# Patient Record
Sex: FEMALE | Race: WHITE | Employment: UNEMPLOYED
[De-identification: names, ages, dates, MRNs, and addresses within clinical notes are randomized per-mention and may not be internally consistent; named-entity substitution may affect disease eponyms.]

---

## 2017-01-24 ENCOUNTER — TELEPHONE (OUTPATIENT)
Dept: INTERNAL MEDICINE | Facility: CLINIC | Age: 52
End: 2017-01-24

## 2017-02-22 ENCOUNTER — TELEPHONE (OUTPATIENT)
Dept: INTERNAL MEDICINE | Facility: CLINIC | Age: 52
End: 2017-02-22

## 2017-02-22 DIAGNOSIS — I10 ESSENTIAL HYPERTENSION: Primary | ICD-10-CM

## 2017-02-22 DIAGNOSIS — E11.22 TYPE 2 DIABETES MELLITUS WITH CHRONIC KIDNEY DISEASE, WITHOUT LONG-TERM CURRENT USE OF INSULIN, UNSPECIFIED CKD STAGE (HCC): ICD-10-CM

## 2017-06-06 ENCOUNTER — HOSPITAL ENCOUNTER (OUTPATIENT)
Age: 52
Discharge: HOME | End: 2017-06-06
Payer: COMMERCIAL

## 2017-06-06 LAB
BUN BLDV-MCNC: 16 MG/DL (ref 6–20)
CREAT SERPL-MCNC: 0.69 MG/DL (ref 0.5–0.9)
GFR AFRICAN AMERICAN: >60 ML/MIN
GFR NON-AFRICAN AMERICAN: >60 ML/MIN
GFR SERPL CREATININE-BSD FRML MDRD: NORMAL ML/MIN/{1.73_M2}
GFR SERPL CREATININE-BSD FRML MDRD: NORMAL ML/MIN/{1.73_M2}

## 2017-06-06 PROCEDURE — 82565 ASSAY OF CREATININE: CPT

## 2017-06-06 PROCEDURE — 84520 ASSAY OF UREA NITROGEN: CPT

## 2017-06-06 PROCEDURE — 36415 COLL VENOUS BLD VENIPUNCTURE: CPT

## 2017-07-21 DIAGNOSIS — I10 ESSENTIAL HYPERTENSION: ICD-10-CM

## 2017-07-24 RX ORDER — METOPROLOL SUCCINATE 50 MG/1
50 TABLET, EXTENDED RELEASE ORAL DAILY
Qty: 30 TABLET | Refills: 6 | Status: SHIPPED | OUTPATIENT
Start: 2017-07-24 | End: 2018-04-08 | Stop reason: SDUPTHER

## 2017-07-24 RX ORDER — GLIMEPIRIDE 2 MG/1
2 TABLET ORAL EVERY MORNING
Qty: 30 TABLET | Refills: 6 | Status: SHIPPED | OUTPATIENT
Start: 2017-07-24 | End: 2017-12-14 | Stop reason: SDUPTHER

## 2017-12-14 ENCOUNTER — OFFICE VISIT (OUTPATIENT)
Dept: INTERNAL MEDICINE | Age: 52
End: 2017-12-14
Payer: COMMERCIAL

## 2017-12-14 VITALS
HEIGHT: 67 IN | DIASTOLIC BLOOD PRESSURE: 75 MMHG | SYSTOLIC BLOOD PRESSURE: 172 MMHG | OXYGEN SATURATION: 98 % | WEIGHT: 199 LBS | BODY MASS INDEX: 31.23 KG/M2 | HEART RATE: 77 BPM

## 2017-12-14 DIAGNOSIS — I73.9 CLAUDICATION OF LOWER EXTREMITY (HCC): ICD-10-CM

## 2017-12-14 DIAGNOSIS — Z13.220 SCREENING FOR HYPERLIPIDEMIA: ICD-10-CM

## 2017-12-14 DIAGNOSIS — Z23 NEED FOR PROPHYLACTIC VACCINATION AGAINST STREPTOCOCCUS PNEUMONIAE (PNEUMOCOCCUS) AND INFLUENZA: ICD-10-CM

## 2017-12-14 DIAGNOSIS — E11.22 TYPE 2 DIABETES MELLITUS WITH CHRONIC KIDNEY DISEASE, WITHOUT LONG-TERM CURRENT USE OF INSULIN, UNSPECIFIED CKD STAGE (HCC): Primary | ICD-10-CM

## 2017-12-14 DIAGNOSIS — Z12.11 SCREENING FOR COLON CANCER: ICD-10-CM

## 2017-12-14 DIAGNOSIS — K21.9 GASTROESOPHAGEAL REFLUX DISEASE WITHOUT ESOPHAGITIS: ICD-10-CM

## 2017-12-14 DIAGNOSIS — Z23 FLU VACCINE NEED: ICD-10-CM

## 2017-12-14 DIAGNOSIS — Z12.31 ENCOUNTER FOR SCREENING MAMMOGRAM FOR BREAST CANCER: ICD-10-CM

## 2017-12-14 DIAGNOSIS — I10 ESSENTIAL HYPERTENSION: ICD-10-CM

## 2017-12-14 LAB — HBA1C MFR BLD: 9.1 %

## 2017-12-14 PROCEDURE — 90472 IMMUNIZATION ADMIN EACH ADD: CPT | Performed by: INTERNAL MEDICINE

## 2017-12-14 PROCEDURE — 83036 HEMOGLOBIN GLYCOSYLATED A1C: CPT | Performed by: INTERNAL MEDICINE

## 2017-12-14 PROCEDURE — 90471 IMMUNIZATION ADMIN: CPT | Performed by: INTERNAL MEDICINE

## 2017-12-14 PROCEDURE — 90688 IIV4 VACCINE SPLT 0.5 ML IM: CPT | Performed by: INTERNAL MEDICINE

## 2017-12-14 PROCEDURE — 90670 PCV13 VACCINE IM: CPT | Performed by: INTERNAL MEDICINE

## 2017-12-14 RX ORDER — GLIMEPIRIDE 4 MG/1
4 TABLET ORAL EVERY MORNING
Qty: 30 TABLET | Refills: 0 | Status: SHIPPED | OUTPATIENT
Start: 2017-12-14 | End: 2018-01-19 | Stop reason: SDUPTHER

## 2017-12-14 RX ORDER — CLOPIDOGREL BISULFATE 75 MG/1
TABLET ORAL
Refills: 0 | COMMUNITY
Start: 2017-12-02 | End: 2020-05-08 | Stop reason: SDUPTHER

## 2017-12-14 RX ORDER — TRAMADOL HYDROCHLORIDE 50 MG/1
50 TABLET ORAL EVERY 8 HOURS PRN
Qty: 21 TABLET | Refills: 0 | Status: SHIPPED | OUTPATIENT
Start: 2017-12-14 | End: 2018-03-15 | Stop reason: SDUPTHER

## 2017-12-14 ASSESSMENT — ENCOUNTER SYMPTOMS
GASTROINTESTINAL NEGATIVE: 1
BACK PAIN: 1
RESPIRATORY NEGATIVE: 1
EYES NEGATIVE: 1
ALLERGIC/IMMUNOLOGIC NEGATIVE: 1
SINUS PRESSURE: 1

## 2017-12-14 ASSESSMENT — PATIENT HEALTH QUESTIONNAIRE - PHQ9
SUM OF ALL RESPONSES TO PHQ QUESTIONS 1-9: 0
1. LITTLE INTEREST OR PLEASURE IN DOING THINGS: 0
SUM OF ALL RESPONSES TO PHQ9 QUESTIONS 1 & 2: 0
2. FEELING DOWN, DEPRESSED OR HOPELESS: 0

## 2017-12-14 NOTE — PROGRESS NOTES
[facility]   Progress Note      Visit Information    Have you changed or started any medications since your last visit including any over-the-counter medicines, vitamins, or herbal medicines? no   Are you having any side effects from any of your medications? -  no  Have you stopped taking any of your medications? Is so, why? -  no    Have you seen any other physician or provider since your last visit? Yes  Have you had any other diagnostic tests since your last visit? Yes - Records Obtained  Have you been seen in the emergency room and/or had an admission to a hospital since we last saw you? Yes - Records Obtained, [facility]  Have you had your routine dental cleaning in the past 6 months? no    Have you activated your [employer] account? If not, what are your barriers?  No:      Patient Care Team:  Boris Jaffe MD as PCP - General (Internal Medicine)  Marc Summers MD as Consulting Physician (Internal Medicine Cardiovascular Disease)    Medical History Review  Past Medical, Family, and Social History reviewed and does contribute to the patient presenting condition    Health Maintenance   Topic Date Due    HIV screen  01/01/1981    Pneumococcal med risk (1 of 1 - PPSV23) 01/01/1985    Colon cancer screen colonoscopy  01/01/2016    Lipid screen  08/12/2016    Diabetic foot exam  12/02/2016    Diabetic retinal exam  12/02/2016    Diabetic hemoglobin A1C test  04/04/2017    Diabetic microalbuminuria test  08/24/2017    Flu vaccine (1) 09/01/2017    Breast cancer screen  12/02/2017    Cervical cancer screen  12/02/2018    DTaP/Tdap/Td vaccine (2 - Td) 08/13/2025         Date of patient's visit: 12/14/2017  Patient's Name:  Sharlene Espinosa                   YOB: 1965        PCP:  Boris Jaffe MD    Sharlene Espinosa is a 47 y.o. female who presents for   Chief Complaint   Patient presents with    Diabetes    and follow up of chronic (L) 08/12/2015        Has had Fem pop bypass for PAD and reports not much change to leg. She is not smoking anymore. She is on ASA, plavix, and statin therapy. Continues to have a combination of claudication and neuropathic pain,  Would like to see another pain practitioner    Take her BP meds at night but reports pain and BP is high today. Patient's allergies, medications, past medical, surgical, social and family histories were reviewed and updated as appropriate. ALLERGIES    No Known Allergies      MEDICATIONS:      Current Outpatient Prescriptions   Medication Sig Dispense Refill    metFORMIN (GLUCOPHAGE) 1000 MG tablet take 1 tablet by mouth twice a day with meals 60 tablet 6    glimepiride (AMARYL) 2 MG tablet Take 1 tablet by mouth every morning 30 tablet 6    metoprolol succinate (TOPROL XL) 50 MG extended release tablet Take 1 tablet by mouth daily 30 tablet 6    lisinopril (PRINIVIL) 20 MG tablet Take 1 tablet by mouth daily 30 tablet 3    atorvastatin (LIPITOR) 40 MG tablet Take 1 tablet by mouth daily 30 tablet 6    prasugrel (EFFIENT) 10 MG TABS Take 10 mg by mouth daily.  aspirin 81 MG tablet Take 81 mg by mouth daily. No current facility-administered medications for this visit.         Patient Care Team:  Luz Kong MD as PCP - General (Internal Medicine)  Tito Summers MD as Consulting Physician (Internal Medicine Cardiovascular Disease)    PAST MEDICAL AND SURGICAL HISTORY:      Past Medical History:   Diagnosis Date    CAD (coronary artery disease)     Diabetes mellitus ([facility] [address]     Hyperlipidemia     Hypertension     Type II or unspecified type diabetes mellitus without mention of complication, not stated as uncontrolled      Past Surgical History:   Procedure Laterality Date    CARDIAC SURGERY      Two Stents placed   [place] [id number] VASCULAR SURGERY      PVD       SOCIAL HISTORY      Social History   Substance Use Topics    Smoking status: LABORATORY FINDINGS:    CBC:   Lab Results   Component Value Date    WBC 7.6 08/24/2016    HGB 14.6 08/24/2016     08/24/2016     05/26/2012     BMP:    Lab Results   Component Value Date     08/24/2016    K 4.2 08/24/2016     08/24/2016    CO2 24 08/24/2016    BUN 16 06/06/2017    CREATININE 0.69 06/06/2017    GLUCOSE 204 08/24/2016    GLUCOSE 167 05/26/2012     Hemoglobin A1C:   Lab Results   Component Value Date    LABA1C 9.1 12/14/2017     Microalbumin Urine:   Lab Results   Component Value Date    MICROALBUR 208 08/24/2016     Lipid profile:   Lab Results   Component Value Date    CHOL 174 08/12/2015    TRIG 104 08/12/2015    HDL 30 08/12/2015     Thyroid functions:   Lab Results   Component Value Date    TSH 0.57 09/01/2011      Hepatic functions:   Lab Results   Component Value Date    ALT 9 08/12/2015    AST 6 08/12/2015    PROT 6.7 08/12/2015    BILITOT 0.33 08/12/2015    LABALBU 3.7 08/12/2015     Urine Analysis: No results found for: [address] [place]:      Health Maintenance Due   Topic Date Due    Colon cancer screen colonoscopy  01/01/2016    Lipid screen  08/12/2016    Diabetic retinal exam  12/02/2016    Diabetic microalbuminuria test  08/24/2017    Breast cancer screen  12/02/2017       ASSESSMENT AND PLAN:      1. Type 2 diabetes mellitus with chronic kidney disease, without long-term current use of insulin, unspecified CKD stage (HCC)   DIABETES FOOT EXAM- unchanged. We'll increase Amaryl from 2 mg to 4 mg daily and continue metformin 1000 mg twice a day     Microalbumin, Ur    POCT glycosylated hemoglobin (Hb A1C)    glimepiride (AMARYL) 4 MG tablet   2. Essential hypertension  , Uncontrolled today. Would continue metoprolol 50 mg daily and lisinopril 20 mg daily. Have patient see pain management and have pain controlled and then recheck blood pressure    3. Gastroesophageal reflux disease without esophagitis  , Stable on a PPI    4.  Encounter for

## 2018-01-19 DIAGNOSIS — E11.22 TYPE 2 DIABETES MELLITUS WITH CHRONIC KIDNEY DISEASE, WITHOUT LONG-TERM CURRENT USE OF INSULIN, UNSPECIFIED CKD STAGE (HCC): ICD-10-CM

## 2018-01-22 RX ORDER — GLIMEPIRIDE 4 MG/1
TABLET ORAL
Qty: 30 TABLET | Refills: 0 | Status: SHIPPED | OUTPATIENT
Start: 2018-01-22 | End: 2018-03-02 | Stop reason: SDUPTHER

## 2018-01-22 NOTE — TELEPHONE ENCOUNTER
Health Maintenance   Topic Date Due    Colon cancer screen colonoscopy  01/01/2016    Lipid screen  08/12/2016    Diabetic retinal exam  12/02/2016    Diabetic microalbuminuria test  08/24/2017    Breast cancer screen  12/02/2017    HIV screen  03/14/2018 (Originally 1/1/1981)    A1C test (Diabetic or Prediabetic)  03/14/2018    Cervical cancer screen  12/02/2018    Diabetic foot exam  12/14/2018    DTaP/Tdap/Td vaccine (2 - Td) 08/13/2025    Flu vaccine  Completed    Pneumococcal med risk  Completed             (applicable per patient's age: Cancer Screenings, Depression Screening, Fall Risk Screening, Immunizations)    Hemoglobin A1C (%)   Date Value   12/14/2017 9.1   04/04/2016 9.3   08/12/2015 10.5 (H)     Microalb/Crt.  Ratio (mcg/mg creat)   Date Value   08/24/2016 822 (H)     LDL Cholesterol (mg/dL)   Date Value   08/12/2015 123     AST (U/L)   Date Value   08/12/2015 6     ALT (U/L)   Date Value   08/12/2015 9     BUN (mg/dL)   Date Value   06/06/2017 16      (goal A1C is < 7)   (goal LDL is <100) need 30-50% reduction from baseline     BP Readings from Last 3 Encounters:   12/14/17 (!) 172/75   11/07/16 (!) 169/79   09/26/16 153/65    (goal /80)      All Future Testing planned in [facility]:  Lab Frequency Next Occurrence   Hemoglobin A1C Once 01/16/2018   Lipid Panel Once 01/16/2018   DENT[place] Digital Screen Bilateral [[id number]] Once 01/13/2018   POCT FECAL IMMUNOCHEMICAL TEST (FIT) Once 06/28/2018   Microalbumin, Ur Once 01/13/2018   Lipid, Fasting Once 01/13/2018       Next Visit Date:  Future Appointments  Date Time Provider Leonel Stephens   3/15/2018 11:10 AM Kings De Santiago MD [facility]            Patient Active Problem List:     DM type 2 (diabetes mellitus, type 2) ([facility] [address]     HTN (hypertension)     GERD (gastroesophageal reflux disease)     Tobacco abuse     Hyperlipidemia     Claudication of lower extremity ([facility] [address]     Diabetic peripheral vascular disease ([facility] [address]     Panic attack     Leg

## 2018-03-02 DIAGNOSIS — E11.22 TYPE 2 DIABETES MELLITUS WITH CHRONIC KIDNEY DISEASE, WITHOUT LONG-TERM CURRENT USE OF INSULIN, UNSPECIFIED CKD STAGE (HCC): ICD-10-CM

## 2018-03-06 RX ORDER — GLIMEPIRIDE 4 MG/1
TABLET ORAL
Qty: 30 TABLET | Refills: 3 | Status: SHIPPED | OUTPATIENT
Start: 2018-03-06 | End: 2018-08-01 | Stop reason: SDUPTHER

## 2018-03-15 ENCOUNTER — HOSPITAL ENCOUNTER (OUTPATIENT)
Age: 53
Setting detail: SPECIMEN
Discharge: HOME | End: 2018-03-15
Payer: COMMERCIAL

## 2018-03-15 ENCOUNTER — OFFICE VISIT (OUTPATIENT)
Dept: INTERNAL MEDICINE | Age: 53
End: 2018-03-15
Payer: COMMERCIAL

## 2018-03-15 VITALS
WEIGHT: 204 LBS | HEART RATE: 73 BPM | OXYGEN SATURATION: 98 % | DIASTOLIC BLOOD PRESSURE: 76 MMHG | BODY MASS INDEX: 31.95 KG/M2 | SYSTOLIC BLOOD PRESSURE: 178 MMHG

## 2018-03-15 DIAGNOSIS — Z13.220 SCREENING FOR HYPERLIPIDEMIA: ICD-10-CM

## 2018-03-15 DIAGNOSIS — I73.9 CLAUDICATION OF LOWER EXTREMITY (HCC): ICD-10-CM

## 2018-03-15 DIAGNOSIS — Z23 NEED FOR PROPHYLACTIC VACCINATION AND INOCULATION AGAINST VARICELLA: ICD-10-CM

## 2018-03-15 DIAGNOSIS — I10 ESSENTIAL HYPERTENSION: ICD-10-CM

## 2018-03-15 DIAGNOSIS — E11.42 DIABETIC POLYNEUROPATHY ASSOCIATED WITH TYPE 2 DIABETES MELLITUS (HCC): Primary | ICD-10-CM

## 2018-03-15 DIAGNOSIS — Z12.31 ENCOUNTER FOR SCREENING MAMMOGRAM FOR BREAST CANCER: ICD-10-CM

## 2018-03-15 DIAGNOSIS — E11.42 DIABETIC POLYNEUROPATHY ASSOCIATED WITH TYPE 2 DIABETES MELLITUS (HCC): ICD-10-CM

## 2018-03-15 LAB
CREATININE URINE: 75 MG/DL (ref 28–217)
HBA1C MFR BLD: 10.6 %
MICROALBUMIN/CREAT 24H UR: 653 MG/L
MICROALBUMIN/CREAT UR-RTO: 871 MCG/MG CREAT

## 2018-03-15 PROCEDURE — 83036 HEMOGLOBIN GLYCOSYLATED A1C: CPT | Performed by: INTERNAL MEDICINE

## 2018-03-15 PROCEDURE — 99214 OFFICE O/P EST MOD 30 MIN: CPT | Performed by: INTERNAL MEDICINE

## 2018-03-15 RX ORDER — TRAMADOL HYDROCHLORIDE 50 MG/1
50 TABLET ORAL DAILY PRN
Qty: 30 TABLET | Refills: 0 | Status: SHIPPED | OUTPATIENT
Start: 2018-03-15 | End: 2018-04-10 | Stop reason: SDUPTHER

## 2018-03-15 RX ORDER — AMLODIPINE BESYLATE 5 MG/1
5 TABLET ORAL DAILY
Qty: 30 TABLET | Refills: 3 | Status: SHIPPED | OUTPATIENT
Start: 2018-03-15 | End: 2018-11-05 | Stop reason: SDUPTHER

## 2018-03-15 RX ORDER — LISINOPRIL 20 MG/1
20 TABLET ORAL DAILY
Qty: 30 TABLET | Refills: 3 | Status: SHIPPED | OUTPATIENT
Start: 2018-03-15 | End: 2019-03-11 | Stop reason: ALTCHOICE

## 2018-03-15 ASSESSMENT — ENCOUNTER SYMPTOMS
SHORTNESS OF BREATH: 0
NAUSEA: 0
RHINORRHEA: 0
DIARRHEA: 0
SINUS PAIN: 0
CONSTIPATION: 0
COUGH: 0
BACK PAIN: 1

## 2018-04-08 DIAGNOSIS — I10 ESSENTIAL HYPERTENSION: ICD-10-CM

## 2018-04-09 RX ORDER — METOPROLOL SUCCINATE 50 MG/1
TABLET, EXTENDED RELEASE ORAL
Qty: 30 TABLET | Refills: 6 | Status: SHIPPED | OUTPATIENT
Start: 2018-04-09 | End: 2019-03-11 | Stop reason: SDUPTHER

## 2018-06-18 ENCOUNTER — HOSPITAL ENCOUNTER (OUTPATIENT)
Age: 53
Discharge: HOME | End: 2018-06-18
Payer: COMMERCIAL

## 2018-06-18 ENCOUNTER — OFFICE VISIT (OUTPATIENT)
Dept: INTERNAL MEDICINE | Age: 53
End: 2018-06-18
Payer: COMMERCIAL

## 2018-06-18 VITALS
HEART RATE: 80 BPM | BODY MASS INDEX: 32.15 KG/M2 | OXYGEN SATURATION: 100 % | RESPIRATION RATE: 12 BRPM | SYSTOLIC BLOOD PRESSURE: 138 MMHG | WEIGHT: 204.8 LBS | DIASTOLIC BLOOD PRESSURE: 78 MMHG | HEIGHT: 67 IN

## 2018-06-18 DIAGNOSIS — Z12.11 SCREENING FOR COLON CANCER: ICD-10-CM

## 2018-06-18 DIAGNOSIS — E11.22 TYPE 2 DIABETES MELLITUS WITH CHRONIC KIDNEY DISEASE, WITHOUT LONG-TERM CURRENT USE OF INSULIN, UNSPECIFIED CKD STAGE (HCC): Primary | ICD-10-CM

## 2018-06-18 DIAGNOSIS — Z23 NEED FOR PROPHYLACTIC VACCINATION AND INOCULATION AGAINST VARICELLA: ICD-10-CM

## 2018-06-18 DIAGNOSIS — I73.9 CLAUDICATION OF LOWER EXTREMITY (HCC): ICD-10-CM

## 2018-06-18 DIAGNOSIS — Z12.31 ENCOUNTER FOR SCREENING MAMMOGRAM FOR BREAST CANCER: ICD-10-CM

## 2018-06-18 DIAGNOSIS — Z13.220 SCREENING FOR HYPERLIPIDEMIA: ICD-10-CM

## 2018-06-18 DIAGNOSIS — I10 ESSENTIAL HYPERTENSION: ICD-10-CM

## 2018-06-18 LAB
CHOLESTEROL, FASTING: 251 MG/DL
CHOLESTEROL/HDL RATIO: 7.8
HBA1C MFR BLD: 10.6 %
HDLC SERPL-MCNC: 32 MG/DL
LDL CHOLESTEROL DIRECT: 166 MG/DL
LDL CHOLESTEROL: ABNORMAL MG/DL (ref 0–130)
TRIGLYCERIDE, FASTING: 473 MG/DL
VLDLC SERPL CALC-MCNC: ABNORMAL MG/DL (ref 1–30)

## 2018-06-18 PROCEDURE — 99214 OFFICE O/P EST MOD 30 MIN: CPT | Performed by: INTERNAL MEDICINE

## 2018-06-18 PROCEDURE — 83036 HEMOGLOBIN GLYCOSYLATED A1C: CPT | Performed by: INTERNAL MEDICINE

## 2018-06-18 PROCEDURE — 83721 ASSAY OF BLOOD LIPOPROTEIN: CPT

## 2018-06-18 PROCEDURE — 80061 LIPID PANEL: CPT

## 2018-06-18 PROCEDURE — 36415 COLL VENOUS BLD VENIPUNCTURE: CPT

## 2018-06-18 RX ORDER — TRAMADOL HYDROCHLORIDE 50 MG/1
50 TABLET ORAL DAILY PRN
Qty: 30 TABLET | Refills: 0 | Status: SHIPPED | OUTPATIENT
Start: 2018-06-18 | End: 2018-07-18

## 2018-06-18 ASSESSMENT — ENCOUNTER SYMPTOMS
RESPIRATORY NEGATIVE: 1
ALLERGIC/IMMUNOLOGIC NEGATIVE: 1
GASTROINTESTINAL NEGATIVE: 1
EYES NEGATIVE: 1
BACK PAIN: 1

## 2018-07-13 DIAGNOSIS — I73.9 CLAUDICATION OF LOWER EXTREMITY (HCC): ICD-10-CM

## 2018-07-16 RX ORDER — TRAMADOL HYDROCHLORIDE 50 MG/1
50 TABLET ORAL DAILY PRN
Qty: 30 TABLET | Refills: 0 | OUTPATIENT
Start: 2018-07-16 | End: 2018-08-15

## 2018-07-23 ENCOUNTER — OFFICE VISIT (OUTPATIENT)
Dept: INTERNAL MEDICINE | Age: 53
End: 2018-07-23
Payer: COMMERCIAL

## 2018-07-23 VITALS
DIASTOLIC BLOOD PRESSURE: 75 MMHG | HEIGHT: 67 IN | OXYGEN SATURATION: 97 % | HEART RATE: 78 BPM | SYSTOLIC BLOOD PRESSURE: 176 MMHG | WEIGHT: 208 LBS | BODY MASS INDEX: 32.65 KG/M2

## 2018-07-23 DIAGNOSIS — M79.606 LOWER EXTREMITY PAIN, DIFFUSE, UNSPECIFIED LATERALITY: Primary | ICD-10-CM

## 2018-07-23 DIAGNOSIS — E11.22 TYPE 2 DIABETES MELLITUS WITH CHRONIC KIDNEY DISEASE, WITHOUT LONG-TERM CURRENT USE OF INSULIN, UNSPECIFIED CKD STAGE (HCC): ICD-10-CM

## 2018-07-23 PROCEDURE — 99212 OFFICE O/P EST SF 10 MIN: CPT | Performed by: INTERNAL MEDICINE

## 2018-07-23 RX ORDER — TRAMADOL HYDROCHLORIDE 50 MG/1
50 TABLET ORAL DAILY PRN
Qty: 30 TABLET | Refills: 0 | Status: SHIPPED | OUTPATIENT
Start: 2018-07-23 | End: 2018-09-07 | Stop reason: SDUPTHER

## 2018-07-23 RX ORDER — GABAPENTIN 300 MG/1
300 CAPSULE ORAL 3 TIMES DAILY
Qty: 90 CAPSULE | Refills: 3 | Status: SHIPPED | OUTPATIENT
Start: 2018-07-23 | End: 2019-08-07 | Stop reason: SDUPTHER

## 2018-07-23 RX ORDER — TRAMADOL HYDROCHLORIDE 50 MG/1
50 TABLET ORAL DAILY PRN
COMMUNITY
End: 2018-07-23 | Stop reason: SDUPTHER

## 2018-07-23 NOTE — PROGRESS NOTES
Controlled substances monitoring: [place]AR[place] report reviewed today- activity consistent with treatment plan.  Will add gabapentin to help with neuropathic pain

## 2018-08-01 DIAGNOSIS — E11.22 TYPE 2 DIABETES MELLITUS WITH CHRONIC KIDNEY DISEASE, WITHOUT LONG-TERM CURRENT USE OF INSULIN, UNSPECIFIED CKD STAGE (HCC): ICD-10-CM

## 2018-08-02 RX ORDER — GLIMEPIRIDE 4 MG/1
TABLET ORAL
Qty: 30 TABLET | Refills: 3 | Status: SHIPPED | OUTPATIENT
Start: 2018-08-02 | End: 2019-03-11 | Stop reason: ALTCHOICE

## 2018-08-02 NOTE — TELEPHONE ENCOUNTER
Health Maintenance   Topic Date Due    Shingles Vaccine (1 of 2 - 2 Dose Series) 01/01/2016    Colon cancer screen colonoscopy  01/01/2016    Breast cancer screen  12/02/2017    HIV screen  09/18/2018 (Originally 1/1/1981)    Diabetic retinal exam  06/15/2019 (Originally 12/2/2016)    Potassium monitoring  06/15/2019 (Originally 8/24/2017)    Creatinine monitoring  06/15/2019 (Originally 6/6/2018)    Flu vaccine (1) 09/01/2018    A1C test (Diabetic or Prediabetic)  09/18/2018    Cervical cancer screen  12/02/2018    Diabetic foot exam  12/14/2018    Diabetic microalbuminuria test  03/15/2019    Lipid screen  06/18/2019    DTaP/Tdap/Td vaccine (2 - Td) 08/13/2025    Pneumococcal med risk  Completed             (applicable per patient's age: Cancer Screenings, Depression Screening, Fall Risk Screening, Immunizations)    Hemoglobin A1C (%)   Date Value   06/18/2018 10.6   03/15/2018 10.6   12/14/2017 9.1     Microalb/Crt.  Ratio (mcg/mg creat)   Date Value   03/15/2018 871 (H)     LDL Cholesterol (mg/dL)   Date Value   06/18/2018          AST (U/L)   Date Value   08/12/2015 6     ALT (U/L)   Date Value   08/12/2015 9     BUN (mg/dL)   Date Value   06/06/2017 16      (goal A1C is < 7)   (goal LDL is <100) need 30-50% reduction from baseline     BP Readings from Last 3 Encounters:   07/23/18 (!) 176/75   06/18/18 138/78   03/15/18 (!) 178/76    (goal /80)      All Future Testing planned in [facility]:  Lab Frequency Next Occurrence   POCT FECAL IMMUNOCHEMICAL TEST (FIT) Once 11/14/2018   [place] Digital Screen Bilateral [[id number]] Once 07/18/2018       Next Visit Date:  Future Appointments  Date Time Provider Leonel Stephens   8/27/2018 1:40 PM Shawna Pearl MD [facility]            Patient Active Problem List:     DM type 2 (diabetes mellitus, type 2) ([facility] [address]     HTN (hypertension)     GERD (gastroesophageal reflux disease)     Hyperlipidemia     Claudication of lower extremity ([facility] [address]     Diabetic

## 2018-08-24 PROBLEM — G62.9 NEUROPATHY: Status: ACTIVE | Noted: 2018-01-17

## 2018-09-07 ENCOUNTER — TELEPHONE (OUTPATIENT)
Dept: INTERNAL MEDICINE | Age: 53
End: 2018-09-07

## 2018-09-07 DIAGNOSIS — M79.606 LOWER EXTREMITY PAIN, DIFFUSE, UNSPECIFIED LATERALITY: ICD-10-CM

## 2018-09-07 RX ORDER — TRAMADOL HYDROCHLORIDE 50 MG/1
50 TABLET ORAL DAILY PRN
Qty: 30 TABLET | Refills: 0 | Status: SHIPPED | OUTPATIENT
Start: 2018-09-07 | End: 2018-10-09 | Stop reason: SDUPTHER

## 2018-10-08 ENCOUNTER — TELEPHONE (OUTPATIENT)
Dept: INTERNAL MEDICINE | Age: 53
End: 2018-10-08

## 2018-10-08 DIAGNOSIS — M79.606 LOWER EXTREMITY PAIN, DIFFUSE, UNSPECIFIED LATERALITY: ICD-10-CM

## 2018-10-08 NOTE — TELEPHONE ENCOUNTER
Pt calling asking for tramadol refill , she cannot get in to see dr Anahi Perez  til november 5th there is no open appt / please advise       Next Visit Date:  Future Appointments  Date Time Provider Leonel Stephens   11/5/2018 11:30 AM Juventino Cool MD [facility]   Topic Date Due    HIV screen  01/01/1981    Shingles Vaccine (1 of 2 - 2 Dose Series) 01/01/2016    Colon cancer screen colonoscopy  01/01/2016    Breast cancer screen  12/02/2017    Flu vaccine (1) 09/01/2018    A1C test (Diabetic or Prediabetic)  09/18/2018    Diabetic retinal exam  06/15/2019 (Originally 12/2/2016)    Potassium monitoring  06/15/2019 (Originally 8/24/2017)    Creatinine monitoring  06/15/2019 (Originally 6/6/2018)    Cervical cancer screen  12/02/2018    Diabetic foot exam  12/14/2018    Diabetic microalbuminuria test  03/15/2019    Lipid screen  06/18/2019    DTaP/Tdap/Td vaccine (2 - Td) 08/13/2025    Pneumococcal med risk  Completed       Hemoglobin A1C (%)   Date Value   06/18/2018 10.6   03/15/2018 10.6   12/14/2017 9.1             ( goal A1C is < 7)   Microalb/Crt.  Ratio (mcg/mg creat)   Date Value   03/15/2018 871 (H)     LDL Cholesterol (mg/dL)   Date Value   06/18/2018 08/12/2015 123       (goal LDL is <100)   AST (U/L)   Date Value   08/12/2015 6     ALT (U/L)   Date Value   08/12/2015 9     BUN (mg/dL)   Date Value   06/06/2017 16     BP Readings from Last 3 Encounters:   07/23/18 (!) 176/75   06/18/18 138/78   03/15/18 (!) 178/76          (goal 120/80)    All Future Testing planned in [facility]  Lab Frequency Next Occurrence   POCT FECAL IMMUNOCHEMICAL TEST (FIT) Once 11/14/2018   DENTON Digital Screen Bilateral [[id number]] Once 07/18/2018               Patient Active Problem List:     DM type 2 (diabetes mellitus, type 2) ([address]     HTN (hypertension)     GERD (gastroesophageal reflux disease)     Hyperlipidemia     Claudication of lower extremity (HCC)     Diabetes mellitus

## 2018-10-09 RX ORDER — TRAMADOL HYDROCHLORIDE 50 MG/1
50 TABLET ORAL DAILY PRN
Qty: 30 TABLET | Refills: 0 | Status: SHIPPED | OUTPATIENT
Start: 2018-10-09 | End: 2018-11-05 | Stop reason: SDUPTHER

## 2018-11-05 ENCOUNTER — OFFICE VISIT (OUTPATIENT)
Dept: INTERNAL MEDICINE | Age: 53
End: 2018-11-05
Payer: COMMERCIAL

## 2018-11-05 VITALS
HEART RATE: 83 BPM | DIASTOLIC BLOOD PRESSURE: 84 MMHG | HEIGHT: 67 IN | WEIGHT: 205 LBS | SYSTOLIC BLOOD PRESSURE: 181 MMHG | BODY MASS INDEX: 32.18 KG/M2

## 2018-11-05 DIAGNOSIS — Z23 NEED FOR PROPHYLACTIC VACCINATION AND INOCULATION AGAINST VARICELLA: ICD-10-CM

## 2018-11-05 DIAGNOSIS — E78.5 HYPERLIPIDEMIA, UNSPECIFIED HYPERLIPIDEMIA TYPE: ICD-10-CM

## 2018-11-05 DIAGNOSIS — E11.22 TYPE 2 DIABETES MELLITUS WITH CHRONIC KIDNEY DISEASE, WITHOUT LONG-TERM CURRENT USE OF INSULIN, UNSPECIFIED CKD STAGE (HCC): Primary | ICD-10-CM

## 2018-11-05 DIAGNOSIS — M79.606 LOWER EXTREMITY PAIN, DIFFUSE, UNSPECIFIED LATERALITY: ICD-10-CM

## 2018-11-05 DIAGNOSIS — I10 ESSENTIAL HYPERTENSION: ICD-10-CM

## 2018-11-05 DIAGNOSIS — Z12.31 ENCOUNTER FOR SCREENING MAMMOGRAM FOR BREAST CANCER: ICD-10-CM

## 2018-11-05 DIAGNOSIS — K21.9 GASTROESOPHAGEAL REFLUX DISEASE WITHOUT ESOPHAGITIS: ICD-10-CM

## 2018-11-05 DIAGNOSIS — Z23 NEEDS FLU SHOT: ICD-10-CM

## 2018-11-05 DIAGNOSIS — Z12.11 SCREENING FOR COLON CANCER: ICD-10-CM

## 2018-11-05 LAB — HBA1C MFR BLD: 11.5 %

## 2018-11-05 PROCEDURE — 99214 OFFICE O/P EST MOD 30 MIN: CPT | Performed by: INTERNAL MEDICINE

## 2018-11-05 PROCEDURE — 83036 HEMOGLOBIN GLYCOSYLATED A1C: CPT | Performed by: INTERNAL MEDICINE

## 2018-11-05 PROCEDURE — 99211 OFF/OP EST MAY X REQ PHY/QHP: CPT | Performed by: INTERNAL MEDICINE

## 2018-11-05 PROCEDURE — 90686 IIV4 VACC NO PRSV 0.5 ML IM: CPT | Performed by: INTERNAL MEDICINE

## 2018-11-05 RX ORDER — TRAMADOL HYDROCHLORIDE 50 MG/1
50 TABLET ORAL DAILY PRN
Qty: 30 TABLET | Refills: 0 | Status: SHIPPED | OUTPATIENT
Start: 2018-11-05 | End: 2018-12-06 | Stop reason: SDUPTHER

## 2018-11-05 RX ORDER — AMLODIPINE BESYLATE 10 MG/1
10 TABLET ORAL DAILY
Qty: 30 TABLET | Refills: 5 | Status: SHIPPED | OUTPATIENT
Start: 2018-11-05 | End: 2019-03-11

## 2018-11-05 ASSESSMENT — ENCOUNTER SYMPTOMS
EYES NEGATIVE: 1
RESPIRATORY NEGATIVE: 1
GASTROINTESTINAL NEGATIVE: 1
BACK PAIN: 1
ALLERGIC/IMMUNOLOGIC NEGATIVE: 1

## 2018-11-05 ASSESSMENT — PATIENT HEALTH QUESTIONNAIRE - PHQ9
SUM OF ALL RESPONSES TO PHQ QUESTIONS 1-9: 0
2. FEELING DOWN, DEPRESSED OR HOPELESS: 0
SUM OF ALL RESPONSES TO PHQ QUESTIONS 1-9: 0
1. LITTLE INTEREST OR PLEASURE IN DOING THINGS: 0
SUM OF ALL RESPONSES TO PHQ9 QUESTIONS 1 & 2: 0

## 2018-11-05 NOTE — PROGRESS NOTES
15.00 years. She has quit using smokeless tobacco.   Daily Aspirin? Yes    Hypertension:  Home blood pressure monitoring: No.  She is adherent to a low sodium diet. Patient denies shortness of breath and headache. Antihypertensive medication side effects: no medication side effects noted. Use of agents associated with hypertension: none. Hyperlipidemia:  No new myalgias or GI upset on atorvastatin (Lipitor). Lab Results   Component Value Date    LABA1C 11.5 11/05/2018    LABA1C 10.6 06/18/2018    LABA1C 10.6 03/15/2018     Lab Results   Component Value Date    LABMICR 871 (H) 03/15/2018    CREATININE 0.69 06/06/2017     Lab Results   Component Value Date    ALT 9 08/12/2015    AST 6 08/12/2015     Lab Results   Component Value Date    CHOL 174 08/12/2015    TRIG 104 08/12/2015    HDL 32 (L) 06/18/2018    LDLDIRECT 166 (H) 06/18/2018        Had followed up with vascular and reports everything has healed up well from stents. Continues to have burning pain. Patient's allergies, medications, past medical, surgical, social and family histories were reviewed and updated as appropriate. ALLERGIES    No Known Allergies      MEDICATIONS:      Current Outpatient Prescriptions   Medication Sig Dispense Refill    traMADol (ULTRAM) 50 MG tablet Take 1 tablet by mouth daily as needed for Pain for up to 30 days. . 30 tablet 0    glimepiride (AMARYL) 4 MG tablet take 1 tablet every morning 30 tablet 3    sitaGLIPtan-metformin (JANUMET)  MG per tablet Take 1 tablet by mouth 2 times daily (with meals) 60 tablet 3    gabapentin (NEURONTIN) 300 MG capsule Take 1 capsule by mouth 3 times daily for 31 days. . 90 capsule 3    metoprolol succinate (TOPROL XL) 50 MG extended release tablet take 1 tablet by mouth once daily 30 tablet 6    amLODIPine (NORVASC) 5 MG tablet Take 1 tablet by mouth daily 30 tablet 3    lisinopril (PRINIVIL) 20 MG tablet Take 1 tablet by mouth daily 30 tablet 3    clopidogrel prescribed medications. Barriers to medication compliance addressed. All patient questions answered. Pt voiced understanding. 4.  Continue current medications, diet and exercise. Orders Placed This Encounter   Medications    amLODIPine (NORVASC) 10 MG tablet     Sig: Take 1 tablet by mouth daily     Dispense:  30 tablet     Refill:  5    insulin glargine (LANTUS SOLOSTAR) 100 UNIT/ML injection pen     Sig: Inject 10 Units into the skin nightly     Dispense:  5 pen     Refill:  3    traMADol (ULTRAM) 50 MG tablet     Sig: Take 1 tablet by mouth daily as needed for Pain for up to 30 days. .     Dispense:  30 tablet     Refill:  0          Completed Refills               Requested Prescriptions     Signed Prescriptions Disp Refills    amLODIPine (NORVASC) 10 MG tablet 30 tablet 5     Sig: Take 1 tablet by mouth daily    insulin glargine (LANTUS SOLOSTAR) 100 UNIT/ML injection pen 5 pen 3     Sig: Inject 10 Units into the skin nightly    traMADol (ULTRAM) 50 MG tablet 30 tablet 0     Sig: Take 1 tablet by mouth daily as needed for Pain for up to 30 days. .       5. Patient given educational materials - see patient instructions    6. Was a self-tracking handout given in paper form or via "[employer]"t? Yes  If yes, see orders or list here. Orders Placed This Encounter   Procedures    DENTON Digital Screen Bilateral [[id number]]     Standing Status:   Future     Standing Expiration Date:   11/5/2019    INFLUENZA, QUADV, 3 YRS AND OLDER, IM, PF, PREFILL SYR OR SDV, 0.5ML (FLUZONE QUADV, PF)    Lipid Panel     Standing Status:   Future     Standing Expiration Date:   11/5/2019     Order Specific Question:   Is Patient Fasting?/# of Hours     Answer:   12 hours    Comprehensive Metabolic Panel     Please get this labwork done before your next visit.      Standing Status:   Future     Standing Expiration Date:   11/4/2019    POCT FECAL IMMUNOCHEMICAL TEST (FIT)     Standing Status:   Future     Standing

## 2018-11-26 ENCOUNTER — TELEPHONE (OUTPATIENT)
Dept: INTERNAL MEDICINE | Age: 53
End: 2018-11-26

## 2018-12-06 DIAGNOSIS — M79.606 LOWER EXTREMITY PAIN, DIFFUSE, UNSPECIFIED LATERALITY: ICD-10-CM

## 2018-12-06 RX ORDER — TRAMADOL HYDROCHLORIDE 50 MG/1
50 TABLET ORAL DAILY PRN
Qty: 30 TABLET | Refills: 0 | Status: SHIPPED | OUTPATIENT
Start: 2018-12-06 | End: 2019-01-05

## 2019-01-07 DIAGNOSIS — M79.606 LOWER EXTREMITY PAIN, DIFFUSE, UNSPECIFIED LATERALITY: ICD-10-CM

## 2019-01-08 RX ORDER — TRAMADOL HYDROCHLORIDE 50 MG/1
50 TABLET ORAL DAILY PRN
Qty: 30 TABLET | Refills: 0 | Status: SHIPPED | OUTPATIENT
Start: 2019-01-08 | End: 2019-02-05 | Stop reason: SDUPTHER

## 2019-02-05 DIAGNOSIS — M79.606 LOWER EXTREMITY PAIN, DIFFUSE, UNSPECIFIED LATERALITY: ICD-10-CM

## 2019-02-05 RX ORDER — TRAMADOL HYDROCHLORIDE 50 MG/1
50 TABLET ORAL DAILY PRN
Qty: 30 TABLET | Refills: 0 | Status: SHIPPED | OUTPATIENT
Start: 2019-02-05 | End: 2019-03-05 | Stop reason: SDUPTHER

## 2019-03-05 DIAGNOSIS — M79.606 LOWER EXTREMITY PAIN, DIFFUSE, UNSPECIFIED LATERALITY: ICD-10-CM

## 2019-03-05 RX ORDER — TRAMADOL HYDROCHLORIDE 50 MG/1
50 TABLET ORAL DAILY PRN
Qty: 30 TABLET | Refills: 0 | Status: SHIPPED | OUTPATIENT
Start: 2019-03-05 | End: 2019-04-03 | Stop reason: SDUPTHER

## 2019-03-11 ENCOUNTER — OFFICE VISIT (OUTPATIENT)
Dept: INTERNAL MEDICINE | Age: 54
End: 2019-03-11
Payer: COMMERCIAL

## 2019-03-11 VITALS
DIASTOLIC BLOOD PRESSURE: 70 MMHG | SYSTOLIC BLOOD PRESSURE: 148 MMHG | HEIGHT: 67 IN | WEIGHT: 201.4 LBS | HEART RATE: 86 BPM | BODY MASS INDEX: 31.61 KG/M2

## 2019-03-11 DIAGNOSIS — Z11.4 ENCOUNTER FOR SCREENING FOR HIV: ICD-10-CM

## 2019-03-11 DIAGNOSIS — Z12.11 COLON CANCER SCREENING: ICD-10-CM

## 2019-03-11 DIAGNOSIS — E11.9 TYPE 2 DIABETES MELLITUS WITHOUT COMPLICATION, WITHOUT LONG-TERM CURRENT USE OF INSULIN (HCC): Primary | ICD-10-CM

## 2019-03-11 DIAGNOSIS — I10 ESSENTIAL HYPERTENSION: ICD-10-CM

## 2019-03-11 LAB — HBA1C MFR BLD: 12.9 %

## 2019-03-11 PROCEDURE — 99214 OFFICE O/P EST MOD 30 MIN: CPT | Performed by: INTERNAL MEDICINE

## 2019-03-11 PROCEDURE — 83036 HEMOGLOBIN GLYCOSYLATED A1C: CPT | Performed by: INTERNAL MEDICINE

## 2019-03-11 PROCEDURE — 99211 OFF/OP EST MAY X REQ PHY/QHP: CPT | Performed by: INTERNAL MEDICINE

## 2019-03-11 RX ORDER — LISINOPRIL AND HYDROCHLOROTHIAZIDE 20; 12.5 MG/1; MG/1
2 TABLET ORAL DAILY
Qty: 90 TABLET | Refills: 1 | Status: SHIPPED | OUTPATIENT
Start: 2019-03-11 | End: 2019-07-30

## 2019-03-11 RX ORDER — METOPROLOL SUCCINATE 50 MG/1
TABLET, EXTENDED RELEASE ORAL
Qty: 30 TABLET | Refills: 6 | Status: ON HOLD | OUTPATIENT
Start: 2019-03-11 | End: 2019-12-03 | Stop reason: SDUPTHER

## 2019-03-11 ASSESSMENT — ENCOUNTER SYMPTOMS
EYES NEGATIVE: 1
GASTROINTESTINAL NEGATIVE: 1
SORE THROAT: 1
SINUS PAIN: 1
ALLERGIC/IMMUNOLOGIC NEGATIVE: 1
RESPIRATORY NEGATIVE: 1

## 2019-03-11 ASSESSMENT — PATIENT HEALTH QUESTIONNAIRE - PHQ9
2. FEELING DOWN, DEPRESSED OR HOPELESS: 0
SUM OF ALL RESPONSES TO PHQ9 QUESTIONS 1 & 2: 0
SUM OF ALL RESPONSES TO PHQ QUESTIONS 1-9: 0
SUM OF ALL RESPONSES TO PHQ QUESTIONS 1-9: 0
1. LITTLE INTEREST OR PLEASURE IN DOING THINGS: 0

## 2019-03-12 ENCOUNTER — TELEPHONE (OUTPATIENT)
Dept: INTERNAL MEDICINE | Age: 54
End: 2019-03-12

## 2019-03-12 RX ORDER — PEN NEEDLE, DIABETIC 31 G X1/4"
1 NEEDLE, DISPOSABLE MISCELLANEOUS DAILY
Qty: 100 EACH | Refills: 3 | Status: ON HOLD | OUTPATIENT
Start: 2019-03-12 | End: 2019-12-07 | Stop reason: HOSPADM

## 2019-03-28 ENCOUNTER — TELEPHONE (OUTPATIENT)
Dept: INTERNAL MEDICINE | Age: 54
End: 2019-03-28

## 2019-03-28 NOTE — LETTER
[address]  Phone: [phone]  Fax: [phone]    Mata Malone MD        April 22, 2019    Valerie Fregoso  [address]      Dear Rosa Thompson:    During your last appointment with us on 3/11/19 with Dr. Dylon Ann, you were given the OC-Light hemoccult test kit to provide a sample of your stool in lieu of scheduling a colonoscopy. The kit has not yet been returned to us. Please follow the instructions on the envelope provided to you with the kit and return it to the clinic either by mail or in person at your earliest convenience. We are on the 2nd floor. Keep in mind that once a sample has been collected, it is good for up to 15 days in room temperature, or 30 days refrigerated. If you have any questions or concerns, please don't hesitate to call.     Sincerely,        Mata Malone MD

## 2019-04-02 DIAGNOSIS — M79.606 LOWER EXTREMITY PAIN, DIFFUSE, UNSPECIFIED LATERALITY: ICD-10-CM

## 2019-04-03 RX ORDER — TRAMADOL HYDROCHLORIDE 50 MG/1
50 TABLET ORAL DAILY PRN
Qty: 30 TABLET | Refills: 0 | Status: SHIPPED | OUTPATIENT
Start: 2019-04-03 | End: 2019-05-02 | Stop reason: SDUPTHER

## 2019-04-22 NOTE — TELEPHONE ENCOUNTER
Pt given OC-Light Hemoccult test kit 3/11/19; it has not been returned within  4 weeks. PC to pt; LM; letter sent to pt.

## 2019-05-01 DIAGNOSIS — M79.606 LOWER EXTREMITY PAIN, DIFFUSE, UNSPECIFIED LATERALITY: ICD-10-CM

## 2019-05-01 NOTE — TELEPHONE ENCOUNTER
Patient request for Tramadol . Please escribe if appropriate      Next Visit Date:  6/7/19     Health Maintenance   Topic Date Due    HIV screen  01/01/1981    Hepatitis B Vaccine (1 of 3 - Risk 3-dose series) 01/01/1985    Shingles Vaccine (1 of 2) 01/01/2016    Colon cancer screen colonoscopy  01/01/2016    Breast cancer screen  12/02/2017    Cervical cancer screen  12/02/2018    Diabetic foot exam  12/14/2018    Diabetic microalbuminuria test  03/15/2019    Diabetic retinal exam  06/15/2019 (Originally 12/2/2016)    Potassium monitoring  06/15/2019 (Originally 8/24/2017)    Creatinine monitoring  06/15/2019 (Originally 6/6/2018)    A1C test (Diabetic or Prediabetic)  06/11/2019    Lipid screen  06/18/2019    DTaP/Tdap/Td vaccine (2 - Td) 08/13/2025    Flu vaccine  Completed    Pneumococcal 0-64 years Vaccine  Completed       Hemoglobin A1C (%)   Date Value   03/11/2019 12.9   11/05/2018 11.5   06/18/2018 10.6             ( goal A1C is < 7)   Microalb/Crt.  Ratio (mcg/mg creat)   Date Value   03/15/2018 871 (H)     LDL Cholesterol (mg/dL)   Date Value   06/18/2018            (goal LDL is <100)   AST (U/L)   Date Value   08/12/2015 6     ALT (U/L)   Date Value   08/12/2015 9     BUN (mg/dL)   Date Value   06/06/2017 16     BP Readings from Last 3 Encounters:   03/11/19 (!) 148/70   11/05/18 (!) 181/84   07/23/18 (!) 176/75          (goal 120/80)          Patient Active Problem List:     DM type 2 (diabetes mellitus, type 2) ([address]     HTN (hypertension)     GERD (gastroesophageal reflux disease)     Hyperlipidemia     Claudication of lower extremity (HCC)     Diabetes mellitus ([address]     Panic attack     Leg pain, diffuse     Coronary artery disease involving native heart without angina pectoris     Diabetic polyneuropathy associated with type 2 diabetes mellitus (HCC)     Neuropathy

## 2019-05-02 RX ORDER — TRAMADOL HYDROCHLORIDE 50 MG/1
50 TABLET ORAL DAILY PRN
Qty: 30 TABLET | Refills: 0 | Status: SHIPPED | OUTPATIENT
Start: 2019-05-02 | End: 2019-05-30 | Stop reason: SDUPTHER

## 2019-05-29 DIAGNOSIS — M79.606 LOWER EXTREMITY PAIN, DIFFUSE, UNSPECIFIED LATERALITY: ICD-10-CM

## 2019-05-29 NOTE — TELEPHONE ENCOUNTER
Refill requested. Last visit: 3/11/19  Last Med refill: 5/1/19  Does patient have enough medication for 72 hours: Yes    Next Visit Date:  Future Appointments   Date Time Provider Leonel Stephens   6/17/2019 11:00 AM Kasey Cam MD [facility] Maintenance   Topic Date Due    HIV screen  01/01/1981    Hepatitis B Vaccine (1 of 3 - Risk 3-dose series) 01/01/1985    Shingles Vaccine (1 of 2) 01/01/2016    Colon cancer screen colonoscopy  01/01/2016    Breast cancer screen  12/02/2017    Cervical cancer screen  12/02/2018    Diabetic foot exam  12/14/2018    Diabetic microalbuminuria test  03/15/2019    A1C test (Diabetic or Prediabetic)  06/11/2019    Diabetic retinal exam  06/15/2019 (Originally 12/2/2016)    Potassium monitoring  06/15/2019 (Originally 8/24/2017)    Creatinine monitoring  06/15/2019 (Originally 6/6/2018)    Lipid screen  06/18/2019    DTaP/Tdap/Td vaccine (2 - Td) 08/13/2025    Flu vaccine  Completed    Pneumococcal 0-64 years Vaccine  Completed       Hemoglobin A1C (%)   Date Value   03/11/2019 12.9   11/05/2018 11.5   06/18/2018 10.6             ( goal A1C is < 7)   Microalb/Crt.  Ratio (mcg/mg creat)   Date Value   03/15/2018 871 (H)     LDL Cholesterol (mg/dL)   Date Value   06/18/2018 08/12/2015 123       (goal LDL is <100)   AST (U/L)   Date Value   08/12/2015 6     ALT (U/L)   Date Value   08/12/2015 9     BUN (mg/dL)   Date Value   06/06/2017 16     BP Readings from Last 3 Encounters:   03/11/19 (!) 148/70   11/05/18 (!) 181/84   07/23/18 (!) 176/75          (goal 120/80)    All Future Testing planned in [facility]  Lab Frequency Next Occurrence   DENTON Digital Screen Bilateral [[id number]] Once 05/26/2019   Lipid Panel Once 06/17/2019   Comprehensive Metabolic Panel Once 95/30/9820   HIV-1 And HIV-2 Antibodies Once 06/11/2019               Patient Active Problem List:     DM type 2 (diabetes mellitus, type 2) ([address]     HTN (hypertension)     GERD (gastroesophageal reflux disease)     Hyperlipidemia     Claudication of lower extremity (HCC)     Diabetes mellitus (HCC)     Panic attack     Leg pain, diffuse     Coronary artery disease involving native heart without angina pectoris     Diabetic polyneuropathy associated with type 2 diabetes mellitus (HCC)     Neuropathy

## 2019-05-30 RX ORDER — TRAMADOL HYDROCHLORIDE 50 MG/1
50 TABLET ORAL DAILY PRN
Qty: 30 TABLET | Refills: 0 | Status: SHIPPED | OUTPATIENT
Start: 2019-05-30 | End: 2019-06-29

## 2019-06-25 ENCOUNTER — TELEPHONE (OUTPATIENT)
Dept: INTERNAL MEDICINE | Age: 54
End: 2019-06-25

## 2019-06-28 DIAGNOSIS — M79.606 LOWER EXTREMITY PAIN, DIFFUSE, UNSPECIFIED LATERALITY: ICD-10-CM

## 2019-06-28 NOTE — TELEPHONE ENCOUNTER
involving native heart without angina pectoris     Diabetic polyneuropathy associated with type 2 diabetes mellitus (HCC)     Neuropathy

## 2019-07-01 RX ORDER — TRAMADOL HYDROCHLORIDE 50 MG/1
50 TABLET ORAL DAILY PRN
Qty: 30 TABLET | Refills: 0 | OUTPATIENT
Start: 2019-07-01 | End: 2019-07-31

## 2019-07-11 ENCOUNTER — HOSPITAL ENCOUNTER (OUTPATIENT)
Age: 54
Setting detail: SPECIMEN
Discharge: HOME | End: 2019-07-11
Payer: COMMERCIAL

## 2019-07-11 DIAGNOSIS — I10 ESSENTIAL HYPERTENSION: ICD-10-CM

## 2019-07-11 DIAGNOSIS — Z11.4 ENCOUNTER FOR SCREENING FOR HIV: ICD-10-CM

## 2019-07-11 DIAGNOSIS — E78.5 HYPERLIPIDEMIA, UNSPECIFIED HYPERLIPIDEMIA TYPE: ICD-10-CM

## 2019-07-11 LAB
ALBUMIN SERPL-MCNC: 4.1 G/DL (ref 3.5–5.2)
ALBUMIN/GLOBULIN RATIO: 1.3 (ref 1–2.5)
ALP BLD-CCNC: 74 U/L (ref 35–104)
ALT SERPL-CCNC: 9 U/L (ref 5–33)
ANION GAP SERPL CALCULATED.3IONS-SCNC: 14 MMOL/L (ref 9–17)
AST SERPL-CCNC: 5 U/L
BILIRUB SERPL-MCNC: <0.1 MG/DL (ref 0.3–1.2)
BUN BLDV-MCNC: 18 MG/DL (ref 6–20)
BUN/CREAT BLD: ABNORMAL (ref 9–20)
CALCIUM SERPL-MCNC: 9.4 MG/DL (ref 8.6–10.4)
CHLORIDE BLD-SCNC: 106 MMOL/L (ref 98–107)
CHOLESTEROL/HDL RATIO: 4.7
CHOLESTEROL: 170 MG/DL
CO2: 22 MMOL/L (ref 20–31)
CREAT SERPL-MCNC: 0.86 MG/DL (ref 0.5–0.9)
GFR AFRICAN AMERICAN: >60 ML/MIN
GFR NON-AFRICAN AMERICAN: >60 ML/MIN
GFR SERPL CREATININE-BSD FRML MDRD: ABNORMAL ML/MIN/{1.73_M2}
GFR SERPL CREATININE-BSD FRML MDRD: ABNORMAL ML/MIN/{1.73_M2}
GLUCOSE BLD-MCNC: 205 MG/DL (ref 70–99)
HDLC SERPL-MCNC: 36 MG/DL
HIV AG/AB: NONREACTIVE
LDL CHOLESTEROL: 90 MG/DL (ref 0–130)
POTASSIUM SERPL-SCNC: 4.9 MMOL/L (ref 3.7–5.3)
SODIUM BLD-SCNC: 142 MMOL/L (ref 135–144)
TOTAL PROTEIN: 7.2 G/DL (ref 6.4–8.3)
TRIGL SERPL-MCNC: 218 MG/DL
VLDLC SERPL CALC-MCNC: ABNORMAL MG/DL (ref 1–30)

## 2019-07-11 PROCEDURE — 36415 COLL VENOUS BLD VENIPUNCTURE: CPT

## 2019-07-11 PROCEDURE — 80053 COMPREHEN METABOLIC PANEL: CPT

## 2019-07-11 PROCEDURE — 80061 LIPID PANEL: CPT

## 2019-07-11 PROCEDURE — 87389 HIV-1 AG W/HIV-1&-2 AB AG IA: CPT

## 2019-07-30 ENCOUNTER — OFFICE VISIT (OUTPATIENT)
Dept: INTERNAL MEDICINE | Age: 54
End: 2019-07-30
Payer: COMMERCIAL

## 2019-07-30 VITALS
BODY MASS INDEX: 29.35 KG/M2 | SYSTOLIC BLOOD PRESSURE: 150 MMHG | WEIGHT: 187 LBS | HEART RATE: 87 BPM | HEIGHT: 67 IN | DIASTOLIC BLOOD PRESSURE: 62 MMHG

## 2019-07-30 DIAGNOSIS — I10 ESSENTIAL HYPERTENSION: Primary | ICD-10-CM

## 2019-07-30 DIAGNOSIS — Z79.4 TYPE 2 DIABETES MELLITUS WITH DIABETIC NEUROPATHY, WITH LONG-TERM CURRENT USE OF INSULIN (HCC): ICD-10-CM

## 2019-07-30 DIAGNOSIS — E11.40 TYPE 2 DIABETES MELLITUS WITH DIABETIC NEUROPATHY, WITH LONG-TERM CURRENT USE OF INSULIN (HCC): ICD-10-CM

## 2019-07-30 DIAGNOSIS — Z12.31 ENCOUNTER FOR SCREENING MAMMOGRAM FOR BREAST CANCER: ICD-10-CM

## 2019-07-30 DIAGNOSIS — I73.9 PVD (PERIPHERAL VASCULAR DISEASE) WITH CLAUDICATION (HCC): ICD-10-CM

## 2019-07-30 LAB — HBA1C MFR BLD: 8 %

## 2019-07-30 PROCEDURE — 99213 OFFICE O/P EST LOW 20 MIN: CPT | Performed by: STUDENT IN AN ORGANIZED HEALTH CARE EDUCATION/TRAINING PROGRAM

## 2019-07-30 PROCEDURE — 99211 OFF/OP EST MAY X REQ PHY/QHP: CPT | Performed by: INTERNAL MEDICINE

## 2019-07-30 PROCEDURE — 83036 HEMOGLOBIN GLYCOSYLATED A1C: CPT | Performed by: STUDENT IN AN ORGANIZED HEALTH CARE EDUCATION/TRAINING PROGRAM

## 2019-07-30 RX ORDER — BLOOD PRESSURE TEST KIT
1 KIT MISCELLANEOUS DAILY
Qty: 1 KIT | Refills: 0 | Status: ON HOLD | OUTPATIENT
Start: 2019-07-30 | End: 2019-12-07 | Stop reason: HOSPADM

## 2019-07-30 RX ORDER — LISINOPRIL AND HYDROCHLOROTHIAZIDE 25; 20 MG/1; MG/1
1 TABLET ORAL DAILY
Qty: 30 TABLET | Refills: 5 | Status: SHIPPED | OUTPATIENT
Start: 2019-07-30 | End: 2020-11-20 | Stop reason: SDUPTHER

## 2019-07-30 RX ORDER — TRAMADOL HYDROCHLORIDE 50 MG/1
50 TABLET ORAL DAILY PRN
Qty: 30 TABLET | Refills: 0 | Status: SHIPPED | OUTPATIENT
Start: 2019-07-30 | End: 2019-08-29

## 2019-07-30 ASSESSMENT — ENCOUNTER SYMPTOMS
PHOTOPHOBIA: 0
SORE THROAT: 0
COUGH: 0
SHORTNESS OF BREATH: 0
ABDOMINAL DISTENTION: 0
ABDOMINAL PAIN: 0

## 2019-07-30 NOTE — PROGRESS NOTES
placed in 2011    - She complains of worsening neuropathic symptoms in both her lower extremities and has now started in her upper extremities. She is on Neurontin 300 mg 3 times daily and she is requesting for her monthly tramadol refills. Was asked to make it to the appointment since she has not seen the doctor in several months.    - Recent labs 7/11/2019, lipid profile normal except for elevated triglycerides 218. CMP normal.  Creatinine normal 0.86. Patient Active Problem List   Diagnosis    DM type 2 (diabetes mellitus, type 2) ([facility]    HTN (hypertension)    GERD (gastroesophageal reflux disease)    Hyperlipidemia    Claudication of lower extremity (HCC)    Diabetes mellitus ([facility]    Panic attack    Leg pain, diffuse    Coronary artery disease involving native heart without angina pectoris    Diabetic polyneuropathy associated with type 2 diabetes mellitus ([facility]    Neuropathy       Health Maintenance Due   Topic Date Due    Hepatitis B Vaccine (1 of 3 - Risk 3-dose series) 01/01/1985    Shingles Vaccine (1 of 2) 01/01/2016    Colon cancer screen colonoscopy  01/01/2016    Diabetic retinal exam  12/02/2016    Breast cancer screen  12/02/2017    Cervical cancer screen  12/02/2018    Diabetic foot exam  12/14/2018    Diabetic microalbuminuria test  03/15/2019    A1C test (Diabetic or Prediabetic)  06/11/2019       No Known Allergies      Current Outpatient Medications   Medication Sig Dispense Refill    traMADol (ULTRAM) 50 MG tablet Take 1 tablet by mouth daily as needed for Pain for up to 30 days. Intended supply: 7 days.  Take lowest dose possible to manage pain 30 tablet 0    Blood Pressure KIT 1 applicator by Does not apply route daily 1 kit 0    lisinopril-hydrochlorothiazide (PRINZIDE;ZESTORETIC) 20-25 MG per tablet Take 1 tablet by mouth daily 30 tablet 5    Insulin Pen Needle (PEN NEEDLES) 31G X 6 MM MISC 1 each by Does not apply route daily 100 each 3    metoprolol hypertension. Diagnoses and all orders for this visit:    Essential hypertension  -     Microalbumin, Ur; Future  -     Blood Pressure KIT; 1 applicator by Does not apply route daily  -     lisinopril-hydrochlorothiazide (PRINZIDE;ZESTORETIC) 20-25 MG per tablet; Take 1 tablet by mouth daily (HCTZ increased to 25 from 12.5)  -     Continue Toprol XL 50 mg daily    Type 2 diabetes mellitus with diabetic neuropathy, with long-term current use of insulin ([facility])  -      DIABETES FOOT EXAM  -     POCT glycosylated hemoglobin (Hb A1C) 8.0 this visit  -     Continue Lantus 20U nightly and Metformin 1000 mg BID. F/U HbA1C in 3 months  -     Microalbumin, Ur; Future  -     traMADol (ULTRAM) 50 MG tablet; Take 1 tablet by mouth daily as needed for Pain for up to 30 days. Take lowest dose possible to manage pain    PVD (peripheral vascular disease) with claudication ([facility])         -    Continue aspirin, Plavix and Lipitor    Encounter for screening mammogram for breast cancer  -     [facility] Digital Screen Bilateral [[id number]]; Future      FOLLOW UP AND INSTRUCTIONS:  Return in about 3 months (around 10/30/2019). · Anna Nunes received counseling on the following healthy behaviors: nutrition, exercise and medication adherence    · Discussed use, benefit, and side effects of prescribed medications. Barriers to medication compliance addressed. All patient questions answered. Pt voiced understanding. · Patient given educational materials - see patient instructions    Tiago Felder MD  PGY-2 Internal Medicine Resident  [address] [place], [place]  7/30/2019 12:13 PM   Attending Physician Statement  I have discussed the care of Angelica Nunn, including pertinent history and exam findings,  with the resident. I have reviewed the key elements of all parts of the encounter with the resident. I agree with the assessment, plan and orders as documented by the resident.   (GE Modifier)  Hx of med noncompliance. BP improved, but still a bit suboptimal. Will increase HCTZ. This note is created with the assistance of a speech-recognition program. While intending to generate a document that actually reflects the content of thevisit, the document can still have some mistakes which may not have been identified and corrected by editing.

## 2019-08-07 DIAGNOSIS — M79.606 LOWER EXTREMITY PAIN, DIFFUSE, UNSPECIFIED LATERALITY: ICD-10-CM

## 2019-08-08 NOTE — TELEPHONE ENCOUNTER
Refill request for Gabapentin. If appropriate please send medication(s) to patients pharmacy. Next appt: 11/05/2019- [address] Maintenance   Topic Date Due    Hepatitis B Vaccine (1 of 3 - Risk 3-dose series) 01/01/1985    Shingles Vaccine (1 of 2) 01/01/2016    Colon cancer screen colonoscopy  01/01/2016    Diabetic retinal exam  12/02/2016    Breast cancer screen  12/02/2017    Cervical cancer screen  12/02/2018    Diabetic microalbuminuria test  03/15/2019    Flu vaccine (1) 09/01/2019    Lipid screen  07/11/2020    Potassium monitoring  07/11/2020    Creatinine monitoring  07/11/2020    Diabetic foot exam  07/30/2020    A1C test (Diabetic or Prediabetic)  07/30/2020    DTaP/Tdap/Td vaccine (2 - Td) 08/13/2025    Pneumococcal 0-64 years Vaccine  Completed    HIV screen  Completed       Hemoglobin A1C (%)   Date Value   07/30/2019 8.0   03/11/2019 12.9   11/05/2018 11.5             ( goal A1C is < 7)   Microalb/Crt.  Ratio (mcg/mg creat)   Date Value   03/15/2018 871 (H)     LDL Cholesterol (mg/dL)   Date Value   07/11/2019 90       (goal LDL is <100)   AST (U/L)   Date Value   07/11/2019 5     ALT (U/L)   Date Value   07/11/2019 9     BUN (mg/dL)   Date Value   07/11/2019 18     BP Readings from Last 3 Encounters:   07/30/19 (!) 150/62   03/11/19 (!) 148/70   11/05/18 (!) 181/84          (goal 120/80)          Patient Active Problem List:     DM type 2 (diabetes mellitus, type 2) (HCC)     HTN (hypertension)     GERD (gastroesophageal reflux disease)     Hyperlipidemia     Claudication of lower extremity (HCC)     Diabetes mellitus ([address]     Panic attack     Leg pain, diffuse     Coronary artery disease involving native heart without angina pectoris     Diabetic polyneuropathy associated with type 2 diabetes mellitus (HCC)     Neuropathy

## 2019-08-09 RX ORDER — GABAPENTIN 300 MG/1
CAPSULE ORAL
Qty: 90 CAPSULE | Refills: 5 | Status: SHIPPED | OUTPATIENT
Start: 2019-08-09 | End: 2022-11-01 | Stop reason: ALTCHOICE

## 2019-08-21 ENCOUNTER — OFFICE VISIT (OUTPATIENT)
Dept: INTERNAL MEDICINE | Age: 54
End: 2019-08-21
Payer: COMMERCIAL

## 2019-08-21 VITALS — WEIGHT: 183 LBS | SYSTOLIC BLOOD PRESSURE: 134 MMHG | BODY MASS INDEX: 28.66 KG/M2 | DIASTOLIC BLOOD PRESSURE: 64 MMHG

## 2019-08-21 DIAGNOSIS — I10 ESSENTIAL HYPERTENSION: ICD-10-CM

## 2019-08-21 DIAGNOSIS — E78.5 HYPERLIPIDEMIA, UNSPECIFIED HYPERLIPIDEMIA TYPE: ICD-10-CM

## 2019-08-21 DIAGNOSIS — E11.621 DIABETIC ULCER OF RIGHT MIDFOOT ASSOCIATED WITH TYPE 2 DIABETES MELLITUS, WITH FAT LAYER EXPOSED (HCC): ICD-10-CM

## 2019-08-21 DIAGNOSIS — I73.9 CLAUDICATION OF LOWER EXTREMITY (HCC): ICD-10-CM

## 2019-08-21 DIAGNOSIS — E11.22 TYPE 2 DIABETES MELLITUS WITH CHRONIC KIDNEY DISEASE, WITHOUT LONG-TERM CURRENT USE OF INSULIN, UNSPECIFIED CKD STAGE (HCC): Primary | ICD-10-CM

## 2019-08-21 DIAGNOSIS — E11.621 DIABETIC ULCER OF RIGHT MIDFOOT ASSOCIATED WITH TYPE 2 DIABETES MELLITUS, UNSPECIFIED ULCER STAGE (HCC): ICD-10-CM

## 2019-08-21 DIAGNOSIS — L97.419 DIABETIC ULCER OF RIGHT MIDFOOT ASSOCIATED WITH TYPE 2 DIABETES MELLITUS, UNSPECIFIED ULCER STAGE (HCC): ICD-10-CM

## 2019-08-21 DIAGNOSIS — K21.9 GASTROESOPHAGEAL REFLUX DISEASE WITHOUT ESOPHAGITIS: ICD-10-CM

## 2019-08-21 DIAGNOSIS — L97.412 DIABETIC ULCER OF RIGHT MIDFOOT ASSOCIATED WITH TYPE 2 DIABETES MELLITUS, WITH FAT LAYER EXPOSED (HCC): ICD-10-CM

## 2019-08-21 PROCEDURE — 99214 OFFICE O/P EST MOD 30 MIN: CPT | Performed by: STUDENT IN AN ORGANIZED HEALTH CARE EDUCATION/TRAINING PROGRAM

## 2019-08-21 PROCEDURE — 99211 OFF/OP EST MAY X REQ PHY/QHP: CPT | Performed by: INTERNAL MEDICINE

## 2019-08-21 RX ORDER — TRAMADOL HYDROCHLORIDE 50 MG/1
50 TABLET ORAL EVERY 8 HOURS PRN
Qty: 30 TABLET | Refills: 0 | Status: SHIPPED | OUTPATIENT
Start: 2019-08-21 | End: 2019-10-08 | Stop reason: SDUPTHER

## 2019-08-21 RX ORDER — AMOXICILLIN AND CLAVULANATE POTASSIUM 875; 125 MG/1; MG/1
1 TABLET, FILM COATED ORAL 2 TIMES DAILY
Qty: 20 TABLET | Refills: 0 | Status: SHIPPED | OUTPATIENT
Start: 2019-08-21 | End: 2019-08-31

## 2019-08-21 NOTE — PROGRESS NOTES
07/11/2020    Creatinine monitoring  07/11/2020    Diabetic foot exam  07/30/2020    A1C test (Diabetic or Prediabetic)  07/30/2020    DTaP/Tdap/Td vaccine (2 - Td) 08/13/2025    Pneumococcal 0-64 years Vaccine  Completed    HIV screen  Completed
FINDINGS:    CBC:  Lab Results   Component Value Date    WBC 7.6 08/24/2016    HGB 14.6 08/24/2016     08/24/2016     05/26/2012     BMP:    Lab Results   Component Value Date     07/11/2019    K 4.9 07/11/2019     07/11/2019    CO2 22 07/11/2019    BUN 18 07/11/2019    CREATININE 0.86 07/11/2019    GLUCOSE 205 07/11/2019    GLUCOSE 167 05/26/2012     HEMOGLOBIN A1C:   Lab Results   Component Value Date    LABA1C 8.0 07/30/2019     MICROALBUMIN URINE:   Lab Results   Component Value Date    MICROALBUR 653 03/15/2018     FASTING LIPID PANEL:  Lab Results   Component Value Date    CHOL 170 07/11/2019    HDL 36 (L) 07/11/2019    TRIG 218 (H) 07/11/2019     LIVER PROFILE:  Lab Results   Component Value Date    ALT 9 07/11/2019    AST 5 07/11/2019    PROT 7.2 07/11/2019    BILITOT <0.10 07/11/2019    LABALBU 4.1 07/11/2019      THYROID FUNCTION:   Lab Results   Component Value Date    TSH 0.57 09/01/2011      URINE ANALYSIS: No results found for: LABURIN     ASSESSMENT AND PLAN:      1. Diabetic ulcer of right midfoot associated with type 2 diabetes mellitus  - Patient was recommended to go to ER for further evaluation of ulcer and concern for osteomyelitis. Patient refusing to go to ER. Wants to make an appointment with her vascular surgeon. Patient does not want us to make an appointment for her. She wants to make the appointment herself with the vascular surgeon.  - Patient was also recommended to go to wound care clinic which the patient refused. - X-ray right foot. - Amoxicillin-clavulanate 875 mg twice daily for 10 days.  -Tramadol 50 mg PRN for 10 days. Take lowest dose possible to manage pain. - Follow-up in 1 week. 2.Type 2 diabetes mellitus with chronic kidney disease, without long-term current use of insulin, unspecified CKD stage (HCC)  - HbA1c is 8.0 on 7/30/2019.  - Continue Lantus 20 units daily and metformin thousand MG twice daily.   - Referral to ophthalmologist for

## 2019-08-22 ENCOUNTER — TELEPHONE (OUTPATIENT)
Dept: INTERNAL MEDICINE | Age: 54
End: 2019-08-22

## 2019-08-23 ENCOUNTER — HOSPITAL ENCOUNTER (OUTPATIENT)
Dept: WOUND CARE | Age: 54
Discharge: HOME | End: 2019-08-23
Payer: COMMERCIAL

## 2019-08-23 VITALS
HEART RATE: 85 BPM | HEIGHT: 67 IN | RESPIRATION RATE: 18 BRPM | SYSTOLIC BLOOD PRESSURE: 145 MMHG | TEMPERATURE: 97.9 F | DIASTOLIC BLOOD PRESSURE: 70 MMHG | WEIGHT: 183 LBS | BODY MASS INDEX: 28.72 KG/M2

## 2019-08-23 DIAGNOSIS — I73.9 CLAUDICATION OF LOWER EXTREMITY (HCC): Primary | Chronic | ICD-10-CM

## 2019-08-23 DIAGNOSIS — L97.411 DIABETIC ULCER OF RIGHT MIDFOOT ASSOCIATED WITH TYPE 2 DIABETES MELLITUS, LIMITED TO BREAKDOWN OF SKIN (HCC): Chronic | ICD-10-CM

## 2019-08-23 DIAGNOSIS — E11.621 DIABETIC ULCER OF RIGHT MIDFOOT ASSOCIATED WITH TYPE 2 DIABETES MELLITUS, WITH FAT LAYER EXPOSED (HCC): Chronic | ICD-10-CM

## 2019-08-23 DIAGNOSIS — L97.412 DIABETIC ULCER OF RIGHT MIDFOOT ASSOCIATED WITH TYPE 2 DIABETES MELLITUS, WITH FAT LAYER EXPOSED (HCC): Chronic | ICD-10-CM

## 2019-08-23 DIAGNOSIS — L97.511 ISCHEMIC ULCER OF TOE OF RIGHT FOOT, LIMITED TO BREAKDOWN OF SKIN (HCC): Chronic | ICD-10-CM

## 2019-08-23 DIAGNOSIS — E11.42 DIABETIC POLYNEUROPATHY ASSOCIATED WITH TYPE 2 DIABETES MELLITUS (HCC): Chronic | ICD-10-CM

## 2019-08-23 DIAGNOSIS — E11.621 DIABETIC ULCER OF RIGHT MIDFOOT ASSOCIATED WITH TYPE 2 DIABETES MELLITUS, LIMITED TO BREAKDOWN OF SKIN (HCC): Chronic | ICD-10-CM

## 2019-08-23 PROBLEM — L97.419 DIABETIC ULCER OF RIGHT MIDFOOT ASSOCIATED WITH TYPE 2 DIABETES MELLITUS (HCC): Chronic | Status: ACTIVE | Noted: 2019-08-21

## 2019-08-23 PROCEDURE — 84155 ASSAY OF PROTEIN SERUM: CPT

## 2019-08-23 PROCEDURE — 84134 ASSAY OF PREALBUMIN: CPT

## 2019-08-23 PROCEDURE — 99213 OFFICE O/P EST LOW 20 MIN: CPT

## 2019-08-23 RX ORDER — LIDOCAINE HYDROCHLORIDE 40 MG/ML
SOLUTION TOPICAL ONCE
Status: CANCELLED | OUTPATIENT
Start: 2019-08-23 | End: 2019-08-23

## 2019-08-23 ASSESSMENT — PAIN SCALES - GENERAL: PAINLEVEL_OUTOF10: 10

## 2019-08-23 NOTE — PROGRESS NOTES
Readings from Last 3 Encounters:   08/23/19 183 lb (83 kg)   08/21/19 183 lb (83 kg)   07/30/19 187 lb (84.8 kg)       PHYSICAL EXAM    General Appearance: alert and oriented to person, place and time, well developed and well- nourished, in no acute distress  Skin: warm and dry, no rash or erythema  Head: normocephalic and atraumatic  Eyes: pupils equal, round, and reactive to light, extraocular eye movements intact, conjunctivae normal  ENT: tympanic membrane, external ear and ear canal normal bilaterally, nose without deformity, nasal mucosa and turbinates normal without polyps  Neck: supple and non-tender without mass, no thyromegaly or thyroid nodules, no cervical lymphadenopathy  Pulmonary/Chest: clear to auscultation bilaterally- no wheezes, rales or rhonchi, normal air movement, no respiratory distress  Cardiovascular: normal rate, regular rhythm, normal S1 and S2, no murmurs, rubs, clicks, or gallops, distal pulses intact, no carotid bruits  Abdomen: soft, non-tender, non-distended, normal bowel sounds, no masses or organomegaly  Extremities: right 5th digit is slightly dusky and there is a callous and blister on the lateral edge of foot.   She has a biphasic dopplerable pulse in the posterior tibial and anterior tibial  Artery on the right   Musculoskeletal: normal range of motion, no joint swelling, deformity or tenderness  Neurologic: reflexes normal and symmetric, no cranial nerve deficit, gait, coordination and speech normal      Assessment:     Problem List Items Addressed This Visit     Claudication of lower extremity ([address] - Primary (Chronic)    Relevant Orders    XR FOOT RIGHT (MIN 3 VIEWS)    VL Arterial PVR Lower    VL TCPO2 Multiple Sites    Diabetic polyneuropathy associated with type 2 diabetes mellitus (HCC) (Chronic)    Relevant Orders    XR FOOT RIGHT (MIN 3 VIEWS)    VL Arterial PVR Lower    VL TCPO2 Multiple Sites    Diabetic ulcer of right midfoot associated with type 2 diabetes mellitus ([address] (Chronic)    Relevant Orders    XR FOOT RIGHT (MIN 3 VIEWS)    VL Arterial PVR Lower    VL TCPO2 Multiple Sites    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 Performed by: Trevor Rock MD        Wound 08/23/19 Foot Right;Lateral #1 (Active)   Wound Image   8/23/2019  3:17 PM   Wound Traumatic 8/23/2019  3:17 PM   Dressing Status New drainage; Old drainage 8/23/2019  3:17 PM   Dressing Changed Changed/New 8/23/2019  3:17 PM   Wound Cleansed Other (Comment) 8/23/2019  3:17 PM   Wound Length (cm) 3.1 cm 8/23/2019  3:17 PM   Wound Width (cm) 4.2 cm 8/23/2019  3:17 PM   Wound Depth (cm) 0.1 cm 8/23/2019  3:17 PM   Wound Surface Area (cm^2) 13.02 cm^2 8/23/2019  3:17 PM   Wound Volume (cm^3) 1.3 cm^3 8/23/2019  3:17 PM   Post-Procedure Length (cm) 3.1 cm 8/23/2019  3:17 PM   Post-Procedure Width (cm) 4.2 cm 8/23/2019  3:17 PM   Post-Procedure Depth (cm) 0.1 cm 8/23/2019  3:17 PM   Post-Procedure Surface Area (cm^2) 13.02 cm^2 8/23/2019  3:17 PM   Post-Procedure Volume (cm^3) 1.3 cm^3 8/23/2019  3:17 PM   Wound Assessment Drainage;Edema;Gray;Light purple;Painful; White 8/23/2019  3:17 PM   Drainage Amount Moderate 8/23/2019  3:17 PM   Drainage Description Serous 8/23/2019  3:17 PM   Odor None 8/23/2019  3:17 PM   Margins Defined edges 8/23/2019  3:17 PM   Leatha-wound Assessment Painful;Purple;Red;Swelling 8/23/2019  3:17 PM   Red%Wound Bed 10 8/23/2019  3:17 PM   Other%Wound Bed 90 8/23/2019  3:17 PM   Number of days: 0       Plan:     Treatment Note please see attached Discharge Instructions    Written patient dismissal instructions given to patient and signed by patient or POA.          She doesn't

## 2019-08-23 NOTE — DISCHARGE INSTR - COC
Depth (cm) 0.1 cm 2019  3:17 PM   Post-Procedure Surface Area (cm^2) 13.02 cm^2 2019  3:17 PM   Post-Procedure Volume (cm^3) 1.3 cm^3 2019  3:17 PM   Wound Assessment Drainage;Edema;Gray;Light purple;Painful; White 2019  3:17 PM   Drainage Amount Moderate 2019  3:17 PM   Drainage Description Serous 2019  3:17 PM   Odor None 2019  3:17 PM   Margins Defined edges 2019  3:17 PM   Leatha-wound Assessment Painful;Purple;Red;Swelling 2019  3:17 PM   Red%Wound Bed 10 2019  3:17 PM   Other%Wound Bed 90 2019  3:17 PM   Number of days: 0        Elimination:  Continence:   · Bowel: {YES / LA:30030}  · Bladder: {YES / OM:38979}  Urinary Catheter: {Urinary Catheter:965718651}   Colostomy/Ileostomy/Ileal Conduit: {YES / YL:79052}       Date of Last BM: ***  No intake or output data in the 24 hours ending 19 1550  No intake/output data recorded.     Safety Concerns:     [employer] Safety Concerns:077164186}    Impairments/Disabilities:      [employer] Impairments/Disabilities:457712289}    Nutrition Therapy:  Current Nutrition Therapy:   [employer] Diet List:881246341}    Routes of Feeding: {CHP DME Other Feedings:597755392}  Liquids: {Slp liquid thickness:63365}  Daily Fluid Restriction: {CHP DME Yes amt example:388820522}  Last Modified Barium Swallow with Video (Video Swallowing Test): {Done Not Done XOGV:269666460}    Treatments at the Time of Hospital Discharge:   Respiratory Treatments: ***  Oxygen Therapy:  {Therapy; copd oxygen:86949}  Ventilator:    { CC Vent VLZH:981129840}    Rehab Therapies: {THERAPEUTIC INTERVENTION:3090818809}  Weight Bearing Status/Restrictions: [address] [employer]  Weight Bearin}  Other Medical Equipment (for information only, NOT a DME order):  {EQUIPMENT:714172169}  Other Treatments: ***    Patient's personal belongings (please select all that are sent with patient):  {[facility] DME Belongings:464277807}    RN SIGNATURE:  {Esignature:533301411}    CASE MANAGEMENT/SOCIAL WORK SECTION    Inpatient Status Date: ***    Readmission Risk Assessment Score:  Readmission Risk              Risk of Unplanned Readmission:        0           Discharging to Facility/ Agency   · Name:   · Address:  · Phone:  · Fax:    Dialysis Facility (if applicable)   · Name:  · Address:  · Dialysis Schedule:  · Phone:  · Fax:    / signature: {Esignature:079380478}    PHYSICIAN SECTION    Prognosis: {Prognosis:3641956055}    Condition at Discharge: [address] [place] [address] Patient Condition:707163582}    Rehab Potential (if transferring to Rehab): {Prognosis:2548984486}    Recommended Labs or Other Treatments After Discharge: ***    Physician Certification: I certify the above information and transfer of Donel [place]  is necessary for the continuing treatment of the diagnosis listed and that she requires {Admit to Appropriate Level of Care:52392} for {GREATER/LESS:137030452} 30 days.      Update Admission H&P: {CHP DME Changes in GDYNQ:887339296}    PHYSICIAN SIGNATURE:  {Esignature:315679069}

## 2019-09-06 ENCOUNTER — TELEPHONE (OUTPATIENT)
Dept: INTERNAL MEDICINE | Age: 54
End: 2019-09-06

## 2019-09-16 DIAGNOSIS — E11.621 DIABETIC ULCER OF RIGHT MIDFOOT ASSOCIATED WITH TYPE 2 DIABETES MELLITUS, WITH FAT LAYER EXPOSED (HCC): ICD-10-CM

## 2019-09-16 DIAGNOSIS — L97.412 DIABETIC ULCER OF RIGHT MIDFOOT ASSOCIATED WITH TYPE 2 DIABETES MELLITUS, WITH FAT LAYER EXPOSED (HCC): ICD-10-CM

## 2019-09-18 RX ORDER — TRAMADOL HYDROCHLORIDE 50 MG/1
50 TABLET ORAL EVERY 8 HOURS PRN
Qty: 30 TABLET | Refills: 0 | OUTPATIENT
Start: 2019-09-18 | End: 2019-09-28

## 2019-09-20 NOTE — TELEPHONE ENCOUNTER
Patient now needs to use [facility] for her RX's. Patient would like a 30 day supply for her diabetic insulin. Patient will be coming in and picking up a sample of the insulin to get her through next week.

## 2019-10-08 RX ORDER — TRAMADOL HYDROCHLORIDE 50 MG/1
50 TABLET ORAL EVERY 8 HOURS PRN
Qty: 30 TABLET | Refills: 0 | Status: SHIPPED | OUTPATIENT
Start: 2019-10-08 | End: 2019-10-18

## 2019-11-24 ENCOUNTER — APPOINTMENT (OUTPATIENT)
Dept: CT IMAGING | Age: 54
DRG: 871 | End: 2019-11-24
Payer: COMMERCIAL

## 2019-11-24 ENCOUNTER — HOSPITAL ENCOUNTER (INPATIENT)
Age: 54
LOS: 11 days | Discharge: ANOTHER HOSPITAL | DRG: 871 | End: 2019-12-05
Attending: EMERGENCY MEDICINE | Admitting: INTERNAL MEDICINE
Payer: COMMERCIAL

## 2019-11-24 DIAGNOSIS — L97.511 ISCHEMIC ULCER OF TOE OF RIGHT FOOT, LIMITED TO BREAKDOWN OF SKIN (HCC): Chronic | ICD-10-CM

## 2019-11-24 DIAGNOSIS — I10 ESSENTIAL HYPERTENSION: ICD-10-CM

## 2019-11-24 DIAGNOSIS — I73.9 CLAUDICATION OF LOWER EXTREMITY (HCC): Chronic | ICD-10-CM

## 2019-11-24 DIAGNOSIS — M54.2 NECK PAIN: Primary | ICD-10-CM

## 2019-11-24 PROBLEM — G03.9 MENINGITIS: Status: ACTIVE | Noted: 2019-11-24

## 2019-11-24 PROBLEM — A41.9 SEPSIS (HCC): Status: ACTIVE | Noted: 2019-11-24

## 2019-11-24 LAB
-: ABNORMAL
ABSOLUTE EOS #: 0 K/UL (ref 0–0.4)
ABSOLUTE IMMATURE GRANULOCYTE: 0.36 K/UL (ref 0–0.3)
ABSOLUTE LYMPH #: 1.08 K/UL (ref 1–4.8)
ABSOLUTE MONO #: 0.9 K/UL (ref 0.1–0.8)
ALBUMIN SERPL-MCNC: 2.3 G/DL (ref 3.5–5.2)
ALBUMIN/GLOBULIN RATIO: 0.5 (ref 1–2.5)
ALP BLD-CCNC: 198 U/L (ref 35–104)
ALT SERPL-CCNC: 7 U/L (ref 5–33)
AMORPHOUS: ABNORMAL
ANION GAP SERPL CALCULATED.3IONS-SCNC: 15 MMOL/L (ref 9–17)
AST SERPL-CCNC: <5 U/L
BACTERIA: ABNORMAL
BASOPHILS # BLD: 0 % (ref 0–2)
BASOPHILS ABSOLUTE: 0 K/UL (ref 0–0.2)
BILIRUB SERPL-MCNC: 0.38 MG/DL (ref 0.3–1.2)
BILIRUBIN URINE: NEGATIVE
BUN BLDV-MCNC: 33 MG/DL (ref 6–20)
BUN/CREAT BLD: ABNORMAL (ref 9–20)
CALCIUM SERPL-MCNC: 9.2 MG/DL (ref 8.6–10.4)
CASTS UA: ABNORMAL /LPF (ref 0–8)
CHLORIDE BLD-SCNC: 97 MMOL/L (ref 98–107)
CO2: 18 MMOL/L (ref 20–31)
COLOR: YELLOW
CREAT SERPL-MCNC: 0.99 MG/DL (ref 0.5–0.9)
CRYSTALS, UA: ABNORMAL /HPF
DIFFERENTIAL TYPE: ABNORMAL
EOSINOPHILS RELATIVE PERCENT: 0 % (ref 1–4)
EPITHELIAL CELLS UA: ABNORMAL /HPF (ref 0–5)
GFR AFRICAN AMERICAN: >60 ML/MIN
GFR NON-AFRICAN AMERICAN: 59 ML/MIN
GFR SERPL CREATININE-BSD FRML MDRD: ABNORMAL ML/MIN/{1.73_M2}
GFR SERPL CREATININE-BSD FRML MDRD: ABNORMAL ML/MIN/{1.73_M2}
GLUCOSE BLD-MCNC: 240 MG/DL (ref 70–99)
GLUCOSE URINE: NEGATIVE
HCT VFR BLD CALC: 25.1 % (ref 36.3–47.1)
HEMOGLOBIN: 8.1 G/DL (ref 11.9–15.1)
IMMATURE GRANULOCYTES: 2 %
INR BLD: 1
KETONES, URINE: ABNORMAL
LACTIC ACID, SEPSIS WHOLE BLOOD: 1.2 MMOL/L (ref 0.5–1.9)
LACTIC ACID, SEPSIS: NORMAL MMOL/L (ref 0.5–1.9)
LEUKOCYTE ESTERASE, URINE: NEGATIVE
LYMPHOCYTES # BLD: 6 % (ref 24–44)
MCH RBC QN AUTO: 28.7 PG (ref 25.2–33.5)
MCHC RBC AUTO-ENTMCNC: 32.3 G/DL (ref 28.4–34.8)
MCV RBC AUTO: 89 FL (ref 82.6–102.9)
MONOCYTES # BLD: 5 % (ref 1–7)
MORPHOLOGY: ABNORMAL
MORPHOLOGY: ABNORMAL
MUCUS: ABNORMAL
NITRITE, URINE: NEGATIVE
NRBC AUTOMATED: 0 PER 100 WBC
OTHER OBSERVATIONS UA: ABNORMAL
PARTIAL THROMBOPLASTIN TIME: 25 SEC (ref 20.5–30.5)
PDW BLD-RTO: 16.2 % (ref 11.8–14.4)
PH UA: 5.5 (ref 5–8)
PLATELET # BLD: 696 K/UL (ref 138–453)
PLATELET ESTIMATE: ABNORMAL
PMV BLD AUTO: 9.6 FL (ref 8.1–13.5)
POTASSIUM SERPL-SCNC: 4.2 MMOL/L (ref 3.7–5.3)
PROTEIN UA: ABNORMAL
PROTHROMBIN TIME: 10.7 SEC (ref 9–12)
RBC # BLD: 2.82 M/UL (ref 3.95–5.11)
RBC # BLD: ABNORMAL 10*6/UL
RBC UA: ABNORMAL /HPF (ref 0–4)
RENAL EPITHELIAL, UA: ABNORMAL /HPF
SEG NEUTROPHILS: 87 % (ref 36–66)
SEGMENTED NEUTROPHILS ABSOLUTE COUNT: 15.66 K/UL (ref 1.8–7.7)
SODIUM BLD-SCNC: 130 MMOL/L (ref 135–144)
SPECIFIC GRAVITY UA: 1.01 (ref 1–1.03)
TOTAL PROTEIN: 6.7 G/DL (ref 6.4–8.3)
TRICHOMONAS: ABNORMAL
TURBIDITY: ABNORMAL
URINE HGB: ABNORMAL
UROBILINOGEN, URINE: NORMAL
WBC # BLD: 18 K/UL (ref 3.5–11.3)
WBC # BLD: ABNORMAL 10*3/UL
WBC UA: ABNORMAL /HPF (ref 0–5)
YEAST: ABNORMAL

## 2019-11-24 PROCEDURE — 96375 TX/PRO/DX INJ NEW DRUG ADDON: CPT

## 2019-11-24 PROCEDURE — 2580000003 HC RX 258: Performed by: STUDENT IN AN ORGANIZED HEALTH CARE EDUCATION/TRAINING PROGRAM

## 2019-11-24 PROCEDURE — 81001 URINALYSIS AUTO W/SCOPE: CPT

## 2019-11-24 PROCEDURE — 96366 THER/PROPH/DIAG IV INF ADDON: CPT

## 2019-11-24 PROCEDURE — 99285 EMERGENCY DEPT VISIT HI MDM: CPT

## 2019-11-24 PROCEDURE — 85610 PROTHROMBIN TIME: CPT

## 2019-11-24 PROCEDURE — 87070 CULTURE OTHR SPECIMN AEROBIC: CPT

## 2019-11-24 PROCEDURE — 83605 ASSAY OF LACTIC ACID: CPT

## 2019-11-24 PROCEDURE — 80053 COMPREHEN METABOLIC PANEL: CPT

## 2019-11-24 PROCEDURE — 009U3ZX DRAINAGE OF SPINAL CANAL, PERCUTANEOUS APPROACH, DIAGNOSTIC: ICD-10-PCS | Performed by: INTERNAL MEDICINE

## 2019-11-24 PROCEDURE — 96365 THER/PROPH/DIAG IV INF INIT: CPT

## 2019-11-24 PROCEDURE — 36415 COLL VENOUS BLD VENIPUNCTURE: CPT

## 2019-11-24 PROCEDURE — 87186 SC STD MICRODIL/AGAR DIL: CPT

## 2019-11-24 PROCEDURE — 72125 CT NECK SPINE W/O DYE: CPT

## 2019-11-24 PROCEDURE — 84157 ASSAY OF PROTEIN OTHER: CPT

## 2019-11-24 PROCEDURE — 87086 URINE CULTURE/COLONY COUNT: CPT

## 2019-11-24 PROCEDURE — 6370000000 HC RX 637 (ALT 250 FOR IP): Performed by: STUDENT IN AN ORGANIZED HEALTH CARE EDUCATION/TRAINING PROGRAM

## 2019-11-24 PROCEDURE — 62270 DX LMBR SPI PNXR: CPT

## 2019-11-24 PROCEDURE — 87040 BLOOD CULTURE FOR BACTERIA: CPT

## 2019-11-24 PROCEDURE — 87205 SMEAR GRAM STAIN: CPT

## 2019-11-24 PROCEDURE — 1200000000 HC SEMI PRIVATE

## 2019-11-24 PROCEDURE — 6360000002 HC RX W HCPCS: Performed by: STUDENT IN AN ORGANIZED HEALTH CARE EDUCATION/TRAINING PROGRAM

## 2019-11-24 PROCEDURE — 87529 HSV DNA AMP PROBE: CPT

## 2019-11-24 PROCEDURE — 85730 THROMBOPLASTIN TIME PARTIAL: CPT

## 2019-11-24 PROCEDURE — 85025 COMPLETE CBC W/AUTO DIFF WBC: CPT

## 2019-11-24 PROCEDURE — 87150 DNA/RNA AMPLIFIED PROBE: CPT

## 2019-11-24 PROCEDURE — 99223 1ST HOSP IP/OBS HIGH 75: CPT | Performed by: INTERNAL MEDICINE

## 2019-11-24 PROCEDURE — 96376 TX/PRO/DX INJ SAME DRUG ADON: CPT

## 2019-11-24 PROCEDURE — 82945 GLUCOSE OTHER FLUID: CPT

## 2019-11-24 RX ORDER — DIAZEPAM 5 MG/1
10 TABLET ORAL ONCE
Status: COMPLETED | OUTPATIENT
Start: 2019-11-24 | End: 2019-11-24

## 2019-11-24 RX ORDER — 0.9 % SODIUM CHLORIDE 0.9 %
1000 INTRAVENOUS SOLUTION INTRAVENOUS ONCE
Status: COMPLETED | OUTPATIENT
Start: 2019-11-24 | End: 2019-11-25

## 2019-11-24 RX ORDER — SODIUM CHLORIDE 0.9 % (FLUSH) 0.9 %
10 SYRINGE (ML) INJECTION PRN
Status: DISCONTINUED | OUTPATIENT
Start: 2019-11-24 | End: 2019-12-05 | Stop reason: HOSPADM

## 2019-11-24 RX ORDER — SODIUM CHLORIDE 0.9 % (FLUSH) 0.9 %
10 SYRINGE (ML) INJECTION EVERY 12 HOURS SCHEDULED
Status: DISCONTINUED | OUTPATIENT
Start: 2019-11-25 | End: 2019-11-30

## 2019-11-24 RX ORDER — KETOROLAC TROMETHAMINE 15 MG/ML
15 INJECTION, SOLUTION INTRAMUSCULAR; INTRAVENOUS ONCE
Status: COMPLETED | OUTPATIENT
Start: 2019-11-24 | End: 2019-11-24

## 2019-11-24 RX ORDER — ACETAMINOPHEN 325 MG/1
650 TABLET ORAL EVERY 4 HOURS PRN
Status: DISCONTINUED | OUTPATIENT
Start: 2019-11-24 | End: 2019-11-25 | Stop reason: SDUPTHER

## 2019-11-24 RX ORDER — CYCLOBENZAPRINE HCL 5 MG
5 TABLET ORAL 2 TIMES DAILY PRN
Qty: 10 TABLET | Refills: 0 | Status: SHIPPED | OUTPATIENT
Start: 2019-11-24 | End: 2019-12-04

## 2019-11-24 RX ORDER — MIDAZOLAM HYDROCHLORIDE 1 MG/ML
1 INJECTION INTRAMUSCULAR; INTRAVENOUS
Status: COMPLETED | OUTPATIENT
Start: 2019-11-24 | End: 2019-11-24

## 2019-11-24 RX ORDER — FENTANYL CITRATE 50 UG/ML
50 INJECTION, SOLUTION INTRAMUSCULAR; INTRAVENOUS ONCE
Status: COMPLETED | OUTPATIENT
Start: 2019-11-24 | End: 2019-11-24

## 2019-11-24 RX ORDER — SODIUM CHLORIDE 9 MG/ML
INJECTION, SOLUTION INTRAVENOUS CONTINUOUS
Status: DISCONTINUED | OUTPATIENT
Start: 2019-11-25 | End: 2019-11-26

## 2019-11-24 RX ORDER — IBUPROFEN 800 MG/1
800 TABLET ORAL EVERY 8 HOURS PRN
Qty: 30 TABLET | Refills: 0 | Status: SHIPPED | OUTPATIENT
Start: 2019-11-24 | End: 2021-09-07

## 2019-11-24 RX ORDER — FENTANYL CITRATE 50 UG/ML
50 INJECTION, SOLUTION INTRAMUSCULAR; INTRAVENOUS ONCE
Status: COMPLETED | OUTPATIENT
Start: 2019-11-25 | End: 2019-11-24

## 2019-11-24 RX ORDER — SODIUM CHLORIDE 0.9 % (FLUSH) 0.9 %
10 SYRINGE (ML) INJECTION EVERY 12 HOURS SCHEDULED
Status: DISCONTINUED | OUTPATIENT
Start: 2019-11-24 | End: 2019-11-30

## 2019-11-24 RX ORDER — FENTANYL CITRATE 50 UG/ML
50 INJECTION, SOLUTION INTRAMUSCULAR; INTRAVENOUS
Status: COMPLETED | OUTPATIENT
Start: 2019-11-24 | End: 2019-11-24

## 2019-11-24 RX ORDER — ACETAMINOPHEN 325 MG/1
650 TABLET ORAL ONCE
Status: COMPLETED | OUTPATIENT
Start: 2019-11-25 | End: 2019-11-25

## 2019-11-24 RX ADMIN — FENTANYL CITRATE 50 MCG: 50 INJECTION, SOLUTION INTRAMUSCULAR; INTRAVENOUS at 21:21

## 2019-11-24 RX ADMIN — FENTANYL CITRATE 50 MCG: 50 INJECTION, SOLUTION INTRAMUSCULAR; INTRAVENOUS at 23:21

## 2019-11-24 RX ADMIN — MIDAZOLAM HYDROCHLORIDE 1 MG: 1 INJECTION, SOLUTION INTRAMUSCULAR; INTRAVENOUS at 23:34

## 2019-11-24 RX ADMIN — DIAZEPAM 10 MG: 5 TABLET ORAL at 17:44

## 2019-11-24 RX ADMIN — SODIUM CHLORIDE 1000 ML: 9 INJECTION, SOLUTION INTRAVENOUS at 22:30

## 2019-11-24 RX ADMIN — FENTANYL CITRATE 50 MCG: 50 INJECTION, SOLUTION INTRAMUSCULAR; INTRAVENOUS at 23:45

## 2019-11-24 RX ADMIN — KETOROLAC TROMETHAMINE 15 MG: 15 INJECTION, SOLUTION INTRAMUSCULAR; INTRAVENOUS at 21:22

## 2019-11-24 RX ADMIN — ACYCLOVIR SODIUM 615 MG: 50 INJECTION, SOLUTION INTRAVENOUS at 21:35

## 2019-11-24 RX ADMIN — MIDAZOLAM HYDROCHLORIDE 2 MG: 1 INJECTION, SOLUTION INTRAMUSCULAR; INTRAVENOUS at 23:32

## 2019-11-24 ASSESSMENT — PAIN SCALES - GENERAL
PAINLEVEL_OUTOF10: 7
PAINLEVEL_OUTOF10: 10

## 2019-11-24 ASSESSMENT — PAIN DESCRIPTION - PAIN TYPE: TYPE: ACUTE PAIN

## 2019-11-24 ASSESSMENT — PAIN DESCRIPTION - ORIENTATION: ORIENTATION: LEFT

## 2019-11-24 ASSESSMENT — PAIN DESCRIPTION - FREQUENCY: FREQUENCY: CONTINUOUS

## 2019-11-24 ASSESSMENT — PAIN DESCRIPTION - LOCATION: LOCATION: NECK

## 2019-11-25 ENCOUNTER — APPOINTMENT (OUTPATIENT)
Dept: CT IMAGING | Age: 54
DRG: 871 | End: 2019-11-25
Payer: COMMERCIAL

## 2019-11-25 ENCOUNTER — APPOINTMENT (OUTPATIENT)
Dept: MRI IMAGING | Age: 54
DRG: 871 | End: 2019-11-25
Payer: COMMERCIAL

## 2019-11-25 PROBLEM — D64.9 ANEMIA: Status: ACTIVE | Noted: 2019-11-25

## 2019-11-25 PROBLEM — D75.839 THROMBOCYTOSIS: Status: ACTIVE | Noted: 2019-11-25

## 2019-11-25 LAB
ABSOLUTE EOS #: <0.03 K/UL (ref 0–0.44)
ABSOLUTE IMMATURE GRANULOCYTE: 0.28 K/UL (ref 0–0.3)
ABSOLUTE LYMPH #: 0.73 K/UL (ref 1.1–3.7)
ABSOLUTE MONO #: 1.02 K/UL (ref 0.1–1.2)
ANION GAP SERPL CALCULATED.3IONS-SCNC: 12 MMOL/L (ref 9–17)
APPEARANCE CSF: ABNORMAL
BASOPHILS # BLD: 0 % (ref 0–2)
BASOPHILS ABSOLUTE: 0.04 K/UL (ref 0–0.2)
BUN BLDV-MCNC: 26 MG/DL (ref 6–20)
BUN/CREAT BLD: ABNORMAL (ref 9–20)
C-REACTIVE PROTEIN: 269.6 MG/L (ref 0–5)
CALCIUM SERPL-MCNC: 8.8 MG/DL (ref 8.6–10.4)
CHLORIDE BLD-SCNC: 99 MMOL/L (ref 98–107)
CO2: 19 MMOL/L (ref 20–31)
CREAT SERPL-MCNC: 0.82 MG/DL (ref 0.5–0.9)
DIFFERENTIAL TYPE: ABNORMAL
EOSINOPHILS RELATIVE PERCENT: 0 % (ref 1–4)
ESTIMATED AVERAGE GLUCOSE: 220 MG/DL
FOLATE: 7.9 NG/ML
GFR AFRICAN AMERICAN: >60 ML/MIN
GFR NON-AFRICAN AMERICAN: >60 ML/MIN
GFR SERPL CREATININE-BSD FRML MDRD: ABNORMAL ML/MIN/{1.73_M2}
GFR SERPL CREATININE-BSD FRML MDRD: ABNORMAL ML/MIN/{1.73_M2}
GLUCOSE BLD-MCNC: 211 MG/DL (ref 65–105)
GLUCOSE BLD-MCNC: 268 MG/DL (ref 65–105)
GLUCOSE BLD-MCNC: 304 MG/DL (ref 65–105)
GLUCOSE BLD-MCNC: 309 MG/DL (ref 70–99)
GLUCOSE BLD-MCNC: 313 MG/DL (ref 65–105)
GLUCOSE, CSF: 149 MG/DL (ref 40–70)
HBA1C MFR BLD: 9.3 % (ref 4–6)
HCT VFR BLD CALC: 23.7 % (ref 36.3–47.1)
HEMOGLOBIN: 7.5 G/DL (ref 11.9–15.1)
IMMATURE GRANULOCYTES: 2 %
IRON SATURATION: 18 % (ref 20–55)
IRON: 22 UG/DL (ref 37–145)
LACTIC ACID, SEPSIS WHOLE BLOOD: 0.8 MMOL/L (ref 0.5–1.9)
LACTIC ACID, SEPSIS: NORMAL MMOL/L (ref 0.5–1.9)
LYMPHOCYTES # BLD: 5 % (ref 24–43)
MCH RBC QN AUTO: 28.7 PG (ref 25.2–33.5)
MCHC RBC AUTO-ENTMCNC: 31.6 G/DL (ref 28.4–34.8)
MCV RBC AUTO: 90.8 FL (ref 82.6–102.9)
MONOCYTES # BLD: 7 % (ref 3–12)
NRBC AUTOMATED: 0 PER 100 WBC
PDW BLD-RTO: 16.4 % (ref 11.8–14.4)
PLATELET # BLD: 614 K/UL (ref 138–453)
PLATELET ESTIMATE: ABNORMAL
PMV BLD AUTO: 9.4 FL (ref 8.1–13.5)
POTASSIUM SERPL-SCNC: 3.7 MMOL/L (ref 3.7–5.3)
PROTEIN CSF: 64.7 MG/DL (ref 15–45)
RBC # BLD: 2.61 M/UL (ref 3.95–5.11)
RBC # BLD: ABNORMAL 10*6/UL
RBC CSF: 1677 /MM3
SEDIMENTATION RATE, ERYTHROCYTE: >130 MM (ref 0–20)
SEG NEUTROPHILS: 87 % (ref 36–65)
SEGMENTED NEUTROPHILS ABSOLUTE COUNT: 13.7 K/UL (ref 1.5–8.1)
SODIUM BLD-SCNC: 130 MMOL/L (ref 135–144)
SUPERNAT COLOR CSF: ABNORMAL
TOTAL IRON BINDING CAPACITY: 122 UG/DL (ref 250–450)
TUBE NUMBER CSF: 3
UNSATURATED IRON BINDING CAPACITY: 100 UG/DL (ref 112–347)
VITAMIN B-12: 651 PG/ML (ref 232–1245)
VOLUME CSF: 4
WBC # BLD: 15.8 K/UL (ref 3.5–11.3)
WBC # BLD: ABNORMAL 10*3/UL
WBC CSF: 3 /MM3
XANTHOCHROMIA: PRESENT

## 2019-11-25 PROCEDURE — 86038 ANTINUCLEAR ANTIBODIES: CPT

## 2019-11-25 PROCEDURE — 6370000000 HC RX 637 (ALT 250 FOR IP): Performed by: STUDENT IN AN ORGANIZED HEALTH CARE EDUCATION/TRAINING PROGRAM

## 2019-11-25 PROCEDURE — 82607 VITAMIN B-12: CPT

## 2019-11-25 PROCEDURE — 6360000004 HC RX CONTRAST MEDICATION: Performed by: STUDENT IN AN ORGANIZED HEALTH CARE EDUCATION/TRAINING PROGRAM

## 2019-11-25 PROCEDURE — 72158 MRI LUMBAR SPINE W/O & W/DYE: CPT

## 2019-11-25 PROCEDURE — 70553 MRI BRAIN STEM W/O & W/DYE: CPT

## 2019-11-25 PROCEDURE — 6360000002 HC RX W HCPCS: Performed by: INTERNAL MEDICINE

## 2019-11-25 PROCEDURE — A9576 INJ PROHANCE MULTIPACK: HCPCS | Performed by: STUDENT IN AN ORGANIZED HEALTH CARE EDUCATION/TRAINING PROGRAM

## 2019-11-25 PROCEDURE — 70498 CT ANGIOGRAPHY NECK: CPT

## 2019-11-25 PROCEDURE — 89050 BODY FLUID CELL COUNT: CPT

## 2019-11-25 PROCEDURE — 86403 PARTICLE AGGLUT ANTBDY SCRN: CPT

## 2019-11-25 PROCEDURE — 99254 IP/OBS CNSLTJ NEW/EST MOD 60: CPT | Performed by: NEUROLOGICAL SURGERY

## 2019-11-25 PROCEDURE — 6360000002 HC RX W HCPCS: Performed by: STUDENT IN AN ORGANIZED HEALTH CARE EDUCATION/TRAINING PROGRAM

## 2019-11-25 PROCEDURE — 87015 SPECIMEN INFECT AGNT CONCNTJ: CPT

## 2019-11-25 PROCEDURE — 87186 SC STD MICRODIL/AGAR DIL: CPT

## 2019-11-25 PROCEDURE — 82746 ASSAY OF FOLIC ACID SERUM: CPT

## 2019-11-25 PROCEDURE — 99223 1ST HOSP IP/OBS HIGH 75: CPT | Performed by: INTERNAL MEDICINE

## 2019-11-25 PROCEDURE — 2580000003 HC RX 258: Performed by: INTERNAL MEDICINE

## 2019-11-25 PROCEDURE — 83550 IRON BINDING TEST: CPT

## 2019-11-25 PROCEDURE — 82947 ASSAY GLUCOSE BLOOD QUANT: CPT

## 2019-11-25 PROCEDURE — 83605 ASSAY OF LACTIC ACID: CPT

## 2019-11-25 PROCEDURE — 72156 MRI NECK SPINE W/O & W/DYE: CPT

## 2019-11-25 PROCEDURE — 2580000003 HC RX 258: Performed by: STUDENT IN AN ORGANIZED HEALTH CARE EDUCATION/TRAINING PROGRAM

## 2019-11-25 PROCEDURE — 85025 COMPLETE CBC W/AUTO DIFF WBC: CPT

## 2019-11-25 PROCEDURE — 99254 IP/OBS CNSLTJ NEW/EST MOD 60: CPT | Performed by: INTERNAL MEDICINE

## 2019-11-25 PROCEDURE — 72157 MRI CHEST SPINE W/O & W/DYE: CPT

## 2019-11-25 PROCEDURE — 83036 HEMOGLOBIN GLYCOSYLATED A1C: CPT

## 2019-11-25 PROCEDURE — 2060000000 HC ICU INTERMEDIATE R&B

## 2019-11-25 PROCEDURE — 85651 RBC SED RATE NONAUTOMATED: CPT

## 2019-11-25 PROCEDURE — 87077 CULTURE AEROBIC IDENTIFY: CPT

## 2019-11-25 PROCEDURE — 86140 C-REACTIVE PROTEIN: CPT

## 2019-11-25 PROCEDURE — 83540 ASSAY OF IRON: CPT

## 2019-11-25 PROCEDURE — 80048 BASIC METABOLIC PNL TOTAL CA: CPT

## 2019-11-25 PROCEDURE — 36415 COLL VENOUS BLD VENIPUNCTURE: CPT

## 2019-11-25 RX ORDER — SODIUM HYPOCHLORITE 1.25 MG/ML
SOLUTION TOPICAL DAILY
Status: DISCONTINUED | OUTPATIENT
Start: 2019-11-25 | End: 2019-11-27

## 2019-11-25 RX ORDER — MIDAZOLAM HYDROCHLORIDE 1 MG/ML
1 INJECTION INTRAMUSCULAR; INTRAVENOUS ONCE
Status: COMPLETED | OUTPATIENT
Start: 2019-11-25 | End: 2019-11-24

## 2019-11-25 RX ORDER — ASPIRIN 81 MG/1
81 TABLET ORAL DAILY
Status: DISCONTINUED | OUTPATIENT
Start: 2019-11-25 | End: 2019-12-05 | Stop reason: HOSPADM

## 2019-11-25 RX ORDER — KETOROLAC TROMETHAMINE 15 MG/ML
30 INJECTION, SOLUTION INTRAMUSCULAR; INTRAVENOUS ONCE
Status: COMPLETED | OUTPATIENT
Start: 2019-11-25 | End: 2019-11-25

## 2019-11-25 RX ORDER — INSULIN GLARGINE 100 [IU]/ML
20 INJECTION, SOLUTION SUBCUTANEOUS NIGHTLY
Status: DISCONTINUED | OUTPATIENT
Start: 2019-11-25 | End: 2019-12-05 | Stop reason: HOSPADM

## 2019-11-25 RX ORDER — VANCOMYCIN HYDROCHLORIDE 1 G/200ML
1000 INJECTION, SOLUTION INTRAVENOUS EVERY 12 HOURS
Status: DISCONTINUED | OUTPATIENT
Start: 2019-11-25 | End: 2019-11-25

## 2019-11-25 RX ORDER — ATORVASTATIN CALCIUM 40 MG/1
40 TABLET, FILM COATED ORAL DAILY
Status: DISCONTINUED | OUTPATIENT
Start: 2019-11-25 | End: 2019-12-05 | Stop reason: HOSPADM

## 2019-11-25 RX ORDER — SODIUM CHLORIDE 0.9 % (FLUSH) 0.9 %
10 SYRINGE (ML) INJECTION 2 TIMES DAILY
Status: DISCONTINUED | OUTPATIENT
Start: 2019-11-25 | End: 2019-12-05 | Stop reason: HOSPADM

## 2019-11-25 RX ORDER — NICOTINE POLACRILEX 4 MG
15 LOZENGE BUCCAL PRN
Status: DISCONTINUED | OUTPATIENT
Start: 2019-11-25 | End: 2019-12-05 | Stop reason: HOSPADM

## 2019-11-25 RX ORDER — DEXTROSE MONOHYDRATE 50 MG/ML
100 INJECTION, SOLUTION INTRAVENOUS PRN
Status: DISCONTINUED | OUTPATIENT
Start: 2019-11-25 | End: 2019-12-05 | Stop reason: HOSPADM

## 2019-11-25 RX ORDER — SODIUM CHLORIDE 0.9 % (FLUSH) 0.9 %
10 SYRINGE (ML) INJECTION EVERY 12 HOURS SCHEDULED
Status: DISCONTINUED | OUTPATIENT
Start: 2019-11-25 | End: 2019-11-30

## 2019-11-25 RX ORDER — SODIUM CHLORIDE 0.9 % (FLUSH) 0.9 %
10 SYRINGE (ML) INJECTION PRN
Status: DISCONTINUED | OUTPATIENT
Start: 2019-11-25 | End: 2019-12-05 | Stop reason: HOSPADM

## 2019-11-25 RX ORDER — FAMOTIDINE 20 MG/1
20 TABLET, FILM COATED ORAL 2 TIMES DAILY
Status: DISCONTINUED | OUTPATIENT
Start: 2019-11-25 | End: 2019-12-05 | Stop reason: HOSPADM

## 2019-11-25 RX ORDER — TIZANIDINE 4 MG/1
4 TABLET ORAL EVERY 6 HOURS PRN
Status: DISCONTINUED | OUTPATIENT
Start: 2019-11-25 | End: 2019-11-30

## 2019-11-25 RX ORDER — DEXTROSE MONOHYDRATE 25 G/50ML
12.5 INJECTION, SOLUTION INTRAVENOUS PRN
Status: DISCONTINUED | OUTPATIENT
Start: 2019-11-25 | End: 2019-12-05 | Stop reason: HOSPADM

## 2019-11-25 RX ORDER — ACETAMINOPHEN 325 MG/1
650 TABLET ORAL EVERY 4 HOURS PRN
Status: DISCONTINUED | OUTPATIENT
Start: 2019-11-25 | End: 2019-12-05 | Stop reason: HOSPADM

## 2019-11-25 RX ORDER — KETOROLAC TROMETHAMINE 15 MG/ML
15 INJECTION, SOLUTION INTRAMUSCULAR; INTRAVENOUS ONCE
Status: COMPLETED | OUTPATIENT
Start: 2019-11-25 | End: 2019-11-25

## 2019-11-25 RX ORDER — INSULIN GLARGINE 100 [IU]/ML
20 INJECTION, SOLUTION SUBCUTANEOUS ONCE
Status: COMPLETED | OUTPATIENT
Start: 2019-11-25 | End: 2019-11-25

## 2019-11-25 RX ORDER — FENTANYL CITRATE 50 UG/ML
50 INJECTION, SOLUTION INTRAMUSCULAR; INTRAVENOUS ONCE
Status: COMPLETED | OUTPATIENT
Start: 2019-11-25 | End: 2019-11-25

## 2019-11-25 RX ORDER — MORPHINE SULFATE 2 MG/ML
2 INJECTION, SOLUTION INTRAMUSCULAR; INTRAVENOUS ONCE
Status: COMPLETED | OUTPATIENT
Start: 2019-11-25 | End: 2019-11-25

## 2019-11-25 RX ORDER — SODIUM CHLORIDE 9 MG/ML
INJECTION, SOLUTION INTRAVENOUS CONTINUOUS
Status: DISCONTINUED | OUTPATIENT
Start: 2019-11-25 | End: 2019-11-29

## 2019-11-25 RX ORDER — GABAPENTIN 100 MG/1
200 CAPSULE ORAL 3 TIMES DAILY
Status: DISCONTINUED | OUTPATIENT
Start: 2019-11-25 | End: 2019-12-02

## 2019-11-25 RX ORDER — ONDANSETRON 2 MG/ML
4 INJECTION INTRAMUSCULAR; INTRAVENOUS EVERY 6 HOURS PRN
Status: DISCONTINUED | OUTPATIENT
Start: 2019-11-25 | End: 2019-12-05 | Stop reason: HOSPADM

## 2019-11-25 RX ADMIN — FENTANYL CITRATE 50 MCG: 50 INJECTION, SOLUTION INTRAMUSCULAR; INTRAVENOUS at 12:34

## 2019-11-25 RX ADMIN — INSULIN GLARGINE 20 UNITS: 100 INJECTION, SOLUTION SUBCUTANEOUS at 20:51

## 2019-11-25 RX ADMIN — ACYCLOVIR SODIUM 615 MG: 50 INJECTION, SOLUTION INTRAVENOUS at 05:45

## 2019-11-25 RX ADMIN — AMPICILLIN SODIUM 2 G: 2 INJECTION, POWDER, FOR SOLUTION INTRAMUSCULAR; INTRAVENOUS at 08:43

## 2019-11-25 RX ADMIN — AMPICILLIN SODIUM 2 G: 2 INJECTION, POWDER, FOR SOLUTION INTRAMUSCULAR; INTRAVENOUS at 04:35

## 2019-11-25 RX ADMIN — ACETAMINOPHEN 650 MG: 325 TABLET ORAL at 20:43

## 2019-11-25 RX ADMIN — KETOROLAC TROMETHAMINE 30 MG: 15 INJECTION, SOLUTION INTRAMUSCULAR; INTRAVENOUS at 22:18

## 2019-11-25 RX ADMIN — MORPHINE SULFATE 2 MG: 2 INJECTION, SOLUTION INTRAMUSCULAR; INTRAVENOUS at 17:45

## 2019-11-25 RX ADMIN — CEFTAROLINE FOSAMIL 600 MG: 600 POWDER, FOR SOLUTION INTRAVENOUS at 19:12

## 2019-11-25 RX ADMIN — SODIUM CHLORIDE: 9 INJECTION, SOLUTION INTRAVENOUS at 03:38

## 2019-11-25 RX ADMIN — CEFTRIAXONE SODIUM 2 G: 2 INJECTION, POWDER, FOR SOLUTION INTRAMUSCULAR; INTRAVENOUS at 00:00

## 2019-11-25 RX ADMIN — INSULIN GLARGINE 20 UNITS: 100 INJECTION, SOLUTION SUBCUTANEOUS at 07:38

## 2019-11-25 RX ADMIN — ENOXAPARIN SODIUM 40 MG: 40 INJECTION SUBCUTANEOUS at 09:58

## 2019-11-25 RX ADMIN — ACETAMINOPHEN 650 MG: 325 TABLET ORAL at 03:37

## 2019-11-25 RX ADMIN — INSULIN LISPRO 3 UNITS: 100 INJECTION, SOLUTION INTRAVENOUS; SUBCUTANEOUS at 20:52

## 2019-11-25 RX ADMIN — VANCOMYCIN HYDROCHLORIDE 2000 MG: 1 INJECTION, POWDER, LYOPHILIZED, FOR SOLUTION INTRAVENOUS at 00:46

## 2019-11-25 RX ADMIN — TIZANIDINE 4 MG: 4 TABLET ORAL at 20:46

## 2019-11-25 RX ADMIN — Medication 81 MG: at 09:58

## 2019-11-25 RX ADMIN — TIZANIDINE 4 MG: 4 TABLET ORAL at 10:35

## 2019-11-25 RX ADMIN — SODIUM CHLORIDE: 9 INJECTION, SOLUTION INTRAVENOUS at 20:25

## 2019-11-25 RX ADMIN — ACETAMINOPHEN 650 MG: 325 TABLET ORAL at 14:44

## 2019-11-25 RX ADMIN — IOHEXOL 90 ML: 350 INJECTION, SOLUTION INTRAVENOUS at 09:05

## 2019-11-25 RX ADMIN — GADOTERIDOL 15 ML: 279.3 INJECTION, SOLUTION INTRAVENOUS at 13:57

## 2019-11-25 RX ADMIN — KETOROLAC TROMETHAMINE 15 MG: 15 INJECTION, SOLUTION INTRAMUSCULAR; INTRAVENOUS at 06:38

## 2019-11-25 ASSESSMENT — PAIN DESCRIPTION - DESCRIPTORS
DESCRIPTORS: CONSTANT
DESCRIPTORS: CONSTANT

## 2019-11-25 ASSESSMENT — PAIN SCALES - GENERAL
PAINLEVEL_OUTOF10: 10
PAINLEVEL_OUTOF10: 8
PAINLEVEL_OUTOF10: 10

## 2019-11-25 ASSESSMENT — ENCOUNTER SYMPTOMS
BACK PAIN: 0
CHEST TIGHTNESS: 0
SHORTNESS OF BREATH: 0
BLOOD IN STOOL: 0
WHEEZING: 0
RHINORRHEA: 0
GASTROINTESTINAL NEGATIVE: 1
COUGH: 0
VOMITING: 0
DIARRHEA: 0
STRIDOR: 0
SORE THROAT: 0
EYE REDNESS: 0
SINUS PAIN: 0
ABDOMINAL PAIN: 0
NAUSEA: 0
EYES NEGATIVE: 1
RESPIRATORY NEGATIVE: 1
PHOTOPHOBIA: 0
EYE PAIN: 0
CHOKING: 0
CONSTIPATION: 0

## 2019-11-25 ASSESSMENT — PAIN DESCRIPTION - ONSET
ONSET: ON-GOING
ONSET: ON-GOING

## 2019-11-25 ASSESSMENT — PAIN DESCRIPTION - ORIENTATION
ORIENTATION: LEFT
ORIENTATION: LEFT

## 2019-11-25 ASSESSMENT — PAIN DESCRIPTION - PAIN TYPE
TYPE: ACUTE PAIN

## 2019-11-25 ASSESSMENT — PAIN - FUNCTIONAL ASSESSMENT
PAIN_FUNCTIONAL_ASSESSMENT: PREVENTS OR INTERFERES WITH MANY ACTIVE NOT PASSIVE ACTIVITIES
PAIN_FUNCTIONAL_ASSESSMENT: PREVENTS OR INTERFERES WITH MANY ACTIVE NOT PASSIVE ACTIVITIES

## 2019-11-25 ASSESSMENT — PAIN DESCRIPTION - LOCATION
LOCATION: NECK

## 2019-11-25 ASSESSMENT — PAIN DESCRIPTION - FREQUENCY
FREQUENCY: CONTINUOUS

## 2019-11-25 ASSESSMENT — PAIN DESCRIPTION - PROGRESSION
CLINICAL_PROGRESSION: NOT CHANGED
CLINICAL_PROGRESSION: NOT CHANGED

## 2019-11-26 LAB
ABSOLUTE EOS #: 0.11 K/UL (ref 0–0.44)
ABSOLUTE IMMATURE GRANULOCYTE: 0.4 K/UL (ref 0–0.3)
ABSOLUTE LYMPH #: 1.31 K/UL (ref 1.1–3.7)
ABSOLUTE MONO #: 1.31 K/UL (ref 0.1–1.2)
ALBUMIN SERPL-MCNC: 1.8 G/DL (ref 3.5–5.2)
ALBUMIN/GLOBULIN RATIO: 0.5 (ref 1–2.5)
ALP BLD-CCNC: 125 U/L (ref 35–104)
ALT SERPL-CCNC: 6 U/L (ref 5–33)
ANION GAP SERPL CALCULATED.3IONS-SCNC: 10 MMOL/L (ref 9–17)
ANION GAP SERPL CALCULATED.3IONS-SCNC: 10 MMOL/L (ref 9–17)
ANTI-NUCLEAR ANTIBODY (ANA): NEGATIVE
AST SERPL-CCNC: <5 U/L
BASOPHILS # BLD: 0 % (ref 0–2)
BASOPHILS ABSOLUTE: 0.06 K/UL (ref 0–0.2)
BILIRUB SERPL-MCNC: 0.17 MG/DL (ref 0.3–1.2)
BILIRUBIN DIRECT: 0.09 MG/DL
BILIRUBIN, INDIRECT: 0.08 MG/DL (ref 0–1)
BUN BLDV-MCNC: 20 MG/DL (ref 6–20)
BUN BLDV-MCNC: 21 MG/DL (ref 6–20)
BUN/CREAT BLD: ABNORMAL (ref 9–20)
BUN/CREAT BLD: ABNORMAL (ref 9–20)
CALCIUM SERPL-MCNC: 8.8 MG/DL (ref 8.6–10.4)
CALCIUM SERPL-MCNC: 9.2 MG/DL (ref 8.6–10.4)
CHLORIDE BLD-SCNC: 101 MMOL/L (ref 98–107)
CHLORIDE BLD-SCNC: 103 MMOL/L (ref 98–107)
CO2: 20 MMOL/L (ref 20–31)
CO2: 22 MMOL/L (ref 20–31)
CREAT SERPL-MCNC: 0.85 MG/DL (ref 0.5–0.9)
CREAT SERPL-MCNC: 0.88 MG/DL (ref 0.5–0.9)
CULTURE: ABNORMAL
DIFFERENTIAL TYPE: ABNORMAL
EOSINOPHILS RELATIVE PERCENT: 1 % (ref 1–4)
GFR AFRICAN AMERICAN: >60 ML/MIN
GFR AFRICAN AMERICAN: >60 ML/MIN
GFR NON-AFRICAN AMERICAN: >60 ML/MIN
GFR NON-AFRICAN AMERICAN: >60 ML/MIN
GFR SERPL CREATININE-BSD FRML MDRD: ABNORMAL ML/MIN/{1.73_M2}
GLOBULIN: ABNORMAL G/DL (ref 1.5–3.8)
GLUCOSE BLD-MCNC: 135 MG/DL (ref 65–105)
GLUCOSE BLD-MCNC: 137 MG/DL (ref 65–105)
GLUCOSE BLD-MCNC: 151 MG/DL (ref 70–99)
GLUCOSE BLD-MCNC: 176 MG/DL (ref 65–105)
GLUCOSE BLD-MCNC: 177 MG/DL (ref 65–105)
GLUCOSE BLD-MCNC: 202 MG/DL (ref 70–99)
GLUCOSE BLD-MCNC: 205 MG/DL (ref 65–105)
GLUCOSE BLD-MCNC: 208 MG/DL (ref 65–105)
HCT VFR BLD CALC: 24.8 % (ref 36.3–47.1)
HEMOGLOBIN: 7.4 G/DL (ref 11.9–15.1)
IMMATURE GRANULOCYTES: 2 %
LACTIC ACID, WHOLE BLOOD: 1.4 MMOL/L (ref 0.7–2.1)
LYMPHOCYTES # BLD: 8 % (ref 24–43)
Lab: ABNORMAL
MCH RBC QN AUTO: 28.2 PG (ref 25.2–33.5)
MCHC RBC AUTO-ENTMCNC: 29.8 G/DL (ref 28.4–34.8)
MCV RBC AUTO: 94.7 FL (ref 82.6–102.9)
MONOCYTES # BLD: 8 % (ref 3–12)
NRBC AUTOMATED: 0 PER 100 WBC
PDW BLD-RTO: 16.3 % (ref 11.8–14.4)
PLATELET # BLD: 604 K/UL (ref 138–453)
PLATELET ESTIMATE: ABNORMAL
PMV BLD AUTO: 9.2 FL (ref 8.1–13.5)
POTASSIUM SERPL-SCNC: 3.7 MMOL/L (ref 3.7–5.3)
POTASSIUM SERPL-SCNC: 4.1 MMOL/L (ref 3.7–5.3)
RBC # BLD: 2.62 M/UL (ref 3.95–5.11)
RBC # BLD: ABNORMAL 10*6/UL
SEG NEUTROPHILS: 81 % (ref 36–65)
SEGMENTED NEUTROPHILS ABSOLUTE COUNT: 13.8 K/UL (ref 1.5–8.1)
SODIUM BLD-SCNC: 133 MMOL/L (ref 135–144)
SODIUM BLD-SCNC: 133 MMOL/L (ref 135–144)
SPECIMEN DESCRIPTION: ABNORMAL
TOTAL PROTEIN: 5.6 G/DL (ref 6.4–8.3)
TROPONIN INTERP: ABNORMAL
TROPONIN INTERP: ABNORMAL
TROPONIN T: ABNORMAL NG/ML
TROPONIN T: ABNORMAL NG/ML
TROPONIN, HIGH SENSITIVITY: 72 NG/L (ref 0–14)
TROPONIN, HIGH SENSITIVITY: 78 NG/L (ref 0–14)
WBC # BLD: 17 K/UL (ref 3.5–11.3)
WBC # BLD: ABNORMAL 10*3/UL

## 2019-11-26 PROCEDURE — 86403 PARTICLE AGGLUT ANTBDY SCRN: CPT

## 2019-11-26 PROCEDURE — 2580000003 HC RX 258: Performed by: STUDENT IN AN ORGANIZED HEALTH CARE EDUCATION/TRAINING PROGRAM

## 2019-11-26 PROCEDURE — 36415 COLL VENOUS BLD VENIPUNCTURE: CPT

## 2019-11-26 PROCEDURE — 82947 ASSAY GLUCOSE BLOOD QUANT: CPT

## 2019-11-26 PROCEDURE — 87186 SC STD MICRODIL/AGAR DIL: CPT

## 2019-11-26 PROCEDURE — 6360000002 HC RX W HCPCS: Performed by: INTERNAL MEDICINE

## 2019-11-26 PROCEDURE — 84484 ASSAY OF TROPONIN QUANT: CPT

## 2019-11-26 PROCEDURE — 80076 HEPATIC FUNCTION PANEL: CPT

## 2019-11-26 PROCEDURE — 87070 CULTURE OTHR SPECIMN AEROBIC: CPT

## 2019-11-26 PROCEDURE — 87076 CULTURE ANAEROBE IDENT EACH: CPT

## 2019-11-26 PROCEDURE — 6370000000 HC RX 637 (ALT 250 FOR IP): Performed by: STUDENT IN AN ORGANIZED HEALTH CARE EDUCATION/TRAINING PROGRAM

## 2019-11-26 PROCEDURE — 83605 ASSAY OF LACTIC ACID: CPT

## 2019-11-26 PROCEDURE — 2580000003 HC RX 258: Performed by: INTERNAL MEDICINE

## 2019-11-26 PROCEDURE — 99232 SBSQ HOSP IP/OBS MODERATE 35: CPT | Performed by: INTERNAL MEDICINE

## 2019-11-26 PROCEDURE — 87075 CULTR BACTERIA EXCEPT BLOOD: CPT

## 2019-11-26 PROCEDURE — 6360000002 HC RX W HCPCS: Performed by: STUDENT IN AN ORGANIZED HEALTH CARE EDUCATION/TRAINING PROGRAM

## 2019-11-26 PROCEDURE — 87205 SMEAR GRAM STAIN: CPT

## 2019-11-26 PROCEDURE — 85025 COMPLETE CBC W/AUTO DIFF WBC: CPT

## 2019-11-26 PROCEDURE — 93005 ELECTROCARDIOGRAM TRACING: CPT | Performed by: STUDENT IN AN ORGANIZED HEALTH CARE EDUCATION/TRAINING PROGRAM

## 2019-11-26 PROCEDURE — 2000000000 HC ICU R&B

## 2019-11-26 PROCEDURE — 80048 BASIC METABOLIC PNL TOTAL CA: CPT

## 2019-11-26 RX ORDER — KETOROLAC TROMETHAMINE 15 MG/ML
30 INJECTION, SOLUTION INTRAMUSCULAR; INTRAVENOUS EVERY 6 HOURS PRN
Status: DISPENSED | OUTPATIENT
Start: 2019-11-26 | End: 2019-12-01

## 2019-11-26 RX ORDER — SODIUM CHLORIDE, SODIUM LACTATE, POTASSIUM CHLORIDE, AND CALCIUM CHLORIDE .6; .31; .03; .02 G/100ML; G/100ML; G/100ML; G/100ML
1000 INJECTION, SOLUTION INTRAVENOUS ONCE
Status: COMPLETED | OUTPATIENT
Start: 2019-11-26 | End: 2019-11-26

## 2019-11-26 RX ADMIN — SODIUM CHLORIDE, PRESERVATIVE FREE 10 ML: 5 INJECTION INTRAVENOUS at 20:16

## 2019-11-26 RX ADMIN — GABAPENTIN 200 MG: 100 CAPSULE ORAL at 20:15

## 2019-11-26 RX ADMIN — ENOXAPARIN SODIUM 40 MG: 40 INJECTION SUBCUTANEOUS at 09:30

## 2019-11-26 RX ADMIN — CEFTAROLINE FOSAMIL 600 MG: 600 POWDER, FOR SOLUTION INTRAVENOUS at 20:16

## 2019-11-26 RX ADMIN — FAMOTIDINE 20 MG: 20 TABLET, FILM COATED ORAL at 20:15

## 2019-11-26 RX ADMIN — INSULIN GLARGINE 20 UNITS: 100 INJECTION, SOLUTION SUBCUTANEOUS at 20:50

## 2019-11-26 RX ADMIN — SODIUM CHLORIDE, PRESERVATIVE FREE 10 ML: 5 INJECTION INTRAVENOUS at 20:17

## 2019-11-26 RX ADMIN — INSULIN LISPRO 6 UNITS: 100 INJECTION, SOLUTION INTRAVENOUS; SUBCUTANEOUS at 11:47

## 2019-11-26 RX ADMIN — KETOROLAC TROMETHAMINE 30 MG: 15 INJECTION, SOLUTION INTRAMUSCULAR; INTRAVENOUS at 09:24

## 2019-11-26 RX ADMIN — SODIUM CHLORIDE: 9 INJECTION, SOLUTION INTRAVENOUS at 06:00

## 2019-11-26 RX ADMIN — DESMOPRESSIN ACETATE 40 MG: 0.2 TABLET ORAL at 09:31

## 2019-11-26 RX ADMIN — TIZANIDINE 4 MG: 4 TABLET ORAL at 20:15

## 2019-11-26 RX ADMIN — TIZANIDINE 4 MG: 4 TABLET ORAL at 14:56

## 2019-11-26 RX ADMIN — SODIUM CHLORIDE, POTASSIUM CHLORIDE, SODIUM LACTATE AND CALCIUM CHLORIDE 1000 ML: 600; 310; 30; 20 INJECTION, SOLUTION INTRAVENOUS at 17:36

## 2019-11-26 RX ADMIN — FAMOTIDINE 20 MG: 20 TABLET, FILM COATED ORAL at 09:31

## 2019-11-26 RX ADMIN — KETOROLAC TROMETHAMINE 30 MG: 15 INJECTION, SOLUTION INTRAMUSCULAR; INTRAVENOUS at 16:02

## 2019-11-26 RX ADMIN — Medication 81 MG: at 09:31

## 2019-11-26 RX ADMIN — SODIUM CHLORIDE, PRESERVATIVE FREE 10 ML: 5 INJECTION INTRAVENOUS at 09:35

## 2019-11-26 RX ADMIN — CEFTAROLINE FOSAMIL 600 MG: 600 POWDER, FOR SOLUTION INTRAVENOUS at 05:58

## 2019-11-26 RX ADMIN — INSULIN LISPRO 3 UNITS: 100 INJECTION, SOLUTION INTRAVENOUS; SUBCUTANEOUS at 20:51

## 2019-11-26 ASSESSMENT — PAIN DESCRIPTION - PROGRESSION
CLINICAL_PROGRESSION: NOT CHANGED

## 2019-11-26 ASSESSMENT — PAIN DESCRIPTION - LOCATION
LOCATION: NECK
LOCATION: NECK

## 2019-11-26 ASSESSMENT — PAIN SCALES - GENERAL
PAINLEVEL_OUTOF10: 10
PAINLEVEL_OUTOF10: 10
PAINLEVEL_OUTOF10: 2
PAINLEVEL_OUTOF10: 10
PAINLEVEL_OUTOF10: 8

## 2019-11-26 ASSESSMENT — PAIN DESCRIPTION - DESCRIPTORS
DESCRIPTORS: CONSTANT
DESCRIPTORS: CONSTANT

## 2019-11-26 ASSESSMENT — PAIN DESCRIPTION - ONSET
ONSET: ON-GOING
ONSET: ON-GOING

## 2019-11-26 ASSESSMENT — PAIN DESCRIPTION - FREQUENCY
FREQUENCY: CONTINUOUS
FREQUENCY: CONTINUOUS

## 2019-11-26 ASSESSMENT — PAIN DESCRIPTION - PAIN TYPE
TYPE: ACUTE PAIN
TYPE: ACUTE PAIN

## 2019-11-26 ASSESSMENT — PAIN DESCRIPTION - ORIENTATION
ORIENTATION: LEFT
ORIENTATION: LEFT

## 2019-11-26 ASSESSMENT — PAIN - FUNCTIONAL ASSESSMENT
PAIN_FUNCTIONAL_ASSESSMENT: PREVENTS OR INTERFERES WITH MANY ACTIVE NOT PASSIVE ACTIVITIES
PAIN_FUNCTIONAL_ASSESSMENT: PREVENTS OR INTERFERES SOME ACTIVE ACTIVITIES AND ADLS

## 2019-11-27 ENCOUNTER — APPOINTMENT (OUTPATIENT)
Dept: GENERAL RADIOLOGY | Age: 54
DRG: 871 | End: 2019-11-27
Payer: COMMERCIAL

## 2019-11-27 PROBLEM — R65.21 SEPTIC SHOCK (HCC): Status: ACTIVE | Noted: 2019-11-27

## 2019-11-27 PROBLEM — A41.9 SEPTIC SHOCK (HCC): Status: ACTIVE | Noted: 2019-11-27

## 2019-11-27 LAB
ABSOLUTE EOS #: 0.15 K/UL (ref 0–0.44)
ABSOLUTE IMMATURE GRANULOCYTE: 0.33 K/UL (ref 0–0.3)
ABSOLUTE LYMPH #: 1.08 K/UL (ref 1.1–3.7)
ABSOLUTE MONO #: 1.13 K/UL (ref 0.1–1.2)
ANION GAP SERPL CALCULATED.3IONS-SCNC: 8 MMOL/L (ref 9–17)
BASOPHILS # BLD: 0 % (ref 0–2)
BASOPHILS ABSOLUTE: 0.04 K/UL (ref 0–0.2)
BLOOD BANK SPECIMEN: NORMAL
BUN BLDV-MCNC: 20 MG/DL (ref 6–20)
BUN/CREAT BLD: ABNORMAL (ref 9–20)
CALCIUM SERPL-MCNC: 8.1 MG/DL (ref 8.6–10.4)
CHLORIDE BLD-SCNC: 105 MMOL/L (ref 98–107)
CO2: 20 MMOL/L (ref 20–31)
CREAT SERPL-MCNC: 0.73 MG/DL (ref 0.5–0.9)
CULTURE: ABNORMAL
DIFFERENTIAL TYPE: ABNORMAL
EKG ATRIAL RATE: 65 BPM
EKG P AXIS: 43 DEGREES
EKG P-R INTERVAL: 152 MS
EKG Q-T INTERVAL: 426 MS
EKG QRS DURATION: 94 MS
EKG QTC CALCULATION (BAZETT): 443 MS
EKG R AXIS: 3 DEGREES
EKG T AXIS: -133 DEGREES
EKG VENTRICULAR RATE: 65 BPM
EOSINOPHILS RELATIVE PERCENT: 1 % (ref 1–4)
GFR AFRICAN AMERICAN: >60 ML/MIN
GFR NON-AFRICAN AMERICAN: >60 ML/MIN
GFR SERPL CREATININE-BSD FRML MDRD: ABNORMAL ML/MIN/{1.73_M2}
GFR SERPL CREATININE-BSD FRML MDRD: ABNORMAL ML/MIN/{1.73_M2}
GLUCOSE BLD-MCNC: 132 MG/DL (ref 65–105)
GLUCOSE BLD-MCNC: 162 MG/DL (ref 65–105)
GLUCOSE BLD-MCNC: 164 MG/DL (ref 65–105)
GLUCOSE BLD-MCNC: 195 MG/DL (ref 70–99)
HCT VFR BLD CALC: 19 % (ref 36.3–47.1)
HCT VFR BLD CALC: 22.2 % (ref 36.3–47.1)
HCT VFR BLD CALC: 24.6 % (ref 36.3–47.1)
HCT VFR BLD CALC: 28.5 % (ref 36.3–47.1)
HEMOGLOBIN: 5.5 G/DL (ref 11.9–15.1)
HEMOGLOBIN: 6.8 G/DL (ref 11.9–15.1)
HEMOGLOBIN: 7.7 G/DL (ref 11.9–15.1)
HEMOGLOBIN: 8.8 G/DL (ref 11.9–15.1)
HSV BY PCR: NOT DETECTED
HSV SOURCE: NORMAL
IMMATURE GRANULOCYTES: 2 %
LYMPHOCYTES # BLD: 7 % (ref 24–43)
Lab: ABNORMAL
Lab: ABNORMAL
MCH RBC QN AUTO: 28.6 PG (ref 25.2–33.5)
MCHC RBC AUTO-ENTMCNC: 28.9 G/DL (ref 28.4–34.8)
MCV RBC AUTO: 99 FL (ref 82.6–102.9)
MONOCYTES # BLD: 8 % (ref 3–12)
NRBC AUTOMATED: 0 PER 100 WBC
PDW BLD-RTO: 16.6 % (ref 11.8–14.4)
PLATELET # BLD: 420 K/UL (ref 138–453)
PLATELET ESTIMATE: ABNORMAL
PMV BLD AUTO: 9.3 FL (ref 8.1–13.5)
POTASSIUM SERPL-SCNC: 3.9 MMOL/L (ref 3.7–5.3)
RBC # BLD: 1.92 M/UL (ref 3.95–5.11)
RBC # BLD: ABNORMAL 10*6/UL
SEG NEUTROPHILS: 82 % (ref 36–65)
SEGMENTED NEUTROPHILS ABSOLUTE COUNT: 12.37 K/UL (ref 1.5–8.1)
SODIUM BLD-SCNC: 133 MMOL/L (ref 135–144)
SPECIMEN DESCRIPTION: ABNORMAL
SPECIMEN DESCRIPTION: ABNORMAL
TROPONIN INTERP: ABNORMAL
TROPONIN T: ABNORMAL NG/ML
TROPONIN, HIGH SENSITIVITY: 72 NG/L (ref 0–14)
WBC # BLD: 15.1 K/UL (ref 3.5–11.3)
WBC # BLD: ABNORMAL 10*3/UL

## 2019-11-27 PROCEDURE — 36430 TRANSFUSION BLD/BLD COMPNT: CPT

## 2019-11-27 PROCEDURE — 6370000000 HC RX 637 (ALT 250 FOR IP): Performed by: STUDENT IN AN ORGANIZED HEALTH CARE EDUCATION/TRAINING PROGRAM

## 2019-11-27 PROCEDURE — 6370000000 HC RX 637 (ALT 250 FOR IP): Performed by: INTERNAL MEDICINE

## 2019-11-27 PROCEDURE — 86850 RBC ANTIBODY SCREEN: CPT

## 2019-11-27 PROCEDURE — 85025 COMPLETE CBC W/AUTO DIFF WBC: CPT

## 2019-11-27 PROCEDURE — 86901 BLOOD TYPING SEROLOGIC RH(D): CPT

## 2019-11-27 PROCEDURE — 2000000000 HC ICU R&B

## 2019-11-27 PROCEDURE — 2580000003 HC RX 258: Performed by: STUDENT IN AN ORGANIZED HEALTH CARE EDUCATION/TRAINING PROGRAM

## 2019-11-27 PROCEDURE — 6360000002 HC RX W HCPCS: Performed by: STUDENT IN AN ORGANIZED HEALTH CARE EDUCATION/TRAINING PROGRAM

## 2019-11-27 PROCEDURE — 6360000002 HC RX W HCPCS: Performed by: INTERNAL MEDICINE

## 2019-11-27 PROCEDURE — 6370000000 HC RX 637 (ALT 250 FOR IP): Performed by: SURGERY

## 2019-11-27 PROCEDURE — 82947 ASSAY GLUCOSE BLOOD QUANT: CPT

## 2019-11-27 PROCEDURE — 85018 HEMOGLOBIN: CPT

## 2019-11-27 PROCEDURE — 72040 X-RAY EXAM NECK SPINE 2-3 VW: CPT

## 2019-11-27 PROCEDURE — 6360000002 HC RX W HCPCS: Performed by: EMERGENCY MEDICINE

## 2019-11-27 PROCEDURE — 2580000003 HC RX 258: Performed by: INTERNAL MEDICINE

## 2019-11-27 PROCEDURE — 86920 COMPATIBILITY TEST SPIN: CPT

## 2019-11-27 PROCEDURE — 6360000002 HC RX W HCPCS

## 2019-11-27 PROCEDURE — 36415 COLL VENOUS BLD VENIPUNCTURE: CPT

## 2019-11-27 PROCEDURE — 99213 OFFICE O/P EST LOW 20 MIN: CPT

## 2019-11-27 PROCEDURE — P9016 RBC LEUKOCYTES REDUCED: HCPCS

## 2019-11-27 PROCEDURE — 85014 HEMATOCRIT: CPT

## 2019-11-27 PROCEDURE — 93010 ELECTROCARDIOGRAM REPORT: CPT | Performed by: INTERNAL MEDICINE

## 2019-11-27 PROCEDURE — 80048 BASIC METABOLIC PNL TOTAL CA: CPT

## 2019-11-27 PROCEDURE — 76937 US GUIDE VASCULAR ACCESS: CPT

## 2019-11-27 PROCEDURE — 86900 BLOOD TYPING SEROLOGIC ABO: CPT

## 2019-11-27 PROCEDURE — 99233 SBSQ HOSP IP/OBS HIGH 50: CPT | Performed by: INTERNAL MEDICINE

## 2019-11-27 PROCEDURE — 84484 ASSAY OF TROPONIN QUANT: CPT

## 2019-11-27 PROCEDURE — 99291 CRITICAL CARE FIRST HOUR: CPT | Performed by: INTERNAL MEDICINE

## 2019-11-27 RX ORDER — 0.9 % SODIUM CHLORIDE 0.9 %
1000 INTRAVENOUS SOLUTION INTRAVENOUS ONCE
Status: COMPLETED | OUTPATIENT
Start: 2019-11-27 | End: 2019-11-27

## 2019-11-27 RX ORDER — 0.9 % SODIUM CHLORIDE 0.9 %
250 INTRAVENOUS SOLUTION INTRAVENOUS ONCE
Status: DISCONTINUED | OUTPATIENT
Start: 2019-11-27 | End: 2019-12-01

## 2019-11-27 RX ORDER — FENTANYL CITRATE 50 UG/ML
INJECTION, SOLUTION INTRAMUSCULAR; INTRAVENOUS
Status: COMPLETED
Start: 2019-11-27 | End: 2019-11-27

## 2019-11-27 RX ORDER — FENTANYL CITRATE 50 UG/ML
50 INJECTION, SOLUTION INTRAMUSCULAR; INTRAVENOUS
Status: DISCONTINUED | OUTPATIENT
Start: 2019-11-27 | End: 2019-11-28

## 2019-11-27 RX ORDER — 0.9 % SODIUM CHLORIDE 0.9 %
250 INTRAVENOUS SOLUTION INTRAVENOUS ONCE
Status: DISCONTINUED | OUTPATIENT
Start: 2019-11-27 | End: 2019-11-27

## 2019-11-27 RX ORDER — FENTANYL CITRATE 50 UG/ML
25 INJECTION, SOLUTION INTRAMUSCULAR; INTRAVENOUS ONCE
Status: COMPLETED | OUTPATIENT
Start: 2019-11-27 | End: 2019-11-27

## 2019-11-27 RX ORDER — LANOLIN ALCOHOL/MO/W.PET/CERES
325 CREAM (GRAM) TOPICAL
Status: DISCONTINUED | OUTPATIENT
Start: 2019-11-27 | End: 2019-12-05 | Stop reason: HOSPADM

## 2019-11-27 RX ORDER — SODIUM HYPOCHLORITE 1.25 MG/ML
SOLUTION TOPICAL DAILY
Status: DISCONTINUED | OUTPATIENT
Start: 2019-11-28 | End: 2019-12-05 | Stop reason: HOSPADM

## 2019-11-27 RX ORDER — SODIUM HYPOCHLORITE 1.25 MG/ML
SOLUTION TOPICAL EVERY 8 HOURS SCHEDULED
Status: DISCONTINUED | OUTPATIENT
Start: 2019-11-27 | End: 2019-12-05 | Stop reason: HOSPADM

## 2019-11-27 RX ORDER — SODIUM HYPOCHLORITE 1.25 MG/ML
SOLUTION TOPICAL EVERY 8 HOURS SCHEDULED
Status: DISCONTINUED | OUTPATIENT
Start: 2019-11-27 | End: 2019-11-27 | Stop reason: SDUPTHER

## 2019-11-27 RX ADMIN — KETOROLAC TROMETHAMINE 30 MG: 15 INJECTION, SOLUTION INTRAMUSCULAR; INTRAVENOUS at 17:20

## 2019-11-27 RX ADMIN — INSULIN LISPRO 3 UNITS: 100 INJECTION, SOLUTION INTRAVENOUS; SUBCUTANEOUS at 10:33

## 2019-11-27 RX ADMIN — CEFTAROLINE FOSAMIL 600 MG: 600 POWDER, FOR SOLUTION INTRAVENOUS at 05:37

## 2019-11-27 RX ADMIN — SODIUM CHLORIDE, PRESERVATIVE FREE 10 ML: 5 INJECTION INTRAVENOUS at 10:36

## 2019-11-27 RX ADMIN — Medication 81 MG: at 10:40

## 2019-11-27 RX ADMIN — SODIUM CHLORIDE: 9 INJECTION, SOLUTION INTRAVENOUS at 02:14

## 2019-11-27 RX ADMIN — TIZANIDINE 4 MG: 4 TABLET ORAL at 02:14

## 2019-11-27 RX ADMIN — KETOROLAC TROMETHAMINE 30 MG: 15 INJECTION, SOLUTION INTRAMUSCULAR; INTRAVENOUS at 11:23

## 2019-11-27 RX ADMIN — DESMOPRESSIN ACETATE 40 MG: 0.2 TABLET ORAL at 10:40

## 2019-11-27 RX ADMIN — ENOXAPARIN SODIUM 40 MG: 40 INJECTION SUBCUTANEOUS at 10:47

## 2019-11-27 RX ADMIN — SODIUM CHLORIDE 1000 ML: 9 INJECTION, SOLUTION INTRAVENOUS at 04:04

## 2019-11-27 RX ADMIN — INSULIN GLARGINE 20 UNITS: 100 INJECTION, SOLUTION SUBCUTANEOUS at 20:25

## 2019-11-27 RX ADMIN — DAKIN'S SOLUTION 0.125% (QUARTER STRENGTH) 473 ML: 0.12 SOLUTION at 22:01

## 2019-11-27 RX ADMIN — KETOROLAC TROMETHAMINE 30 MG: 15 INJECTION, SOLUTION INTRAMUSCULAR; INTRAVENOUS at 02:14

## 2019-11-27 RX ADMIN — DAKIN'S SOLUTION 0.125% (QUARTER STRENGTH) 473 ML: 0.12 SOLUTION at 11:17

## 2019-11-27 RX ADMIN — TIZANIDINE 4 MG: 4 TABLET ORAL at 10:39

## 2019-11-27 RX ADMIN — FENTANYL CITRATE 25 MCG: 50 INJECTION, SOLUTION INTRAMUSCULAR; INTRAVENOUS at 09:27

## 2019-11-27 RX ADMIN — INSULIN LISPRO 2 UNITS: 100 INJECTION, SOLUTION INTRAVENOUS; SUBCUTANEOUS at 20:24

## 2019-11-27 RX ADMIN — CEFTAROLINE FOSAMIL 600 MG: 600 POWDER, FOR SOLUTION INTRAVENOUS at 17:42

## 2019-11-27 RX ADMIN — SODIUM CHLORIDE: 9 INJECTION, SOLUTION INTRAVENOUS at 17:40

## 2019-11-27 RX ADMIN — FENTANYL CITRATE 50 MCG: 50 INJECTION, SOLUTION INTRAMUSCULAR; INTRAVENOUS at 20:13

## 2019-11-27 RX ADMIN — INSULIN LISPRO 3 UNITS: 100 INJECTION, SOLUTION INTRAVENOUS; SUBCUTANEOUS at 12:42

## 2019-11-27 RX ADMIN — FAMOTIDINE 20 MG: 20 TABLET, FILM COATED ORAL at 10:40

## 2019-11-27 RX ADMIN — DEXTROSE MONOHYDRATE 200 MG: 50 INJECTION, SOLUTION INTRAVENOUS at 12:40

## 2019-11-27 RX ADMIN — FERROUS SULFATE TAB EC 325 MG (65 MG FE EQUIVALENT) 325 MG: 325 (65 FE) TABLET DELAYED RESPONSE at 10:40

## 2019-11-27 RX ADMIN — FAMOTIDINE 20 MG: 20 TABLET, FILM COATED ORAL at 20:22

## 2019-11-27 RX ADMIN — FENTANYL CITRATE 50 MCG: 50 INJECTION, SOLUTION INTRAMUSCULAR; INTRAVENOUS at 22:13

## 2019-11-27 ASSESSMENT — PAIN DESCRIPTION - LOCATION
LOCATION: NECK

## 2019-11-27 ASSESSMENT — PAIN SCALES - GENERAL
PAINLEVEL_OUTOF10: 0
PAINLEVEL_OUTOF10: 0
PAINLEVEL_OUTOF10: 10
PAINLEVEL_OUTOF10: 9
PAINLEVEL_OUTOF10: 10
PAINLEVEL_OUTOF10: 10
PAINLEVEL_OUTOF10: 9
PAINLEVEL_OUTOF10: 10
PAINLEVEL_OUTOF10: 4
PAINLEVEL_OUTOF10: 7
PAINLEVEL_OUTOF10: 7
PAINLEVEL_OUTOF10: 10
PAINLEVEL_OUTOF10: 1

## 2019-11-27 ASSESSMENT — PAIN DESCRIPTION - ORIENTATION: ORIENTATION: LEFT

## 2019-11-27 ASSESSMENT — PAIN DESCRIPTION - ONSET: ONSET: AWAKENED FROM SLEEP

## 2019-11-28 ENCOUNTER — APPOINTMENT (OUTPATIENT)
Dept: GENERAL RADIOLOGY | Age: 54
DRG: 871 | End: 2019-11-28
Payer: COMMERCIAL

## 2019-11-28 LAB
ABSOLUTE EOS #: 0.09 K/UL (ref 0–0.44)
ABSOLUTE IMMATURE GRANULOCYTE: 0.42 K/UL (ref 0–0.3)
ABSOLUTE LYMPH #: 0.9 K/UL (ref 1.1–3.7)
ABSOLUTE MONO #: 0.86 K/UL (ref 0.1–1.2)
ANION GAP SERPL CALCULATED.3IONS-SCNC: 10 MMOL/L (ref 9–17)
BASOPHILS # BLD: 0 % (ref 0–2)
BASOPHILS ABSOLUTE: 0.04 K/UL (ref 0–0.2)
BUN BLDV-MCNC: 13 MG/DL (ref 6–20)
BUN/CREAT BLD: ABNORMAL (ref 9–20)
CALCIUM SERPL-MCNC: 8.2 MG/DL (ref 8.6–10.4)
CHLORIDE BLD-SCNC: 105 MMOL/L (ref 98–107)
CO2: 19 MMOL/L (ref 20–31)
CREAT SERPL-MCNC: 0.62 MG/DL (ref 0.5–0.9)
CULTURE: ABNORMAL
CULTURE: ABNORMAL
DIFFERENTIAL TYPE: ABNORMAL
DIRECT EXAM: ABNORMAL
DIRECT EXAM: ABNORMAL
EOSINOPHILS RELATIVE PERCENT: 1 % (ref 1–4)
FERRITIN: 633 UG/L (ref 13–150)
GFR AFRICAN AMERICAN: >60 ML/MIN
GFR NON-AFRICAN AMERICAN: >60 ML/MIN
GFR SERPL CREATININE-BSD FRML MDRD: ABNORMAL ML/MIN/{1.73_M2}
GFR SERPL CREATININE-BSD FRML MDRD: ABNORMAL ML/MIN/{1.73_M2}
GLUCOSE BLD-MCNC: 101 MG/DL (ref 65–105)
GLUCOSE BLD-MCNC: 107 MG/DL (ref 65–105)
GLUCOSE BLD-MCNC: 143 MG/DL (ref 65–105)
GLUCOSE BLD-MCNC: 149 MG/DL (ref 70–99)
GLUCOSE BLD-MCNC: 73 MG/DL (ref 65–105)
HCT VFR BLD CALC: 27.5 % (ref 36.3–47.1)
HCT VFR BLD CALC: 27.8 % (ref 36.3–47.1)
HEMOGLOBIN: 8.7 G/DL (ref 11.9–15.1)
HEMOGLOBIN: 8.9 G/DL (ref 11.9–15.1)
IMMATURE GRANULOCYTES: 2 %
IRON SATURATION: 17 % (ref 20–55)
IRON: 23 UG/DL (ref 37–145)
LYMPHOCYTES # BLD: 5 % (ref 24–43)
Lab: ABNORMAL
MCH RBC QN AUTO: 29 PG (ref 25.2–33.5)
MCHC RBC AUTO-ENTMCNC: 32 G/DL (ref 28.4–34.8)
MCV RBC AUTO: 90.6 FL (ref 82.6–102.9)
MONOCYTES # BLD: 5 % (ref 3–12)
NRBC AUTOMATED: 0 PER 100 WBC
PDW BLD-RTO: 16 % (ref 11.8–14.4)
PLATELET # BLD: 478 K/UL (ref 138–453)
PLATELET ESTIMATE: ABNORMAL
PMV BLD AUTO: 9.1 FL (ref 8.1–13.5)
POTASSIUM SERPL-SCNC: 3.6 MMOL/L (ref 3.7–5.3)
RBC # BLD: 3.07 M/UL (ref 3.95–5.11)
RBC # BLD: ABNORMAL 10*6/UL
SEG NEUTROPHILS: 87 % (ref 36–65)
SEGMENTED NEUTROPHILS ABSOLUTE COUNT: 15.06 K/UL (ref 1.5–8.1)
SODIUM BLD-SCNC: 134 MMOL/L (ref 135–144)
SPECIMEN DESCRIPTION: ABNORMAL
TOTAL IRON BINDING CAPACITY: 133 UG/DL (ref 250–450)
TRANSFERRIN: 102 MG/DL (ref 200–360)
TROPONIN INTERP: ABNORMAL
TROPONIN T: ABNORMAL NG/ML
TROPONIN, HIGH SENSITIVITY: 62 NG/L (ref 0–14)
UNSATURATED IRON BINDING CAPACITY: 110 UG/DL (ref 112–347)
WBC # BLD: 17.4 K/UL (ref 3.5–11.3)
WBC # BLD: ABNORMAL 10*3/UL

## 2019-11-28 PROCEDURE — 6370000000 HC RX 637 (ALT 250 FOR IP): Performed by: STUDENT IN AN ORGANIZED HEALTH CARE EDUCATION/TRAINING PROGRAM

## 2019-11-28 PROCEDURE — 83540 ASSAY OF IRON: CPT

## 2019-11-28 PROCEDURE — 99232 SBSQ HOSP IP/OBS MODERATE 35: CPT | Performed by: INTERNAL MEDICINE

## 2019-11-28 PROCEDURE — 6360000002 HC RX W HCPCS: Performed by: STUDENT IN AN ORGANIZED HEALTH CARE EDUCATION/TRAINING PROGRAM

## 2019-11-28 PROCEDURE — 82947 ASSAY GLUCOSE BLOOD QUANT: CPT

## 2019-11-28 PROCEDURE — 71045 X-RAY EXAM CHEST 1 VIEW: CPT

## 2019-11-28 PROCEDURE — 85025 COMPLETE CBC W/AUTO DIFF WBC: CPT

## 2019-11-28 PROCEDURE — 2580000003 HC RX 258: Performed by: STUDENT IN AN ORGANIZED HEALTH CARE EDUCATION/TRAINING PROGRAM

## 2019-11-28 PROCEDURE — 85014 HEMATOCRIT: CPT

## 2019-11-28 PROCEDURE — 36415 COLL VENOUS BLD VENIPUNCTURE: CPT

## 2019-11-28 PROCEDURE — 82728 ASSAY OF FERRITIN: CPT

## 2019-11-28 PROCEDURE — 85018 HEMOGLOBIN: CPT

## 2019-11-28 PROCEDURE — 84466 ASSAY OF TRANSFERRIN: CPT

## 2019-11-28 PROCEDURE — 84484 ASSAY OF TROPONIN QUANT: CPT

## 2019-11-28 PROCEDURE — 6370000000 HC RX 637 (ALT 250 FOR IP): Performed by: INTERNAL MEDICINE

## 2019-11-28 PROCEDURE — 2580000003 HC RX 258: Performed by: INTERNAL MEDICINE

## 2019-11-28 PROCEDURE — 99291 CRITICAL CARE FIRST HOUR: CPT | Performed by: INTERNAL MEDICINE

## 2019-11-28 PROCEDURE — 80048 BASIC METABOLIC PNL TOTAL CA: CPT

## 2019-11-28 PROCEDURE — 87040 BLOOD CULTURE FOR BACTERIA: CPT

## 2019-11-28 PROCEDURE — 2060000000 HC ICU INTERMEDIATE R&B

## 2019-11-28 PROCEDURE — 83550 IRON BINDING TEST: CPT

## 2019-11-28 PROCEDURE — 6360000002 HC RX W HCPCS: Performed by: EMERGENCY MEDICINE

## 2019-11-28 PROCEDURE — 6360000002 HC RX W HCPCS: Performed by: INTERNAL MEDICINE

## 2019-11-28 RX ORDER — 0.9 % SODIUM CHLORIDE 0.9 %
250 INTRAVENOUS SOLUTION INTRAVENOUS ONCE
Status: DISCONTINUED | OUTPATIENT
Start: 2019-11-28 | End: 2019-11-28

## 2019-11-28 RX ORDER — POTASSIUM CHLORIDE 20 MEQ/1
20 TABLET, EXTENDED RELEASE ORAL ONCE
Status: COMPLETED | OUTPATIENT
Start: 2019-11-28 | End: 2019-11-28

## 2019-11-28 RX ORDER — ORPHENADRINE CITRATE 30 MG/ML
60 INJECTION INTRAMUSCULAR; INTRAVENOUS ONCE
Status: COMPLETED | OUTPATIENT
Start: 2019-11-28 | End: 2019-11-28

## 2019-11-28 RX ORDER — FUROSEMIDE 10 MG/ML
20 INJECTION INTRAMUSCULAR; INTRAVENOUS ONCE
Status: COMPLETED | OUTPATIENT
Start: 2019-11-28 | End: 2019-11-28

## 2019-11-28 RX ADMIN — DAKIN'S SOLUTION 0.125% (QUARTER STRENGTH) 473 ML: 0.12 SOLUTION at 21:48

## 2019-11-28 RX ADMIN — HYDROMORPHONE HYDROCHLORIDE 0.5 MG: 1 INJECTION, SOLUTION INTRAMUSCULAR; INTRAVENOUS; SUBCUTANEOUS at 08:29

## 2019-11-28 RX ADMIN — HYDROMORPHONE HYDROCHLORIDE 0.5 MG: 1 INJECTION, SOLUTION INTRAMUSCULAR; INTRAVENOUS; SUBCUTANEOUS at 18:11

## 2019-11-28 RX ADMIN — FUROSEMIDE 20 MG: 10 INJECTION, SOLUTION INTRAMUSCULAR; INTRAVENOUS at 10:19

## 2019-11-28 RX ADMIN — SODIUM CHLORIDE, PRESERVATIVE FREE 10 ML: 5 INJECTION INTRAVENOUS at 21:04

## 2019-11-28 RX ADMIN — FAMOTIDINE 20 MG: 20 TABLET, FILM COATED ORAL at 07:52

## 2019-11-28 RX ADMIN — FENTANYL CITRATE 50 MCG: 50 INJECTION, SOLUTION INTRAMUSCULAR; INTRAVENOUS at 03:56

## 2019-11-28 RX ADMIN — METOPROLOL TARTRATE 25 MG: 25 TABLET ORAL at 21:03

## 2019-11-28 RX ADMIN — INSULIN GLARGINE 20 UNITS: 100 INJECTION, SOLUTION SUBCUTANEOUS at 21:13

## 2019-11-28 RX ADMIN — ORPHENADRINE CITRATE 60 MG: 30 INJECTION INTRAMUSCULAR; INTRAVENOUS at 01:22

## 2019-11-28 RX ADMIN — FAMOTIDINE 20 MG: 20 TABLET, FILM COATED ORAL at 21:03

## 2019-11-28 RX ADMIN — SODIUM CHLORIDE: 9 INJECTION, SOLUTION INTRAVENOUS at 01:42

## 2019-11-28 RX ADMIN — HYDROMORPHONE HYDROCHLORIDE 0.5 MG: 1 INJECTION, SOLUTION INTRAMUSCULAR; INTRAVENOUS; SUBCUTANEOUS at 23:31

## 2019-11-28 RX ADMIN — DEXTROSE MONOHYDRATE 100 MG: 50 INJECTION, SOLUTION INTRAVENOUS at 11:52

## 2019-11-28 RX ADMIN — METOPROLOL TARTRATE 25 MG: 25 TABLET ORAL at 10:18

## 2019-11-28 RX ADMIN — SODIUM CHLORIDE: 9 INJECTION, SOLUTION INTRAVENOUS at 21:50

## 2019-11-28 RX ADMIN — SODIUM CHLORIDE: 9 INJECTION, SOLUTION INTRAVENOUS at 09:31

## 2019-11-28 RX ADMIN — FERROUS SULFATE TAB EC 325 MG (65 MG FE EQUIVALENT) 325 MG: 325 (65 FE) TABLET DELAYED RESPONSE at 07:52

## 2019-11-28 RX ADMIN — DAKIN'S SOLUTION 0.125% (QUARTER STRENGTH) 473 ML: 0.12 SOLUTION at 10:21

## 2019-11-28 RX ADMIN — Medication 81 MG: at 07:52

## 2019-11-28 RX ADMIN — CEFTAROLINE FOSAMIL 600 MG: 600 POWDER, FOR SOLUTION INTRAVENOUS at 06:20

## 2019-11-28 RX ADMIN — HYDROMORPHONE HYDROCHLORIDE 0.5 MG: 1 INJECTION, SOLUTION INTRAMUSCULAR; INTRAVENOUS; SUBCUTANEOUS at 10:22

## 2019-11-28 RX ADMIN — KETOROLAC TROMETHAMINE 30 MG: 15 INJECTION, SOLUTION INTRAMUSCULAR; INTRAVENOUS at 16:24

## 2019-11-28 RX ADMIN — ENOXAPARIN SODIUM 40 MG: 40 INJECTION SUBCUTANEOUS at 07:50

## 2019-11-28 RX ADMIN — DAKIN'S SOLUTION 0.125% (QUARTER STRENGTH) 473 ML: 0.12 SOLUTION at 15:05

## 2019-11-28 RX ADMIN — HYDROMORPHONE HYDROCHLORIDE 0.5 MG: 1 INJECTION, SOLUTION INTRAMUSCULAR; INTRAVENOUS; SUBCUTANEOUS at 12:51

## 2019-11-28 RX ADMIN — POTASSIUM CHLORIDE 20 MEQ: 1500 TABLET, EXTENDED RELEASE ORAL at 10:18

## 2019-11-28 RX ADMIN — HYDROMORPHONE HYDROCHLORIDE 0.5 MG: 1 INJECTION, SOLUTION INTRAMUSCULAR; INTRAVENOUS; SUBCUTANEOUS at 15:00

## 2019-11-28 RX ADMIN — CEFTAROLINE FOSAMIL 600 MG: 600 POWDER, FOR SOLUTION INTRAVENOUS at 17:48

## 2019-11-28 RX ADMIN — DAKIN'S SOLUTION 0.125% (QUARTER STRENGTH) 473 ML: 0.12 SOLUTION at 06:02

## 2019-11-28 RX ADMIN — DESMOPRESSIN ACETATE 40 MG: 0.2 TABLET ORAL at 07:52

## 2019-11-28 RX ADMIN — SODIUM CHLORIDE, PRESERVATIVE FREE 10 ML: 5 INJECTION INTRAVENOUS at 07:49

## 2019-11-28 RX ADMIN — HYDROMORPHONE HYDROCHLORIDE 0.5 MG: 1 INJECTION, SOLUTION INTRAMUSCULAR; INTRAVENOUS; SUBCUTANEOUS at 21:07

## 2019-11-28 RX ADMIN — INSULIN LISPRO 3 UNITS: 100 INJECTION, SOLUTION INTRAVENOUS; SUBCUTANEOUS at 07:56

## 2019-11-28 RX ADMIN — FENTANYL CITRATE 50 MCG: 50 INJECTION, SOLUTION INTRAMUSCULAR; INTRAVENOUS at 06:20

## 2019-11-28 RX ADMIN — KETOROLAC TROMETHAMINE 30 MG: 15 INJECTION, SOLUTION INTRAMUSCULAR; INTRAVENOUS at 02:51

## 2019-11-28 ASSESSMENT — PAIN DESCRIPTION - ONSET
ONSET: ON-GOING

## 2019-11-28 ASSESSMENT — PAIN SCALES - GENERAL
PAINLEVEL_OUTOF10: 10
PAINLEVEL_OUTOF10: 10
PAINLEVEL_OUTOF10: 6
PAINLEVEL_OUTOF10: 10
PAINLEVEL_OUTOF10: 9
PAINLEVEL_OUTOF10: 10
PAINLEVEL_OUTOF10: 8
PAINLEVEL_OUTOF10: 10
PAINLEVEL_OUTOF10: 6
PAINLEVEL_OUTOF10: 10
PAINLEVEL_OUTOF10: 10
PAINLEVEL_OUTOF10: 8
PAINLEVEL_OUTOF10: 8
PAINLEVEL_OUTOF10: 9

## 2019-11-28 ASSESSMENT — PAIN DESCRIPTION - LOCATION
LOCATION: NECK
LOCATION: HEAD;NECK
LOCATION: NECK

## 2019-11-28 ASSESSMENT — PAIN DESCRIPTION - PROGRESSION
CLINICAL_PROGRESSION: GRADUALLY IMPROVING
CLINICAL_PROGRESSION: GRADUALLY WORSENING
CLINICAL_PROGRESSION: GRADUALLY WORSENING

## 2019-11-28 ASSESSMENT — PAIN DESCRIPTION - ORIENTATION
ORIENTATION: LEFT;POSTERIOR

## 2019-11-28 ASSESSMENT — PAIN DESCRIPTION - PAIN TYPE
TYPE: ACUTE PAIN;CHRONIC PAIN

## 2019-11-28 ASSESSMENT — PAIN DESCRIPTION - DESCRIPTORS
DESCRIPTORS: ACHING;CONSTANT;DISCOMFORT

## 2019-11-29 ENCOUNTER — APPOINTMENT (OUTPATIENT)
Dept: GENERAL RADIOLOGY | Age: 54
DRG: 871 | End: 2019-11-29
Payer: COMMERCIAL

## 2019-11-29 PROBLEM — J81.0 ACUTE PULMONARY EDEMA (HCC): Status: ACTIVE | Noted: 2019-11-29

## 2019-11-29 PROBLEM — M46.20 PARASPINAL ABSCESS (HCC): Status: ACTIVE | Noted: 2019-11-29

## 2019-11-29 LAB
ABSOLUTE EOS #: 0.15 K/UL (ref 0–0.44)
ABSOLUTE IMMATURE GRANULOCYTE: 0.28 K/UL (ref 0–0.3)
ABSOLUTE LYMPH #: 0.96 K/UL (ref 1.1–3.7)
ABSOLUTE MONO #: 0.91 K/UL (ref 0.1–1.2)
ANION GAP SERPL CALCULATED.3IONS-SCNC: 9 MMOL/L (ref 9–17)
BASOPHILS # BLD: 1 % (ref 0–2)
BASOPHILS ABSOLUTE: 0.08 K/UL (ref 0–0.2)
BUN BLDV-MCNC: 11 MG/DL (ref 6–20)
BUN/CREAT BLD: ABNORMAL (ref 9–20)
CALCIUM SERPL-MCNC: 8.3 MG/DL (ref 8.6–10.4)
CHLORIDE BLD-SCNC: 104 MMOL/L (ref 98–107)
CO2: 23 MMOL/L (ref 20–31)
CREAT SERPL-MCNC: 0.63 MG/DL (ref 0.5–0.9)
CULTURE: ABNORMAL
CULTURE: ABNORMAL
DIFFERENTIAL TYPE: ABNORMAL
DIRECT EXAM: ABNORMAL
EOSINOPHILS RELATIVE PERCENT: 1 % (ref 1–4)
GFR AFRICAN AMERICAN: >60 ML/MIN
GFR NON-AFRICAN AMERICAN: >60 ML/MIN
GFR SERPL CREATININE-BSD FRML MDRD: ABNORMAL ML/MIN/{1.73_M2}
GFR SERPL CREATININE-BSD FRML MDRD: ABNORMAL ML/MIN/{1.73_M2}
GLUCOSE BLD-MCNC: 131 MG/DL (ref 65–105)
GLUCOSE BLD-MCNC: 136 MG/DL (ref 70–99)
GLUCOSE BLD-MCNC: 139 MG/DL (ref 65–105)
GLUCOSE BLD-MCNC: 141 MG/DL (ref 65–105)
GLUCOSE BLD-MCNC: 166 MG/DL (ref 65–105)
GLUCOSE BLD-MCNC: 173 MG/DL (ref 65–105)
HCT VFR BLD CALC: 25.9 % (ref 36.3–47.1)
HCT VFR BLD CALC: 27.2 % (ref 36.3–47.1)
HCT VFR BLD CALC: 28.1 % (ref 36.3–47.1)
HEMOGLOBIN: 8.3 G/DL (ref 11.9–15.1)
HEMOGLOBIN: 8.5 G/DL (ref 11.9–15.1)
HEMOGLOBIN: 8.8 G/DL (ref 11.9–15.1)
IMMATURE GRANULOCYTES: 2 %
INTERVENTION: NORMAL
LYMPHOCYTES # BLD: 7 % (ref 24–43)
Lab: ABNORMAL
MCH RBC QN AUTO: 28.9 PG (ref 25.2–33.5)
MCHC RBC AUTO-ENTMCNC: 31.3 G/DL (ref 28.4–34.8)
MCV RBC AUTO: 92.4 FL (ref 82.6–102.9)
MONOCYTES # BLD: 7 % (ref 3–12)
NRBC AUTOMATED: 0 PER 100 WBC
PDW BLD-RTO: 15.8 % (ref 11.8–14.4)
PLATELET # BLD: 477 K/UL (ref 138–453)
PLATELET ESTIMATE: ABNORMAL
PMV BLD AUTO: 9.5 FL (ref 8.1–13.5)
POTASSIUM SERPL-SCNC: 3.8 MMOL/L (ref 3.7–5.3)
RBC # BLD: 3.04 M/UL (ref 3.95–5.11)
RBC # BLD: ABNORMAL 10*6/UL
SEG NEUTROPHILS: 82 % (ref 36–65)
SEGMENTED NEUTROPHILS ABSOLUTE COUNT: 11.02 K/UL (ref 1.5–8.1)
SODIUM BLD-SCNC: 136 MMOL/L (ref 135–144)
SPECIMEN DESCRIPTION: ABNORMAL
WBC # BLD: 13.4 K/UL (ref 3.5–11.3)
WBC # BLD: ABNORMAL 10*3/UL

## 2019-11-29 PROCEDURE — 6370000000 HC RX 637 (ALT 250 FOR IP): Performed by: STUDENT IN AN ORGANIZED HEALTH CARE EDUCATION/TRAINING PROGRAM

## 2019-11-29 PROCEDURE — 6360000002 HC RX W HCPCS: Performed by: STUDENT IN AN ORGANIZED HEALTH CARE EDUCATION/TRAINING PROGRAM

## 2019-11-29 PROCEDURE — 85014 HEMATOCRIT: CPT

## 2019-11-29 PROCEDURE — 80048 BASIC METABOLIC PNL TOTAL CA: CPT

## 2019-11-29 PROCEDURE — 2580000003 HC RX 258: Performed by: INTERNAL MEDICINE

## 2019-11-29 PROCEDURE — 2060000000 HC ICU INTERMEDIATE R&B

## 2019-11-29 PROCEDURE — 6370000000 HC RX 637 (ALT 250 FOR IP): Performed by: INTERNAL MEDICINE

## 2019-11-29 PROCEDURE — 85018 HEMOGLOBIN: CPT

## 2019-11-29 PROCEDURE — 71045 X-RAY EXAM CHEST 1 VIEW: CPT

## 2019-11-29 PROCEDURE — 99232 SBSQ HOSP IP/OBS MODERATE 35: CPT | Performed by: INTERNAL MEDICINE

## 2019-11-29 PROCEDURE — 82947 ASSAY GLUCOSE BLOOD QUANT: CPT

## 2019-11-29 PROCEDURE — 85025 COMPLETE CBC W/AUTO DIFF WBC: CPT

## 2019-11-29 PROCEDURE — 6360000002 HC RX W HCPCS: Performed by: INTERNAL MEDICINE

## 2019-11-29 PROCEDURE — 36415 COLL VENOUS BLD VENIPUNCTURE: CPT

## 2019-11-29 RX ORDER — LISINOPRIL AND HYDROCHLOROTHIAZIDE 25; 20 MG/1; MG/1
1 TABLET ORAL DAILY
Status: DISCONTINUED | OUTPATIENT
Start: 2019-11-29 | End: 2019-12-05 | Stop reason: HOSPADM

## 2019-11-29 RX ADMIN — ENOXAPARIN SODIUM 40 MG: 40 INJECTION SUBCUTANEOUS at 08:56

## 2019-11-29 RX ADMIN — DAKIN'S SOLUTION 0.125% (QUARTER STRENGTH): 0.12 SOLUTION at 05:19

## 2019-11-29 RX ADMIN — DAKIN'S SOLUTION 0.125% (QUARTER STRENGTH) 473 ML: 0.12 SOLUTION at 13:43

## 2019-11-29 RX ADMIN — CEFTAROLINE FOSAMIL 600 MG: 600 POWDER, FOR SOLUTION INTRAVENOUS at 05:22

## 2019-11-29 RX ADMIN — INSULIN LISPRO 3 UNITS: 100 INJECTION, SOLUTION INTRAVENOUS; SUBCUTANEOUS at 17:00

## 2019-11-29 RX ADMIN — HYDROMORPHONE HYDROCHLORIDE 0.5 MG: 1 INJECTION, SOLUTION INTRAMUSCULAR; INTRAVENOUS; SUBCUTANEOUS at 03:47

## 2019-11-29 RX ADMIN — KETOROLAC TROMETHAMINE 30 MG: 15 INJECTION, SOLUTION INTRAMUSCULAR; INTRAVENOUS at 05:15

## 2019-11-29 RX ADMIN — HYDROMORPHONE HYDROCHLORIDE 0.5 MG: 1 INJECTION, SOLUTION INTRAMUSCULAR; INTRAVENOUS; SUBCUTANEOUS at 17:05

## 2019-11-29 RX ADMIN — Medication 10 ML: at 23:48

## 2019-11-29 RX ADMIN — INSULIN LISPRO 3 UNITS: 100 INJECTION, SOLUTION INTRAVENOUS; SUBCUTANEOUS at 08:32

## 2019-11-29 RX ADMIN — SODIUM CHLORIDE, PRESERVATIVE FREE 10 ML: 5 INJECTION INTRAVENOUS at 20:04

## 2019-11-29 RX ADMIN — HYDROMORPHONE HYDROCHLORIDE 0.5 MG: 1 INJECTION, SOLUTION INTRAMUSCULAR; INTRAVENOUS; SUBCUTANEOUS at 13:40

## 2019-11-29 RX ADMIN — TIZANIDINE 4 MG: 4 TABLET ORAL at 08:57

## 2019-11-29 RX ADMIN — Medication 81 MG: at 08:56

## 2019-11-29 RX ADMIN — FERROUS SULFATE TAB EC 325 MG (65 MG FE EQUIVALENT) 325 MG: 325 (65 FE) TABLET DELAYED RESPONSE at 08:56

## 2019-11-29 RX ADMIN — LISINOPRIL AND HYDROCHLOROTHIAZIDE 1 TABLET: 25; 20 TABLET ORAL at 12:11

## 2019-11-29 RX ADMIN — FAMOTIDINE 20 MG: 20 TABLET, FILM COATED ORAL at 08:56

## 2019-11-29 RX ADMIN — DEXTROSE MONOHYDRATE 100 MG: 50 INJECTION, SOLUTION INTRAVENOUS at 11:40

## 2019-11-29 RX ADMIN — KETOROLAC TROMETHAMINE 30 MG: 15 INJECTION, SOLUTION INTRAMUSCULAR; INTRAVENOUS at 23:43

## 2019-11-29 RX ADMIN — KETOROLAC TROMETHAMINE 30 MG: 15 INJECTION, SOLUTION INTRAMUSCULAR; INTRAVENOUS at 17:05

## 2019-11-29 RX ADMIN — Medication 10 ML: at 13:40

## 2019-11-29 RX ADMIN — HYDROMORPHONE HYDROCHLORIDE 0.5 MG: 1 INJECTION, SOLUTION INTRAMUSCULAR; INTRAVENOUS; SUBCUTANEOUS at 23:43

## 2019-11-29 RX ADMIN — INSULIN GLARGINE 20 UNITS: 100 INJECTION, SOLUTION SUBCUTANEOUS at 20:03

## 2019-11-29 RX ADMIN — CEFTAROLINE FOSAMIL 600 MG: 600 POWDER, FOR SOLUTION INTRAVENOUS at 17:59

## 2019-11-29 RX ADMIN — FAMOTIDINE 20 MG: 20 TABLET, FILM COATED ORAL at 20:03

## 2019-11-29 RX ADMIN — METOPROLOL TARTRATE 25 MG: 25 TABLET ORAL at 08:57

## 2019-11-29 RX ADMIN — METOPROLOL TARTRATE 25 MG: 25 TABLET ORAL at 20:03

## 2019-11-29 RX ADMIN — DESMOPRESSIN ACETATE 40 MG: 0.2 TABLET ORAL at 20:03

## 2019-11-29 RX ADMIN — HYDROMORPHONE HYDROCHLORIDE 0.5 MG: 1 INJECTION, SOLUTION INTRAMUSCULAR; INTRAVENOUS; SUBCUTANEOUS at 06:13

## 2019-11-29 RX ADMIN — GABAPENTIN 200 MG: 100 CAPSULE ORAL at 08:56

## 2019-11-29 RX ADMIN — HYDROMORPHONE HYDROCHLORIDE 0.5 MG: 1 INJECTION, SOLUTION INTRAMUSCULAR; INTRAVENOUS; SUBCUTANEOUS at 20:03

## 2019-11-29 ASSESSMENT — PAIN DESCRIPTION - PROGRESSION
CLINICAL_PROGRESSION: GRADUALLY WORSENING
CLINICAL_PROGRESSION: GRADUALLY WORSENING

## 2019-11-29 ASSESSMENT — PAIN DESCRIPTION - LOCATION
LOCATION: NECK;SHOULDER
LOCATION: NECK

## 2019-11-29 ASSESSMENT — PAIN SCALES - GENERAL
PAINLEVEL_OUTOF10: 10
PAINLEVEL_OUTOF10: 10
PAINLEVEL_OUTOF10: 9
PAINLEVEL_OUTOF10: 7
PAINLEVEL_OUTOF10: 4
PAINLEVEL_OUTOF10: 10
PAINLEVEL_OUTOF10: 6
PAINLEVEL_OUTOF10: 10
PAINLEVEL_OUTOF10: 9
PAINLEVEL_OUTOF10: 10
PAINLEVEL_OUTOF10: 6

## 2019-11-29 ASSESSMENT — PAIN DESCRIPTION - PAIN TYPE
TYPE: ACUTE PAIN;CHRONIC PAIN
TYPE: ACUTE PAIN;CHRONIC PAIN

## 2019-11-29 ASSESSMENT — ENCOUNTER SYMPTOMS
ABDOMINAL DISTENTION: 0
EYE DISCHARGE: 0
APNEA: 0
COLOR CHANGE: 0

## 2019-11-29 ASSESSMENT — PAIN DESCRIPTION - ORIENTATION
ORIENTATION: LEFT;POSTERIOR
ORIENTATION: RIGHT;LEFT;POSTERIOR

## 2019-11-29 ASSESSMENT — PAIN DESCRIPTION - ONSET
ONSET: PROGRESSIVE
ONSET: ON-GOING

## 2019-11-29 ASSESSMENT — PAIN DESCRIPTION - DESCRIPTORS
DESCRIPTORS: ACHING;CONSTANT;DISCOMFORT
DESCRIPTORS: ACHING;CONSTANT;DISCOMFORT

## 2019-11-30 LAB
ABO/RH: NORMAL
ABSOLUTE EOS #: 0.2 K/UL (ref 0–0.44)
ABSOLUTE IMMATURE GRANULOCYTE: 0.23 K/UL (ref 0–0.3)
ABSOLUTE LYMPH #: 1.42 K/UL (ref 1.1–3.7)
ABSOLUTE MONO #: 0.88 K/UL (ref 0.1–1.2)
ANION GAP SERPL CALCULATED.3IONS-SCNC: 10 MMOL/L (ref 9–17)
ANTIBODY SCREEN: NEGATIVE
ARM BAND NUMBER: NORMAL
BASOPHILS # BLD: 1 % (ref 0–2)
BASOPHILS ABSOLUTE: 0.08 K/UL (ref 0–0.2)
BLD PROD TYP BPU: NORMAL
BUN BLDV-MCNC: 12 MG/DL (ref 6–20)
BUN/CREAT BLD: ABNORMAL (ref 9–20)
CALCIUM SERPL-MCNC: 8.2 MG/DL (ref 8.6–10.4)
CHLORIDE BLD-SCNC: 106 MMOL/L (ref 98–107)
CO2: 21 MMOL/L (ref 20–31)
CREAT SERPL-MCNC: 0.65 MG/DL (ref 0.5–0.9)
CROSSMATCH RESULT: NORMAL
DIFFERENTIAL TYPE: ABNORMAL
DISPENSE STATUS BLOOD BANK: NORMAL
EOSINOPHILS RELATIVE PERCENT: 2 % (ref 1–4)
EXPIRATION DATE: NORMAL
GFR AFRICAN AMERICAN: >60 ML/MIN
GFR NON-AFRICAN AMERICAN: >60 ML/MIN
GFR SERPL CREATININE-BSD FRML MDRD: ABNORMAL ML/MIN/{1.73_M2}
GFR SERPL CREATININE-BSD FRML MDRD: ABNORMAL ML/MIN/{1.73_M2}
GLUCOSE BLD-MCNC: 112 MG/DL (ref 70–99)
GLUCOSE BLD-MCNC: 123 MG/DL (ref 65–105)
GLUCOSE BLD-MCNC: 182 MG/DL (ref 65–105)
GLUCOSE BLD-MCNC: 187 MG/DL (ref 65–105)
HCT VFR BLD CALC: 27.3 % (ref 36.3–47.1)
HEMOGLOBIN: 8.4 G/DL (ref 11.9–15.1)
IMMATURE GRANULOCYTES: 2 %
LV EF: 55 %
LVEF MODALITY: NORMAL
LYMPHOCYTES # BLD: 13 % (ref 24–43)
MCH RBC QN AUTO: 29.1 PG (ref 25.2–33.5)
MCHC RBC AUTO-ENTMCNC: 30.8 G/DL (ref 28.4–34.8)
MCV RBC AUTO: 94.5 FL (ref 82.6–102.9)
MONOCYTES # BLD: 8 % (ref 3–12)
NRBC AUTOMATED: 0 PER 100 WBC
PDW BLD-RTO: 15.6 % (ref 11.8–14.4)
PLATELET # BLD: 452 K/UL (ref 138–453)
PLATELET ESTIMATE: ABNORMAL
PMV BLD AUTO: 9.1 FL (ref 8.1–13.5)
POTASSIUM SERPL-SCNC: 3.8 MMOL/L (ref 3.7–5.3)
RBC # BLD: 2.89 M/UL (ref 3.95–5.11)
RBC # BLD: ABNORMAL 10*6/UL
SEG NEUTROPHILS: 74 % (ref 36–65)
SEGMENTED NEUTROPHILS ABSOLUTE COUNT: 8.4 K/UL (ref 1.5–8.1)
SODIUM BLD-SCNC: 137 MMOL/L (ref 135–144)
TRANSFUSION STATUS: NORMAL
UNIT DIVISION: 0
UNIT NUMBER: NORMAL
WBC # BLD: 11.2 K/UL (ref 3.5–11.3)
WBC # BLD: ABNORMAL 10*3/UL

## 2019-11-30 PROCEDURE — 2580000003 HC RX 258: Performed by: INTERNAL MEDICINE

## 2019-11-30 PROCEDURE — 99232 SBSQ HOSP IP/OBS MODERATE 35: CPT | Performed by: INTERNAL MEDICINE

## 2019-11-30 PROCEDURE — 6360000002 HC RX W HCPCS: Performed by: STUDENT IN AN ORGANIZED HEALTH CARE EDUCATION/TRAINING PROGRAM

## 2019-11-30 PROCEDURE — 6370000000 HC RX 637 (ALT 250 FOR IP): Performed by: STUDENT IN AN ORGANIZED HEALTH CARE EDUCATION/TRAINING PROGRAM

## 2019-11-30 PROCEDURE — 36415 COLL VENOUS BLD VENIPUNCTURE: CPT

## 2019-11-30 PROCEDURE — 2580000003 HC RX 258: Performed by: STUDENT IN AN ORGANIZED HEALTH CARE EDUCATION/TRAINING PROGRAM

## 2019-11-30 PROCEDURE — 6370000000 HC RX 637 (ALT 250 FOR IP): Performed by: INTERNAL MEDICINE

## 2019-11-30 PROCEDURE — 82947 ASSAY GLUCOSE BLOOD QUANT: CPT

## 2019-11-30 PROCEDURE — 6360000002 HC RX W HCPCS: Performed by: INTERNAL MEDICINE

## 2019-11-30 PROCEDURE — 93306 TTE W/DOPPLER COMPLETE: CPT

## 2019-11-30 PROCEDURE — 2060000000 HC ICU INTERMEDIATE R&B

## 2019-11-30 PROCEDURE — 80048 BASIC METABOLIC PNL TOTAL CA: CPT

## 2019-11-30 PROCEDURE — 85025 COMPLETE CBC W/AUTO DIFF WBC: CPT

## 2019-11-30 RX ORDER — CYCLOBENZAPRINE HCL 10 MG
10 TABLET ORAL 3 TIMES DAILY PRN
Status: DISCONTINUED | OUTPATIENT
Start: 2019-11-30 | End: 2019-12-05 | Stop reason: HOSPADM

## 2019-11-30 RX ADMIN — SODIUM CHLORIDE, PRESERVATIVE FREE 10 ML: 5 INJECTION INTRAVENOUS at 21:54

## 2019-11-30 RX ADMIN — SODIUM CHLORIDE, PRESERVATIVE FREE 10 ML: 5 INJECTION INTRAVENOUS at 08:22

## 2019-11-30 RX ADMIN — HYDROMORPHONE HYDROCHLORIDE 0.5 MG: 1 INJECTION, SOLUTION INTRAMUSCULAR; INTRAVENOUS; SUBCUTANEOUS at 08:18

## 2019-11-30 RX ADMIN — Medication 10 ML: at 05:35

## 2019-11-30 RX ADMIN — FAMOTIDINE 20 MG: 20 TABLET, FILM COATED ORAL at 21:53

## 2019-11-30 RX ADMIN — INSULIN LISPRO 3 UNITS: 100 INJECTION, SOLUTION INTRAVENOUS; SUBCUTANEOUS at 17:05

## 2019-11-30 RX ADMIN — CYCLOBENZAPRINE 10 MG: 10 TABLET, FILM COATED ORAL at 12:58

## 2019-11-30 RX ADMIN — DAKIN'S SOLUTION 0.125% (QUARTER STRENGTH) 473 ML: 0.12 SOLUTION at 05:59

## 2019-11-30 RX ADMIN — FERROUS SULFATE TAB EC 325 MG (65 MG FE EQUIVALENT) 325 MG: 325 (65 FE) TABLET DELAYED RESPONSE at 08:17

## 2019-11-30 RX ADMIN — CEFTAROLINE FOSAMIL 600 MG: 600 POWDER, FOR SOLUTION INTRAVENOUS at 05:36

## 2019-11-30 RX ADMIN — METOPROLOL TARTRATE 25 MG: 25 TABLET ORAL at 08:17

## 2019-11-30 RX ADMIN — HYDROMORPHONE HYDROCHLORIDE 0.5 MG: 1 INJECTION, SOLUTION INTRAMUSCULAR; INTRAVENOUS; SUBCUTANEOUS at 13:56

## 2019-11-30 RX ADMIN — LISINOPRIL AND HYDROCHLOROTHIAZIDE 1 TABLET: 25; 20 TABLET ORAL at 08:17

## 2019-11-30 RX ADMIN — TIZANIDINE 4 MG: 4 TABLET ORAL at 08:17

## 2019-11-30 RX ADMIN — HYDROMORPHONE HYDROCHLORIDE 0.5 MG: 1 INJECTION, SOLUTION INTRAMUSCULAR; INTRAVENOUS; SUBCUTANEOUS at 17:02

## 2019-11-30 RX ADMIN — CYCLOBENZAPRINE 10 MG: 10 TABLET, FILM COATED ORAL at 23:53

## 2019-11-30 RX ADMIN — Medication 81 MG: at 08:17

## 2019-11-30 RX ADMIN — SODIUM CHLORIDE, PRESERVATIVE FREE 10 ML: 5 INJECTION INTRAVENOUS at 09:43

## 2019-11-30 RX ADMIN — HYDROMORPHONE HYDROCHLORIDE 0.5 MG: 1 INJECTION, SOLUTION INTRAMUSCULAR; INTRAVENOUS; SUBCUTANEOUS at 11:14

## 2019-11-30 RX ADMIN — METOPROLOL TARTRATE 25 MG: 25 TABLET ORAL at 21:53

## 2019-11-30 RX ADMIN — HYDROMORPHONE HYDROCHLORIDE 0.5 MG: 1 INJECTION, SOLUTION INTRAMUSCULAR; INTRAVENOUS; SUBCUTANEOUS at 21:53

## 2019-11-30 RX ADMIN — HYDROMORPHONE HYDROCHLORIDE 0.5 MG: 1 INJECTION, SOLUTION INTRAMUSCULAR; INTRAVENOUS; SUBCUTANEOUS at 19:27

## 2019-11-30 RX ADMIN — HYDROMORPHONE HYDROCHLORIDE 0.5 MG: 1 INJECTION, SOLUTION INTRAMUSCULAR; INTRAVENOUS; SUBCUTANEOUS at 05:35

## 2019-11-30 RX ADMIN — DESMOPRESSIN ACETATE 40 MG: 0.2 TABLET ORAL at 21:53

## 2019-11-30 RX ADMIN — ENOXAPARIN SODIUM 40 MG: 40 INJECTION SUBCUTANEOUS at 08:16

## 2019-11-30 RX ADMIN — FAMOTIDINE 20 MG: 20 TABLET, FILM COATED ORAL at 08:17

## 2019-11-30 RX ADMIN — INSULIN GLARGINE 20 UNITS: 100 INJECTION, SOLUTION SUBCUTANEOUS at 22:02

## 2019-11-30 RX ADMIN — DEXTROSE MONOHYDRATE 100 MG: 50 INJECTION, SOLUTION INTRAVENOUS at 12:58

## 2019-11-30 RX ADMIN — INSULIN LISPRO 2 UNITS: 100 INJECTION, SOLUTION INTRAVENOUS; SUBCUTANEOUS at 22:02

## 2019-11-30 RX ADMIN — SODIUM CHLORIDE, PRESERVATIVE FREE 10 ML: 5 INJECTION INTRAVENOUS at 08:23

## 2019-11-30 ASSESSMENT — PAIN SCALES - GENERAL
PAINLEVEL_OUTOF10: 10
PAINLEVEL_OUTOF10: 7
PAINLEVEL_OUTOF10: 5
PAINLEVEL_OUTOF10: 10
PAINLEVEL_OUTOF10: 7
PAINLEVEL_OUTOF10: 10

## 2019-12-01 LAB
ABSOLUTE EOS #: 0.17 K/UL (ref 0–0.44)
ABSOLUTE IMMATURE GRANULOCYTE: 0.18 K/UL (ref 0–0.3)
ABSOLUTE LYMPH #: 1.41 K/UL (ref 1.1–3.7)
ABSOLUTE MONO #: 1.08 K/UL (ref 0.1–1.2)
ANION GAP SERPL CALCULATED.3IONS-SCNC: 10 MMOL/L (ref 9–17)
BASOPHILS # BLD: 1 % (ref 0–2)
BASOPHILS ABSOLUTE: 0.08 K/UL (ref 0–0.2)
BUN BLDV-MCNC: 10 MG/DL (ref 6–20)
BUN/CREAT BLD: ABNORMAL (ref 9–20)
CALCIUM SERPL-MCNC: 8.5 MG/DL (ref 8.6–10.4)
CHLORIDE BLD-SCNC: 99 MMOL/L (ref 98–107)
CO2: 25 MMOL/L (ref 20–31)
CREAT SERPL-MCNC: 0.65 MG/DL (ref 0.5–0.9)
DIFFERENTIAL TYPE: ABNORMAL
EOSINOPHILS RELATIVE PERCENT: 1 % (ref 1–4)
GFR AFRICAN AMERICAN: >60 ML/MIN
GFR NON-AFRICAN AMERICAN: >60 ML/MIN
GFR SERPL CREATININE-BSD FRML MDRD: ABNORMAL ML/MIN/{1.73_M2}
GFR SERPL CREATININE-BSD FRML MDRD: ABNORMAL ML/MIN/{1.73_M2}
GLUCOSE BLD-MCNC: 110 MG/DL (ref 65–105)
GLUCOSE BLD-MCNC: 134 MG/DL (ref 65–105)
GLUCOSE BLD-MCNC: 139 MG/DL (ref 70–99)
GLUCOSE BLD-MCNC: 162 MG/DL (ref 65–105)
GLUCOSE BLD-MCNC: 83 MG/DL (ref 65–105)
HCT VFR BLD CALC: 27 % (ref 36.3–47.1)
HEMOGLOBIN: 8.4 G/DL (ref 11.9–15.1)
IMMATURE GRANULOCYTES: 1 %
LYMPHOCYTES # BLD: 11 % (ref 24–43)
MCH RBC QN AUTO: 29.1 PG (ref 25.2–33.5)
MCHC RBC AUTO-ENTMCNC: 31.1 G/DL (ref 28.4–34.8)
MCV RBC AUTO: 93.4 FL (ref 82.6–102.9)
MONOCYTES # BLD: 9 % (ref 3–12)
NRBC AUTOMATED: 0 PER 100 WBC
PDW BLD-RTO: 15.4 % (ref 11.8–14.4)
PLATELET # BLD: 510 K/UL (ref 138–453)
PLATELET ESTIMATE: ABNORMAL
PMV BLD AUTO: 9.4 FL (ref 8.1–13.5)
POTASSIUM SERPL-SCNC: 3.8 MMOL/L (ref 3.7–5.3)
RBC # BLD: 2.89 M/UL (ref 3.95–5.11)
RBC # BLD: ABNORMAL 10*6/UL
SEG NEUTROPHILS: 77 % (ref 36–65)
SEGMENTED NEUTROPHILS ABSOLUTE COUNT: 9.77 K/UL (ref 1.5–8.1)
SODIUM BLD-SCNC: 134 MMOL/L (ref 135–144)
WBC # BLD: 12.7 K/UL (ref 3.5–11.3)
WBC # BLD: ABNORMAL 10*3/UL

## 2019-12-01 PROCEDURE — 6370000000 HC RX 637 (ALT 250 FOR IP): Performed by: STUDENT IN AN ORGANIZED HEALTH CARE EDUCATION/TRAINING PROGRAM

## 2019-12-01 PROCEDURE — 82947 ASSAY GLUCOSE BLOOD QUANT: CPT

## 2019-12-01 PROCEDURE — 99232 SBSQ HOSP IP/OBS MODERATE 35: CPT | Performed by: INTERNAL MEDICINE

## 2019-12-01 PROCEDURE — 2580000003 HC RX 258: Performed by: STUDENT IN AN ORGANIZED HEALTH CARE EDUCATION/TRAINING PROGRAM

## 2019-12-01 PROCEDURE — 6360000002 HC RX W HCPCS: Performed by: STUDENT IN AN ORGANIZED HEALTH CARE EDUCATION/TRAINING PROGRAM

## 2019-12-01 PROCEDURE — 80048 BASIC METABOLIC PNL TOTAL CA: CPT

## 2019-12-01 PROCEDURE — 36415 COLL VENOUS BLD VENIPUNCTURE: CPT

## 2019-12-01 PROCEDURE — 6360000002 HC RX W HCPCS: Performed by: INTERNAL MEDICINE

## 2019-12-01 PROCEDURE — 2580000003 HC RX 258: Performed by: INTERNAL MEDICINE

## 2019-12-01 PROCEDURE — 2060000000 HC ICU INTERMEDIATE R&B

## 2019-12-01 PROCEDURE — 85025 COMPLETE CBC W/AUTO DIFF WBC: CPT

## 2019-12-01 RX ADMIN — HYDROMORPHONE HYDROCHLORIDE 0.5 MG: 1 INJECTION, SOLUTION INTRAMUSCULAR; INTRAVENOUS; SUBCUTANEOUS at 04:51

## 2019-12-01 RX ADMIN — HYDROMORPHONE HYDROCHLORIDE 0.5 MG: 1 INJECTION, SOLUTION INTRAMUSCULAR; INTRAVENOUS; SUBCUTANEOUS at 13:45

## 2019-12-01 RX ADMIN — FAMOTIDINE 20 MG: 20 TABLET, FILM COATED ORAL at 22:44

## 2019-12-01 RX ADMIN — SODIUM CHLORIDE, PRESERVATIVE FREE 10 ML: 5 INJECTION INTRAVENOUS at 08:06

## 2019-12-01 RX ADMIN — FERROUS SULFATE TAB EC 325 MG (65 MG FE EQUIVALENT) 325 MG: 325 (65 FE) TABLET DELAYED RESPONSE at 08:04

## 2019-12-01 RX ADMIN — HYDROMORPHONE HYDROCHLORIDE 0.5 MG: 1 INJECTION, SOLUTION INTRAMUSCULAR; INTRAVENOUS; SUBCUTANEOUS at 17:04

## 2019-12-01 RX ADMIN — HYDROMORPHONE HYDROCHLORIDE 0.5 MG: 1 INJECTION, SOLUTION INTRAMUSCULAR; INTRAVENOUS; SUBCUTANEOUS at 11:59

## 2019-12-01 RX ADMIN — HYDROMORPHONE HYDROCHLORIDE 0.5 MG: 1 INJECTION, SOLUTION INTRAMUSCULAR; INTRAVENOUS; SUBCUTANEOUS at 02:09

## 2019-12-01 RX ADMIN — INSULIN GLARGINE 20 UNITS: 100 INJECTION, SOLUTION SUBCUTANEOUS at 22:50

## 2019-12-01 RX ADMIN — SODIUM CHLORIDE, PRESERVATIVE FREE 10 ML: 5 INJECTION INTRAVENOUS at 22:50

## 2019-12-01 RX ADMIN — HYDROMORPHONE HYDROCHLORIDE 0.5 MG: 1 INJECTION, SOLUTION INTRAMUSCULAR; INTRAVENOUS; SUBCUTANEOUS at 07:59

## 2019-12-01 RX ADMIN — HYDROMORPHONE HYDROCHLORIDE 0.5 MG: 1 INJECTION, SOLUTION INTRAMUSCULAR; INTRAVENOUS; SUBCUTANEOUS at 19:29

## 2019-12-01 RX ADMIN — METOPROLOL TARTRATE 25 MG: 25 TABLET ORAL at 08:04

## 2019-12-01 RX ADMIN — INSULIN LISPRO 2 UNITS: 100 INJECTION, SOLUTION INTRAVENOUS; SUBCUTANEOUS at 22:50

## 2019-12-01 RX ADMIN — Medication 10 ML: at 22:41

## 2019-12-01 RX ADMIN — FAMOTIDINE 20 MG: 20 TABLET, FILM COATED ORAL at 08:04

## 2019-12-01 RX ADMIN — HYDROMORPHONE HYDROCHLORIDE 0.5 MG: 1 INJECTION, SOLUTION INTRAMUSCULAR; INTRAVENOUS; SUBCUTANEOUS at 22:40

## 2019-12-01 RX ADMIN — Medication 81 MG: at 08:04

## 2019-12-01 RX ADMIN — Medication 2 G: at 22:50

## 2019-12-01 RX ADMIN — CYCLOBENZAPRINE 10 MG: 10 TABLET, FILM COATED ORAL at 08:03

## 2019-12-01 RX ADMIN — Medication 2 G: at 09:34

## 2019-12-01 RX ADMIN — HYDROMORPHONE HYDROCHLORIDE 0.5 MG: 1 INJECTION, SOLUTION INTRAMUSCULAR; INTRAVENOUS; SUBCUTANEOUS at 09:59

## 2019-12-01 RX ADMIN — ENOXAPARIN SODIUM 40 MG: 40 INJECTION SUBCUTANEOUS at 08:03

## 2019-12-01 RX ADMIN — Medication 2 G: at 16:49

## 2019-12-01 RX ADMIN — DEXTROSE MONOHYDRATE 100 MG: 50 INJECTION, SOLUTION INTRAVENOUS at 12:58

## 2019-12-01 RX ADMIN — LISINOPRIL AND HYDROCHLOROTHIAZIDE 1 TABLET: 25; 20 TABLET ORAL at 08:04

## 2019-12-01 RX ADMIN — DESMOPRESSIN ACETATE 40 MG: 0.2 TABLET ORAL at 22:45

## 2019-12-01 ASSESSMENT — PAIN SCALES - GENERAL
PAINLEVEL_OUTOF10: 10
PAINLEVEL_OUTOF10: 7
PAINLEVEL_OUTOF10: 10
PAINLEVEL_OUTOF10: 7
PAINLEVEL_OUTOF10: 10
PAINLEVEL_OUTOF10: 10
PAINLEVEL_OUTOF10: 6

## 2019-12-01 ASSESSMENT — PAIN DESCRIPTION - LOCATION: LOCATION: NECK

## 2019-12-02 LAB
ABSOLUTE EOS #: 0.16 K/UL (ref 0–0.44)
ABSOLUTE IMMATURE GRANULOCYTE: 0.13 K/UL (ref 0–0.3)
ABSOLUTE LYMPH #: 1.1 K/UL (ref 1.1–3.7)
ABSOLUTE MONO #: 1.1 K/UL (ref 0.1–1.2)
ANION GAP SERPL CALCULATED.3IONS-SCNC: 8 MMOL/L (ref 9–17)
BASOPHILS # BLD: 1 % (ref 0–2)
BASOPHILS ABSOLUTE: 0.07 K/UL (ref 0–0.2)
BUN BLDV-MCNC: 10 MG/DL (ref 6–20)
BUN/CREAT BLD: ABNORMAL (ref 9–20)
CALCIUM SERPL-MCNC: 8.7 MG/DL (ref 8.6–10.4)
CHLORIDE BLD-SCNC: 99 MMOL/L (ref 98–107)
CO2: 29 MMOL/L (ref 20–31)
CREAT SERPL-MCNC: 0.7 MG/DL (ref 0.5–0.9)
DIFFERENTIAL TYPE: ABNORMAL
EOSINOPHILS RELATIVE PERCENT: 1 % (ref 1–4)
GFR AFRICAN AMERICAN: >60 ML/MIN
GFR NON-AFRICAN AMERICAN: >60 ML/MIN
GFR SERPL CREATININE-BSD FRML MDRD: ABNORMAL ML/MIN/{1.73_M2}
GFR SERPL CREATININE-BSD FRML MDRD: ABNORMAL ML/MIN/{1.73_M2}
GLUCOSE BLD-MCNC: 114 MG/DL (ref 65–105)
GLUCOSE BLD-MCNC: 117 MG/DL (ref 70–99)
GLUCOSE BLD-MCNC: 118 MG/DL (ref 65–105)
GLUCOSE BLD-MCNC: 207 MG/DL (ref 65–105)
GLUCOSE BLD-MCNC: 237 MG/DL (ref 65–105)
HCT VFR BLD CALC: 28.7 % (ref 36.3–47.1)
HEMOGLOBIN: 8.9 G/DL (ref 11.9–15.1)
IMMATURE GRANULOCYTES: 1 %
LYMPHOCYTES # BLD: 10 % (ref 24–43)
MCH RBC QN AUTO: 28.7 PG (ref 25.2–33.5)
MCHC RBC AUTO-ENTMCNC: 31 G/DL (ref 28.4–34.8)
MCV RBC AUTO: 92.6 FL (ref 82.6–102.9)
MONOCYTES # BLD: 10 % (ref 3–12)
NRBC AUTOMATED: 0 PER 100 WBC
PDW BLD-RTO: 15.1 % (ref 11.8–14.4)
PLATELET # BLD: 505 K/UL (ref 138–453)
PLATELET ESTIMATE: ABNORMAL
PMV BLD AUTO: 9.2 FL (ref 8.1–13.5)
POTASSIUM SERPL-SCNC: 3.7 MMOL/L (ref 3.7–5.3)
RBC # BLD: 3.1 M/UL (ref 3.95–5.11)
RBC # BLD: ABNORMAL 10*6/UL
SEG NEUTROPHILS: 77 % (ref 36–65)
SEGMENTED NEUTROPHILS ABSOLUTE COUNT: 8.71 K/UL (ref 1.5–8.1)
SODIUM BLD-SCNC: 136 MMOL/L (ref 135–144)
WBC # BLD: 11.3 K/UL (ref 3.5–11.3)
WBC # BLD: ABNORMAL 10*3/UL

## 2019-12-02 PROCEDURE — 76937 US GUIDE VASCULAR ACCESS: CPT

## 2019-12-02 PROCEDURE — 2580000003 HC RX 258: Performed by: STUDENT IN AN ORGANIZED HEALTH CARE EDUCATION/TRAINING PROGRAM

## 2019-12-02 PROCEDURE — 6370000000 HC RX 637 (ALT 250 FOR IP): Performed by: STUDENT IN AN ORGANIZED HEALTH CARE EDUCATION/TRAINING PROGRAM

## 2019-12-02 PROCEDURE — 6360000002 HC RX W HCPCS: Performed by: STUDENT IN AN ORGANIZED HEALTH CARE EDUCATION/TRAINING PROGRAM

## 2019-12-02 PROCEDURE — 82947 ASSAY GLUCOSE BLOOD QUANT: CPT

## 2019-12-02 PROCEDURE — 2060000000 HC ICU INTERMEDIATE R&B

## 2019-12-02 PROCEDURE — 99239 HOSP IP/OBS DSCHRG MGMT >30: CPT | Performed by: INTERNAL MEDICINE

## 2019-12-02 PROCEDURE — 99232 SBSQ HOSP IP/OBS MODERATE 35: CPT | Performed by: INTERNAL MEDICINE

## 2019-12-02 PROCEDURE — 85025 COMPLETE CBC W/AUTO DIFF WBC: CPT

## 2019-12-02 PROCEDURE — 6370000000 HC RX 637 (ALT 250 FOR IP): Performed by: INTERNAL MEDICINE

## 2019-12-02 PROCEDURE — C1751 CATH, INF, PER/CENT/MIDLINE: HCPCS

## 2019-12-02 PROCEDURE — 80048 BASIC METABOLIC PNL TOTAL CA: CPT

## 2019-12-02 PROCEDURE — 36415 COLL VENOUS BLD VENIPUNCTURE: CPT

## 2019-12-02 RX ORDER — GABAPENTIN 300 MG/1
300 CAPSULE ORAL 3 TIMES DAILY
Status: DISCONTINUED | OUTPATIENT
Start: 2019-12-02 | End: 2019-12-05 | Stop reason: HOSPADM

## 2019-12-02 RX ORDER — OXYCODONE HYDROCHLORIDE AND ACETAMINOPHEN 5; 325 MG/1; MG/1
1 TABLET ORAL EVERY 6 HOURS PRN
Status: DISCONTINUED | OUTPATIENT
Start: 2019-12-02 | End: 2019-12-05 | Stop reason: HOSPADM

## 2019-12-02 RX ORDER — TRAZODONE HYDROCHLORIDE 50 MG/1
50 TABLET ORAL NIGHTLY PRN
Status: DISCONTINUED | OUTPATIENT
Start: 2019-12-02 | End: 2019-12-05 | Stop reason: HOSPADM

## 2019-12-02 RX ADMIN — FAMOTIDINE 20 MG: 20 TABLET, FILM COATED ORAL at 08:14

## 2019-12-02 RX ADMIN — CYCLOBENZAPRINE 10 MG: 10 TABLET, FILM COATED ORAL at 21:09

## 2019-12-02 RX ADMIN — OXYCODONE HYDROCHLORIDE AND ACETAMINOPHEN 1 TABLET: 5; 325 TABLET ORAL at 16:58

## 2019-12-02 RX ADMIN — Medication 81 MG: at 08:14

## 2019-12-02 RX ADMIN — OXYCODONE HYDROCHLORIDE AND ACETAMINOPHEN 1 TABLET: 5; 325 TABLET ORAL at 08:59

## 2019-12-02 RX ADMIN — DAKIN'S SOLUTION 0.125% (QUARTER STRENGTH) 473 ML: 0.12 SOLUTION at 09:52

## 2019-12-02 RX ADMIN — METOPROLOL TARTRATE 25 MG: 25 TABLET ORAL at 21:09

## 2019-12-02 RX ADMIN — OXYCODONE HYDROCHLORIDE AND ACETAMINOPHEN 1 TABLET: 5; 325 TABLET ORAL at 23:08

## 2019-12-02 RX ADMIN — FERROUS SULFATE TAB EC 325 MG (65 MG FE EQUIVALENT) 325 MG: 325 (65 FE) TABLET DELAYED RESPONSE at 08:15

## 2019-12-02 RX ADMIN — Medication 2 G: at 08:07

## 2019-12-02 RX ADMIN — ENOXAPARIN SODIUM 40 MG: 40 INJECTION SUBCUTANEOUS at 08:14

## 2019-12-02 RX ADMIN — SODIUM CHLORIDE, PRESERVATIVE FREE 10 ML: 5 INJECTION INTRAVENOUS at 21:10

## 2019-12-02 RX ADMIN — FAMOTIDINE 20 MG: 20 TABLET, FILM COATED ORAL at 21:09

## 2019-12-02 RX ADMIN — HYDROMORPHONE HYDROCHLORIDE 0.5 MG: 1 INJECTION, SOLUTION INTRAMUSCULAR; INTRAVENOUS; SUBCUTANEOUS at 04:15

## 2019-12-02 RX ADMIN — Medication 2 G: at 23:57

## 2019-12-02 RX ADMIN — TRAZODONE HYDROCHLORIDE 50 MG: 50 TABLET ORAL at 21:10

## 2019-12-02 RX ADMIN — INSULIN GLARGINE 20 UNITS: 100 INJECTION, SOLUTION SUBCUTANEOUS at 21:22

## 2019-12-02 RX ADMIN — CYCLOBENZAPRINE 10 MG: 10 TABLET, FILM COATED ORAL at 08:14

## 2019-12-02 RX ADMIN — INSULIN LISPRO 3 UNITS: 100 INJECTION, SOLUTION INTRAVENOUS; SUBCUTANEOUS at 21:10

## 2019-12-02 RX ADMIN — INSULIN LISPRO 6 UNITS: 100 INJECTION, SOLUTION INTRAVENOUS; SUBCUTANEOUS at 17:50

## 2019-12-02 RX ADMIN — DAKIN'S SOLUTION 0.125% (QUARTER STRENGTH): 0.12 SOLUTION at 09:52

## 2019-12-02 RX ADMIN — Medication 2 G: at 16:55

## 2019-12-02 RX ADMIN — LISINOPRIL AND HYDROCHLOROTHIAZIDE 1 TABLET: 25; 20 TABLET ORAL at 08:15

## 2019-12-02 RX ADMIN — DESMOPRESSIN ACETATE 40 MG: 0.2 TABLET ORAL at 21:10

## 2019-12-02 RX ADMIN — METOPROLOL TARTRATE 25 MG: 25 TABLET ORAL at 08:14

## 2019-12-02 ASSESSMENT — PAIN SCALES - GENERAL
PAINLEVEL_OUTOF10: 6
PAINLEVEL_OUTOF10: 8
PAINLEVEL_OUTOF10: 10
PAINLEVEL_OUTOF10: 10
PAINLEVEL_OUTOF10: 9

## 2019-12-03 LAB
ABSOLUTE EOS #: 0.17 K/UL (ref 0–0.44)
ABSOLUTE IMMATURE GRANULOCYTE: 0.08 K/UL (ref 0–0.3)
ABSOLUTE LYMPH #: 1.66 K/UL (ref 1.1–3.7)
ABSOLUTE MONO #: 1.1 K/UL (ref 0.1–1.2)
ANION GAP SERPL CALCULATED.3IONS-SCNC: 9 MMOL/L (ref 9–17)
BASOPHILS # BLD: 1 % (ref 0–2)
BASOPHILS ABSOLUTE: 0.07 K/UL (ref 0–0.2)
BUN BLDV-MCNC: 9 MG/DL (ref 6–20)
BUN/CREAT BLD: ABNORMAL (ref 9–20)
CALCIUM SERPL-MCNC: 8.6 MG/DL (ref 8.6–10.4)
CHLORIDE BLD-SCNC: 98 MMOL/L (ref 98–107)
CO2: 30 MMOL/L (ref 20–31)
CREAT SERPL-MCNC: 0.75 MG/DL (ref 0.5–0.9)
DIFFERENTIAL TYPE: ABNORMAL
EOSINOPHILS RELATIVE PERCENT: 2 % (ref 1–4)
GFR AFRICAN AMERICAN: >60 ML/MIN
GFR NON-AFRICAN AMERICAN: >60 ML/MIN
GFR SERPL CREATININE-BSD FRML MDRD: ABNORMAL ML/MIN/{1.73_M2}
GFR SERPL CREATININE-BSD FRML MDRD: ABNORMAL ML/MIN/{1.73_M2}
GLUCOSE BLD-MCNC: 135 MG/DL (ref 65–105)
GLUCOSE BLD-MCNC: 152 MG/DL (ref 70–99)
GLUCOSE BLD-MCNC: 173 MG/DL (ref 65–105)
GLUCOSE BLD-MCNC: 238 MG/DL (ref 65–105)
GLUCOSE BLD-MCNC: 240 MG/DL (ref 65–105)
HCT VFR BLD CALC: 29.4 % (ref 36.3–47.1)
HEMOGLOBIN: 9 G/DL (ref 11.9–15.1)
IMMATURE GRANULOCYTES: 1 %
LYMPHOCYTES # BLD: 18 % (ref 24–43)
MAGNESIUM: 1.8 MG/DL (ref 1.6–2.6)
MCH RBC QN AUTO: 28.2 PG (ref 25.2–33.5)
MCHC RBC AUTO-ENTMCNC: 30.6 G/DL (ref 28.4–34.8)
MCV RBC AUTO: 92.2 FL (ref 82.6–102.9)
MONOCYTES # BLD: 12 % (ref 3–12)
NRBC AUTOMATED: 0 PER 100 WBC
PDW BLD-RTO: 14.7 % (ref 11.8–14.4)
PLATELET # BLD: 574 K/UL (ref 138–453)
PLATELET ESTIMATE: ABNORMAL
PMV BLD AUTO: 9.1 FL (ref 8.1–13.5)
POTASSIUM SERPL-SCNC: 3.4 MMOL/L (ref 3.7–5.3)
RBC # BLD: 3.19 M/UL (ref 3.95–5.11)
RBC # BLD: ABNORMAL 10*6/UL
SEG NEUTROPHILS: 66 % (ref 36–65)
SEGMENTED NEUTROPHILS ABSOLUTE COUNT: 6.41 K/UL (ref 1.5–8.1)
SODIUM BLD-SCNC: 137 MMOL/L (ref 135–144)
WBC # BLD: 9.5 K/UL (ref 3.5–11.3)
WBC # BLD: ABNORMAL 10*3/UL

## 2019-12-03 PROCEDURE — 6370000000 HC RX 637 (ALT 250 FOR IP): Performed by: STUDENT IN AN ORGANIZED HEALTH CARE EDUCATION/TRAINING PROGRAM

## 2019-12-03 PROCEDURE — 2580000003 HC RX 258: Performed by: STUDENT IN AN ORGANIZED HEALTH CARE EDUCATION/TRAINING PROGRAM

## 2019-12-03 PROCEDURE — 82947 ASSAY GLUCOSE BLOOD QUANT: CPT

## 2019-12-03 PROCEDURE — 85025 COMPLETE CBC W/AUTO DIFF WBC: CPT

## 2019-12-03 PROCEDURE — 2060000000 HC ICU INTERMEDIATE R&B

## 2019-12-03 PROCEDURE — 99232 SBSQ HOSP IP/OBS MODERATE 35: CPT | Performed by: INTERNAL MEDICINE

## 2019-12-03 PROCEDURE — 6360000002 HC RX W HCPCS: Performed by: STUDENT IN AN ORGANIZED HEALTH CARE EDUCATION/TRAINING PROGRAM

## 2019-12-03 PROCEDURE — 83735 ASSAY OF MAGNESIUM: CPT

## 2019-12-03 PROCEDURE — 99233 SBSQ HOSP IP/OBS HIGH 50: CPT | Performed by: INTERNAL MEDICINE

## 2019-12-03 PROCEDURE — 80048 BASIC METABOLIC PNL TOTAL CA: CPT

## 2019-12-03 PROCEDURE — 36415 COLL VENOUS BLD VENIPUNCTURE: CPT

## 2019-12-03 PROCEDURE — 6370000000 HC RX 637 (ALT 250 FOR IP): Performed by: INTERNAL MEDICINE

## 2019-12-03 RX ORDER — METOPROLOL SUCCINATE 25 MG/1
25 TABLET, EXTENDED RELEASE ORAL DAILY
Qty: 30 TABLET | Refills: 2 | Status: ON HOLD | OUTPATIENT
Start: 2019-12-03 | End: 2019-12-07 | Stop reason: HOSPADM

## 2019-12-03 RX ORDER — OXYCODONE HYDROCHLORIDE AND ACETAMINOPHEN 5; 325 MG/1; MG/1
1 TABLET ORAL EVERY 6 HOURS PRN
Qty: 20 TABLET | Refills: 0 | Status: SHIPPED | OUTPATIENT
Start: 2019-12-03 | End: 2019-12-08

## 2019-12-03 RX ORDER — SODIUM HYPOCHLORITE 1.25 MG/ML
SOLUTION TOPICAL EVERY 8 HOURS SCHEDULED
Qty: 1 BOTTLE | Refills: 3 | Status: SHIPPED | OUTPATIENT
Start: 2019-12-03 | End: 2019-12-03

## 2019-12-03 RX ORDER — LANOLIN ALCOHOL/MO/W.PET/CERES
325 CREAM (GRAM) TOPICAL
Qty: 90 TABLET | Refills: 3 | Status: SHIPPED | OUTPATIENT
Start: 2019-12-04 | End: 2020-11-20

## 2019-12-03 RX ORDER — SODIUM HYPOCHLORITE 1.25 MG/ML
SOLUTION TOPICAL DAILY
Qty: 1 BOTTLE | Refills: 2 | Status: SHIPPED | OUTPATIENT
Start: 2019-12-04 | End: 2019-12-03 | Stop reason: HOSPADM

## 2019-12-03 RX ORDER — POTASSIUM CHLORIDE 20 MEQ/1
40 TABLET, EXTENDED RELEASE ORAL ONCE
Status: COMPLETED | OUTPATIENT
Start: 2019-12-03 | End: 2019-12-03

## 2019-12-03 RX ORDER — SODIUM HYPOCHLORITE 1.25 MG/ML
SOLUTION TOPICAL EVERY 8 HOURS SCHEDULED
Qty: 2 BOTTLE | Refills: 3 | Status: ON HOLD | OUTPATIENT
Start: 2019-12-03 | End: 2019-12-07 | Stop reason: HOSPADM

## 2019-12-03 RX ADMIN — FAMOTIDINE 20 MG: 20 TABLET, FILM COATED ORAL at 08:11

## 2019-12-03 RX ADMIN — METOPROLOL TARTRATE 25 MG: 25 TABLET ORAL at 11:47

## 2019-12-03 RX ADMIN — TRAZODONE HYDROCHLORIDE 50 MG: 50 TABLET ORAL at 20:58

## 2019-12-03 RX ADMIN — OXYCODONE HYDROCHLORIDE AND ACETAMINOPHEN 1 TABLET: 5; 325 TABLET ORAL at 14:10

## 2019-12-03 RX ADMIN — Medication 2 G: at 15:43

## 2019-12-03 RX ADMIN — CYCLOBENZAPRINE 10 MG: 10 TABLET, FILM COATED ORAL at 08:11

## 2019-12-03 RX ADMIN — LISINOPRIL AND HYDROCHLOROTHIAZIDE 1 TABLET: 25; 20 TABLET ORAL at 08:11

## 2019-12-03 RX ADMIN — ENOXAPARIN SODIUM 40 MG: 40 INJECTION SUBCUTANEOUS at 08:11

## 2019-12-03 RX ADMIN — FAMOTIDINE 20 MG: 20 TABLET, FILM COATED ORAL at 20:59

## 2019-12-03 RX ADMIN — FERROUS SULFATE TAB EC 325 MG (65 MG FE EQUIVALENT) 325 MG: 325 (65 FE) TABLET DELAYED RESPONSE at 08:11

## 2019-12-03 RX ADMIN — SODIUM CHLORIDE, PRESERVATIVE FREE 10 ML: 5 INJECTION INTRAVENOUS at 20:59

## 2019-12-03 RX ADMIN — METOPROLOL TARTRATE 25 MG: 25 TABLET ORAL at 20:58

## 2019-12-03 RX ADMIN — DAKIN'S SOLUTION 0.125% (QUARTER STRENGTH) 946 ML: 0.12 SOLUTION at 13:18

## 2019-12-03 RX ADMIN — OXYCODONE HYDROCHLORIDE AND ACETAMINOPHEN 1 TABLET: 5; 325 TABLET ORAL at 08:10

## 2019-12-03 RX ADMIN — INSULIN LISPRO 6 UNITS: 100 INJECTION, SOLUTION INTRAVENOUS; SUBCUTANEOUS at 17:48

## 2019-12-03 RX ADMIN — INSULIN LISPRO 3 UNITS: 100 INJECTION, SOLUTION INTRAVENOUS; SUBCUTANEOUS at 21:04

## 2019-12-03 RX ADMIN — DESMOPRESSIN ACETATE 40 MG: 0.2 TABLET ORAL at 20:58

## 2019-12-03 RX ADMIN — GABAPENTIN 300 MG: 300 CAPSULE ORAL at 20:59

## 2019-12-03 RX ADMIN — OXYCODONE HYDROCHLORIDE AND ACETAMINOPHEN 1 TABLET: 5; 325 TABLET ORAL at 20:59

## 2019-12-03 RX ADMIN — Medication 81 MG: at 11:49

## 2019-12-03 RX ADMIN — INSULIN LISPRO 3 UNITS: 100 INJECTION, SOLUTION INTRAVENOUS; SUBCUTANEOUS at 11:54

## 2019-12-03 RX ADMIN — Medication 2 G: at 08:09

## 2019-12-03 RX ADMIN — INSULIN GLARGINE 20 UNITS: 100 INJECTION, SOLUTION SUBCUTANEOUS at 21:04

## 2019-12-03 RX ADMIN — DAKIN'S SOLUTION 0.125% (QUARTER STRENGTH) 473 ML: 0.12 SOLUTION at 13:18

## 2019-12-03 RX ADMIN — POTASSIUM CHLORIDE 40 MEQ: 1500 TABLET, EXTENDED RELEASE ORAL at 20:59

## 2019-12-03 ASSESSMENT — PAIN SCALES - GENERAL
PAINLEVEL_OUTOF10: 9
PAINLEVEL_OUTOF10: 10
PAINLEVEL_OUTOF10: 9

## 2019-12-04 PROBLEM — S91.331D: Status: ACTIVE | Noted: 2019-12-04

## 2019-12-04 LAB
ABSOLUTE EOS #: 0.23 K/UL (ref 0–0.44)
ABSOLUTE IMMATURE GRANULOCYTE: 0.09 K/UL (ref 0–0.3)
ABSOLUTE LYMPH #: 1.91 K/UL (ref 1.1–3.7)
ABSOLUTE MONO #: 1.08 K/UL (ref 0.1–1.2)
ANION GAP SERPL CALCULATED.3IONS-SCNC: 8 MMOL/L (ref 9–17)
BASOPHILS # BLD: 1 % (ref 0–2)
BASOPHILS ABSOLUTE: 0.09 K/UL (ref 0–0.2)
BUN BLDV-MCNC: 14 MG/DL (ref 6–20)
BUN/CREAT BLD: ABNORMAL (ref 9–20)
CALCIUM SERPL-MCNC: 9 MG/DL (ref 8.6–10.4)
CHLORIDE BLD-SCNC: 95 MMOL/L (ref 98–107)
CO2: 33 MMOL/L (ref 20–31)
CREAT SERPL-MCNC: 0.89 MG/DL (ref 0.5–0.9)
CULTURE: NORMAL
CULTURE: NORMAL
DIFFERENTIAL TYPE: ABNORMAL
EOSINOPHILS RELATIVE PERCENT: 3 % (ref 1–4)
GFR AFRICAN AMERICAN: >60 ML/MIN
GFR NON-AFRICAN AMERICAN: >60 ML/MIN
GFR SERPL CREATININE-BSD FRML MDRD: ABNORMAL ML/MIN/{1.73_M2}
GFR SERPL CREATININE-BSD FRML MDRD: ABNORMAL ML/MIN/{1.73_M2}
GLUCOSE BLD-MCNC: 146 MG/DL (ref 65–105)
GLUCOSE BLD-MCNC: 177 MG/DL (ref 70–99)
GLUCOSE BLD-MCNC: 188 MG/DL (ref 65–105)
GLUCOSE BLD-MCNC: 212 MG/DL (ref 65–105)
GLUCOSE BLD-MCNC: 260 MG/DL (ref 65–105)
GLUCOSE BLD-MCNC: 264 MG/DL (ref 65–105)
HCT VFR BLD CALC: 29.8 % (ref 36.3–47.1)
HEMOGLOBIN: 9.3 G/DL (ref 11.9–15.1)
IMMATURE GRANULOCYTES: 1 %
LYMPHOCYTES # BLD: 21 % (ref 24–43)
Lab: NORMAL
Lab: NORMAL
MCH RBC QN AUTO: 29 PG (ref 25.2–33.5)
MCHC RBC AUTO-ENTMCNC: 31.2 G/DL (ref 28.4–34.8)
MCV RBC AUTO: 92.8 FL (ref 82.6–102.9)
MONOCYTES # BLD: 12 % (ref 3–12)
NRBC AUTOMATED: 0 PER 100 WBC
PDW BLD-RTO: 14.8 % (ref 11.8–14.4)
PLATELET # BLD: 589 K/UL (ref 138–453)
PLATELET ESTIMATE: ABNORMAL
PMV BLD AUTO: 9.1 FL (ref 8.1–13.5)
POTASSIUM SERPL-SCNC: 4.3 MMOL/L (ref 3.7–5.3)
RBC # BLD: 3.21 M/UL (ref 3.95–5.11)
RBC # BLD: ABNORMAL 10*6/UL
SEG NEUTROPHILS: 62 % (ref 36–65)
SEGMENTED NEUTROPHILS ABSOLUTE COUNT: 5.64 K/UL (ref 1.5–8.1)
SODIUM BLD-SCNC: 136 MMOL/L (ref 135–144)
SPECIMEN DESCRIPTION: NORMAL
SPECIMEN DESCRIPTION: NORMAL
WBC # BLD: 9 K/UL (ref 3.5–11.3)
WBC # BLD: ABNORMAL 10*3/UL

## 2019-12-04 PROCEDURE — 6370000000 HC RX 637 (ALT 250 FOR IP): Performed by: STUDENT IN AN ORGANIZED HEALTH CARE EDUCATION/TRAINING PROGRAM

## 2019-12-04 PROCEDURE — 99232 SBSQ HOSP IP/OBS MODERATE 35: CPT | Performed by: INTERNAL MEDICINE

## 2019-12-04 PROCEDURE — 85025 COMPLETE CBC W/AUTO DIFF WBC: CPT

## 2019-12-04 PROCEDURE — 6360000002 HC RX W HCPCS: Performed by: STUDENT IN AN ORGANIZED HEALTH CARE EDUCATION/TRAINING PROGRAM

## 2019-12-04 PROCEDURE — 1200000000 HC SEMI PRIVATE

## 2019-12-04 PROCEDURE — 82947 ASSAY GLUCOSE BLOOD QUANT: CPT

## 2019-12-04 PROCEDURE — 99233 SBSQ HOSP IP/OBS HIGH 50: CPT | Performed by: INTERNAL MEDICINE

## 2019-12-04 PROCEDURE — 80048 BASIC METABOLIC PNL TOTAL CA: CPT

## 2019-12-04 PROCEDURE — 36415 COLL VENOUS BLD VENIPUNCTURE: CPT

## 2019-12-04 PROCEDURE — 2580000003 HC RX 258: Performed by: STUDENT IN AN ORGANIZED HEALTH CARE EDUCATION/TRAINING PROGRAM

## 2019-12-04 RX ADMIN — OXYCODONE HYDROCHLORIDE AND ACETAMINOPHEN 1 TABLET: 5; 325 TABLET ORAL at 08:57

## 2019-12-04 RX ADMIN — SODIUM CHLORIDE, PRESERVATIVE FREE 10 ML: 5 INJECTION INTRAVENOUS at 09:01

## 2019-12-04 RX ADMIN — INSULIN LISPRO 3 UNITS: 100 INJECTION, SOLUTION INTRAVENOUS; SUBCUTANEOUS at 15:26

## 2019-12-04 RX ADMIN — METOPROLOL TARTRATE 25 MG: 25 TABLET ORAL at 08:46

## 2019-12-04 RX ADMIN — INSULIN LISPRO 5 UNITS: 100 INJECTION, SOLUTION INTRAVENOUS; SUBCUTANEOUS at 21:46

## 2019-12-04 RX ADMIN — FAMOTIDINE 20 MG: 20 TABLET, FILM COATED ORAL at 21:45

## 2019-12-04 RX ADMIN — DESMOPRESSIN ACETATE 40 MG: 0.2 TABLET ORAL at 21:46

## 2019-12-04 RX ADMIN — OXYCODONE HYDROCHLORIDE AND ACETAMINOPHEN 1 TABLET: 5; 325 TABLET ORAL at 21:47

## 2019-12-04 RX ADMIN — Medication 2 G: at 23:36

## 2019-12-04 RX ADMIN — CYCLOBENZAPRINE 10 MG: 10 TABLET, FILM COATED ORAL at 08:45

## 2019-12-04 RX ADMIN — SODIUM CHLORIDE, PRESERVATIVE FREE 10 ML: 5 INJECTION INTRAVENOUS at 21:47

## 2019-12-04 RX ADMIN — Medication 2 G: at 15:26

## 2019-12-04 RX ADMIN — FERROUS SULFATE TAB EC 325 MG (65 MG FE EQUIVALENT) 325 MG: 325 (65 FE) TABLET DELAYED RESPONSE at 08:46

## 2019-12-04 RX ADMIN — LISINOPRIL AND HYDROCHLOROTHIAZIDE 1 TABLET: 25; 20 TABLET ORAL at 08:46

## 2019-12-04 RX ADMIN — METOPROLOL TARTRATE 25 MG: 25 TABLET ORAL at 21:47

## 2019-12-04 RX ADMIN — ENOXAPARIN SODIUM 40 MG: 40 INJECTION SUBCUTANEOUS at 08:46

## 2019-12-04 RX ADMIN — INSULIN GLARGINE 20 UNITS: 100 INJECTION, SOLUTION SUBCUTANEOUS at 21:46

## 2019-12-04 RX ADMIN — Medication 2 G: at 08:57

## 2019-12-04 RX ADMIN — Medication 2 G: at 01:08

## 2019-12-04 RX ADMIN — Medication 81 MG: at 08:45

## 2019-12-04 RX ADMIN — FAMOTIDINE 20 MG: 20 TABLET, FILM COATED ORAL at 08:45

## 2019-12-04 RX ADMIN — GABAPENTIN 300 MG: 300 CAPSULE ORAL at 21:45

## 2019-12-04 RX ADMIN — OXYCODONE HYDROCHLORIDE AND ACETAMINOPHEN 1 TABLET: 5; 325 TABLET ORAL at 15:26

## 2019-12-04 ASSESSMENT — PAIN SCALES - GENERAL
PAINLEVEL_OUTOF10: 10
PAINLEVEL_OUTOF10: 5
PAINLEVEL_OUTOF10: 7
PAINLEVEL_OUTOF10: 10

## 2019-12-05 ENCOUNTER — ANESTHESIA EVENT (OUTPATIENT)
Dept: OPERATING ROOM | Age: 54
DRG: 854 | End: 2019-12-05
Payer: COMMERCIAL

## 2019-12-05 ENCOUNTER — HOSPITAL ENCOUNTER (INPATIENT)
Age: 54
LOS: 2 days | Discharge: HOME | DRG: 854 | End: 2019-12-07
Attending: SURGERY | Admitting: SURGERY
Payer: COMMERCIAL

## 2019-12-05 VITALS
BODY MASS INDEX: 26.51 KG/M2 | HEIGHT: 67 IN | WEIGHT: 168.9 LBS | RESPIRATION RATE: 14 BRPM | SYSTOLIC BLOOD PRESSURE: 128 MMHG | DIASTOLIC BLOOD PRESSURE: 72 MMHG | HEART RATE: 71 BPM | OXYGEN SATURATION: 95 % | TEMPERATURE: 98.1 F

## 2019-12-05 DIAGNOSIS — S91.331D: Primary | ICD-10-CM

## 2019-12-05 PROBLEM — A41.9 SEPSIS (HCC): Status: RESOLVED | Noted: 2019-11-24 | Resolved: 2019-12-05

## 2019-12-05 PROBLEM — B95.61 MSSA BACTEREMIA: Status: ACTIVE | Noted: 2019-12-05

## 2019-12-05 PROBLEM — J81.0 ACUTE PULMONARY EDEMA (HCC): Status: RESOLVED | Noted: 2019-11-29 | Resolved: 2019-12-05

## 2019-12-05 PROBLEM — R78.81 MSSA BACTEREMIA: Status: ACTIVE | Noted: 2019-12-05

## 2019-12-05 PROBLEM — R65.21 SEPTIC SHOCK (HCC): Status: RESOLVED | Noted: 2019-11-27 | Resolved: 2019-12-05

## 2019-12-05 PROBLEM — A41.9 SEPTIC SHOCK (HCC): Status: RESOLVED | Noted: 2019-11-27 | Resolved: 2019-12-05

## 2019-12-05 LAB
ABSOLUTE EOS #: 0.24 K/UL (ref 0–0.44)
ABSOLUTE IMMATURE GRANULOCYTE: 0.07 K/UL (ref 0–0.3)
ABSOLUTE LYMPH #: 1.59 K/UL (ref 1.1–3.7)
ABSOLUTE MONO #: 0.92 K/UL (ref 0.1–1.2)
ANION GAP SERPL CALCULATED.3IONS-SCNC: 9 MMOL/L (ref 9–17)
BASOPHILS # BLD: 1 % (ref 0–2)
BASOPHILS ABSOLUTE: 0.09 K/UL (ref 0–0.2)
BUN BLDV-MCNC: 18 MG/DL (ref 6–20)
BUN/CREAT BLD: ABNORMAL (ref 9–20)
CALCIUM SERPL-MCNC: 9 MG/DL (ref 8.6–10.4)
CHLORIDE BLD-SCNC: 95 MMOL/L (ref 98–107)
CO2: 33 MMOL/L (ref 20–31)
CREAT SERPL-MCNC: 0.87 MG/DL (ref 0.5–0.9)
DIFFERENTIAL TYPE: ABNORMAL
EOSINOPHILS RELATIVE PERCENT: 3 % (ref 1–4)
GFR AFRICAN AMERICAN: >60 ML/MIN
GFR NON-AFRICAN AMERICAN: >60 ML/MIN
GFR SERPL CREATININE-BSD FRML MDRD: ABNORMAL ML/MIN/{1.73_M2}
GFR SERPL CREATININE-BSD FRML MDRD: ABNORMAL ML/MIN/{1.73_M2}
GLUCOSE BLD-MCNC: 138 MG/DL (ref 65–105)
GLUCOSE BLD-MCNC: 172 MG/DL (ref 65–105)
GLUCOSE BLD-MCNC: 196 MG/DL (ref 65–105)
GLUCOSE BLD-MCNC: 201 MG/DL (ref 70–99)
GLUCOSE BLD-MCNC: 342 MG/DL (ref 65–105)
HCT VFR BLD CALC: 31.2 % (ref 36.3–47.1)
HEMOGLOBIN: 9.5 G/DL (ref 11.9–15.1)
IMMATURE GRANULOCYTES: 1 %
LYMPHOCYTES # BLD: 16 % (ref 24–43)
MCH RBC QN AUTO: 28.4 PG (ref 25.2–33.5)
MCHC RBC AUTO-ENTMCNC: 30.4 G/DL (ref 28.4–34.8)
MCV RBC AUTO: 93.4 FL (ref 82.6–102.9)
MONOCYTES # BLD: 9 % (ref 3–12)
NRBC AUTOMATED: 0 PER 100 WBC
PDW BLD-RTO: 14.8 % (ref 11.8–14.4)
PLATELET # BLD: 631 K/UL (ref 138–453)
PLATELET ESTIMATE: ABNORMAL
PMV BLD AUTO: 9.1 FL (ref 8.1–13.5)
POTASSIUM SERPL-SCNC: 4.9 MMOL/L (ref 3.7–5.3)
RBC # BLD: 3.34 M/UL (ref 3.95–5.11)
RBC # BLD: ABNORMAL 10*6/UL
SEG NEUTROPHILS: 70 % (ref 36–65)
SEGMENTED NEUTROPHILS ABSOLUTE COUNT: 6.86 K/UL (ref 1.5–8.1)
SODIUM BLD-SCNC: 137 MMOL/L (ref 135–144)
WBC # BLD: 9.8 K/UL (ref 3.5–11.3)
WBC # BLD: ABNORMAL 10*3/UL

## 2019-12-05 PROCEDURE — 99232 SBSQ HOSP IP/OBS MODERATE 35: CPT | Performed by: INTERNAL MEDICINE

## 2019-12-05 PROCEDURE — 36415 COLL VENOUS BLD VENIPUNCTURE: CPT

## 2019-12-05 PROCEDURE — 6360000002 HC RX W HCPCS: Performed by: STUDENT IN AN ORGANIZED HEALTH CARE EDUCATION/TRAINING PROGRAM

## 2019-12-05 PROCEDURE — 6370000000 HC RX 637 (ALT 250 FOR IP): Performed by: STUDENT IN AN ORGANIZED HEALTH CARE EDUCATION/TRAINING PROGRAM

## 2019-12-05 PROCEDURE — 86850 RBC ANTIBODY SCREEN: CPT

## 2019-12-05 PROCEDURE — 85025 COMPLETE CBC W/AUTO DIFF WBC: CPT

## 2019-12-05 PROCEDURE — 6370000000 HC RX 637 (ALT 250 FOR IP): Performed by: INTERNAL MEDICINE

## 2019-12-05 PROCEDURE — 86900 BLOOD TYPING SEROLOGIC ABO: CPT

## 2019-12-05 PROCEDURE — APPNB30 APP NON BILLABLE TIME 0-30 MINS: Performed by: NURSE PRACTITIONER

## 2019-12-05 PROCEDURE — 2580000003 HC RX 258: Performed by: STUDENT IN AN ORGANIZED HEALTH CARE EDUCATION/TRAINING PROGRAM

## 2019-12-05 PROCEDURE — 82947 ASSAY GLUCOSE BLOOD QUANT: CPT

## 2019-12-05 PROCEDURE — 1200000000 HC SEMI PRIVATE

## 2019-12-05 PROCEDURE — 86901 BLOOD TYPING SEROLOGIC RH(D): CPT

## 2019-12-05 PROCEDURE — 86920 COMPATIBILITY TEST SPIN: CPT

## 2019-12-05 PROCEDURE — 80048 BASIC METABOLIC PNL TOTAL CA: CPT

## 2019-12-05 RX ORDER — DEXTROSE MONOHYDRATE 25 G/50ML
12.5 INJECTION, SOLUTION INTRAVENOUS PRN
Status: CANCELLED | OUTPATIENT
Start: 2019-12-05

## 2019-12-05 RX ORDER — DEXTROSE MONOHYDRATE 25 G/50ML
12.5 INJECTION, SOLUTION INTRAVENOUS PRN
Status: DISCONTINUED | OUTPATIENT
Start: 2019-12-05 | End: 2019-12-07 | Stop reason: HOSPADM

## 2019-12-05 RX ORDER — ONDANSETRON 2 MG/ML
4 INJECTION INTRAMUSCULAR; INTRAVENOUS EVERY 6 HOURS PRN
Status: CANCELLED | OUTPATIENT
Start: 2019-12-05

## 2019-12-05 RX ORDER — LISINOPRIL AND HYDROCHLOROTHIAZIDE 25; 20 MG/1; MG/1
1 TABLET ORAL DAILY
Status: DISCONTINUED | OUTPATIENT
Start: 2019-12-06 | End: 2019-12-07 | Stop reason: HOSPADM

## 2019-12-05 RX ORDER — CYCLOBENZAPRINE HCL 10 MG
10 TABLET ORAL 3 TIMES DAILY PRN
Status: DISCONTINUED | OUTPATIENT
Start: 2019-12-05 | End: 2019-12-07 | Stop reason: HOSPADM

## 2019-12-05 RX ORDER — OXYCODONE HYDROCHLORIDE AND ACETAMINOPHEN 5; 325 MG/1; MG/1
1 TABLET ORAL EVERY 6 HOURS PRN
Status: CANCELLED | OUTPATIENT
Start: 2019-12-05

## 2019-12-05 RX ORDER — FAMOTIDINE 20 MG/1
20 TABLET, FILM COATED ORAL 2 TIMES DAILY
Status: DISCONTINUED | OUTPATIENT
Start: 2019-12-05 | End: 2019-12-07 | Stop reason: HOSPADM

## 2019-12-05 RX ORDER — SODIUM CHLORIDE 0.9 % (FLUSH) 0.9 %
10 SYRINGE (ML) INJECTION PRN
Status: CANCELLED | OUTPATIENT
Start: 2019-12-05

## 2019-12-05 RX ORDER — SODIUM HYPOCHLORITE 1.25 MG/ML
SOLUTION TOPICAL DAILY
Status: DISCONTINUED | OUTPATIENT
Start: 2019-12-06 | End: 2019-12-07 | Stop reason: HOSPADM

## 2019-12-05 RX ORDER — LANOLIN ALCOHOL/MO/W.PET/CERES
325 CREAM (GRAM) TOPICAL
Status: DISCONTINUED | OUTPATIENT
Start: 2019-12-06 | End: 2019-12-07 | Stop reason: HOSPADM

## 2019-12-05 RX ORDER — SODIUM HYPOCHLORITE 1.25 MG/ML
SOLUTION TOPICAL DAILY
Status: CANCELLED | OUTPATIENT
Start: 2019-12-06

## 2019-12-05 RX ORDER — FAMOTIDINE 20 MG/1
20 TABLET, FILM COATED ORAL 2 TIMES DAILY
Status: CANCELLED | OUTPATIENT
Start: 2019-12-05

## 2019-12-05 RX ORDER — SODIUM CHLORIDE 0.9 % (FLUSH) 0.9 %
10 SYRINGE (ML) INJECTION PRN
Status: DISCONTINUED | OUTPATIENT
Start: 2019-12-05 | End: 2019-12-05 | Stop reason: SDUPTHER

## 2019-12-05 RX ORDER — INSULIN GLARGINE 100 [IU]/ML
20 INJECTION, SOLUTION SUBCUTANEOUS NIGHTLY
Status: DISCONTINUED | OUTPATIENT
Start: 2019-12-05 | End: 2019-12-07

## 2019-12-05 RX ORDER — GABAPENTIN 300 MG/1
300 CAPSULE ORAL 3 TIMES DAILY
Status: DISCONTINUED | OUTPATIENT
Start: 2019-12-05 | End: 2019-12-07 | Stop reason: HOSPADM

## 2019-12-05 RX ORDER — NICOTINE POLACRILEX 4 MG
15 LOZENGE BUCCAL PRN
Status: CANCELLED | OUTPATIENT
Start: 2019-12-05

## 2019-12-05 RX ORDER — ONDANSETRON 2 MG/ML
4 INJECTION INTRAMUSCULAR; INTRAVENOUS EVERY 6 HOURS PRN
Status: DISCONTINUED | OUTPATIENT
Start: 2019-12-05 | End: 2019-12-05

## 2019-12-05 RX ORDER — INSULIN GLARGINE 100 [IU]/ML
20 INJECTION, SOLUTION SUBCUTANEOUS NIGHTLY
Status: CANCELLED | OUTPATIENT
Start: 2019-12-05

## 2019-12-05 RX ORDER — SODIUM CHLORIDE 0.9 % (FLUSH) 0.9 %
10 SYRINGE (ML) INJECTION PRN
Status: DISCONTINUED | OUTPATIENT
Start: 2019-12-05 | End: 2019-12-07 | Stop reason: HOSPADM

## 2019-12-05 RX ORDER — ONDANSETRON 4 MG/1
4 TABLET, ORALLY DISINTEGRATING ORAL EVERY 6 HOURS PRN
Status: DISCONTINUED | OUTPATIENT
Start: 2019-12-05 | End: 2019-12-07 | Stop reason: HOSPADM

## 2019-12-05 RX ORDER — ONDANSETRON 2 MG/ML
4 INJECTION INTRAMUSCULAR; INTRAVENOUS EVERY 6 HOURS PRN
Status: DISCONTINUED | OUTPATIENT
Start: 2019-12-05 | End: 2019-12-07 | Stop reason: HOSPADM

## 2019-12-05 RX ORDER — LANOLIN ALCOHOL/MO/W.PET/CERES
325 CREAM (GRAM) TOPICAL
Status: CANCELLED | OUTPATIENT
Start: 2019-12-06

## 2019-12-05 RX ORDER — TRAZODONE HYDROCHLORIDE 50 MG/1
50 TABLET ORAL NIGHTLY PRN
Status: DISCONTINUED | OUTPATIENT
Start: 2019-12-05 | End: 2019-12-07 | Stop reason: HOSPADM

## 2019-12-05 RX ORDER — ASPIRIN 81 MG/1
81 TABLET ORAL DAILY
Status: CANCELLED | OUTPATIENT
Start: 2019-12-06

## 2019-12-05 RX ORDER — ATORVASTATIN CALCIUM 40 MG/1
40 TABLET, FILM COATED ORAL DAILY
Status: CANCELLED | OUTPATIENT
Start: 2019-12-05

## 2019-12-05 RX ORDER — ASPIRIN 81 MG/1
81 TABLET ORAL DAILY
Status: DISCONTINUED | OUTPATIENT
Start: 2019-12-06 | End: 2019-12-07 | Stop reason: HOSPADM

## 2019-12-05 RX ORDER — SODIUM HYPOCHLORITE 1.25 MG/ML
SOLUTION TOPICAL EVERY 8 HOURS SCHEDULED
Status: CANCELLED | OUTPATIENT
Start: 2019-12-05

## 2019-12-05 RX ORDER — 0.9 % SODIUM CHLORIDE 0.9 %
250 INTRAVENOUS SOLUTION INTRAVENOUS ONCE
Status: DISCONTINUED | OUTPATIENT
Start: 2019-12-05 | End: 2019-12-07 | Stop reason: HOSPADM

## 2019-12-05 RX ORDER — GABAPENTIN 300 MG/1
300 CAPSULE ORAL 3 TIMES DAILY
Status: CANCELLED | OUTPATIENT
Start: 2019-12-05

## 2019-12-05 RX ORDER — ACETAMINOPHEN 325 MG/1
650 TABLET ORAL EVERY 4 HOURS PRN
Status: DISCONTINUED | OUTPATIENT
Start: 2019-12-05 | End: 2019-12-07 | Stop reason: HOSPADM

## 2019-12-05 RX ORDER — CYCLOBENZAPRINE HCL 10 MG
10 TABLET ORAL 3 TIMES DAILY PRN
Status: CANCELLED | OUTPATIENT
Start: 2019-12-05

## 2019-12-05 RX ORDER — DEXTROSE MONOHYDRATE 50 MG/ML
100 INJECTION, SOLUTION INTRAVENOUS PRN
Status: CANCELLED | OUTPATIENT
Start: 2019-12-05

## 2019-12-05 RX ORDER — SODIUM CHLORIDE 0.9 % (FLUSH) 0.9 %
10 SYRINGE (ML) INJECTION 2 TIMES DAILY
Status: CANCELLED | OUTPATIENT
Start: 2019-12-05

## 2019-12-05 RX ORDER — LISINOPRIL AND HYDROCHLOROTHIAZIDE 25; 20 MG/1; MG/1
1 TABLET ORAL DAILY
Status: CANCELLED | OUTPATIENT
Start: 2019-12-06

## 2019-12-05 RX ORDER — TRAZODONE HYDROCHLORIDE 50 MG/1
50 TABLET ORAL NIGHTLY PRN
Status: CANCELLED | OUTPATIENT
Start: 2019-12-05

## 2019-12-05 RX ORDER — DEXTROSE MONOHYDRATE 50 MG/ML
100 INJECTION, SOLUTION INTRAVENOUS PRN
Status: DISCONTINUED | OUTPATIENT
Start: 2019-12-05 | End: 2019-12-07 | Stop reason: HOSPADM

## 2019-12-05 RX ORDER — OXYCODONE HYDROCHLORIDE AND ACETAMINOPHEN 5; 325 MG/1; MG/1
1 TABLET ORAL EVERY 6 HOURS PRN
Status: DISCONTINUED | OUTPATIENT
Start: 2019-12-05 | End: 2019-12-07 | Stop reason: HOSPADM

## 2019-12-05 RX ORDER — NICOTINE POLACRILEX 4 MG
15 LOZENGE BUCCAL PRN
Status: DISCONTINUED | OUTPATIENT
Start: 2019-12-05 | End: 2019-12-07 | Stop reason: HOSPADM

## 2019-12-05 RX ORDER — ACETAMINOPHEN 325 MG/1
650 TABLET ORAL EVERY 4 HOURS PRN
Status: CANCELLED | OUTPATIENT
Start: 2019-12-05

## 2019-12-05 RX ORDER — SODIUM HYPOCHLORITE 1.25 MG/ML
SOLUTION TOPICAL EVERY 8 HOURS SCHEDULED
Status: DISCONTINUED | OUTPATIENT
Start: 2019-12-05 | End: 2019-12-07 | Stop reason: HOSPADM

## 2019-12-05 RX ORDER — SODIUM CHLORIDE 0.9 % (FLUSH) 0.9 %
10 SYRINGE (ML) INJECTION 2 TIMES DAILY
Status: DISCONTINUED | OUTPATIENT
Start: 2019-12-05 | End: 2019-12-07 | Stop reason: HOSPADM

## 2019-12-05 RX ORDER — ATORVASTATIN CALCIUM 40 MG/1
40 TABLET, FILM COATED ORAL DAILY
Status: DISCONTINUED | OUTPATIENT
Start: 2019-12-05 | End: 2019-12-07 | Stop reason: HOSPADM

## 2019-12-05 RX ADMIN — METOPROLOL TARTRATE 25 MG: 25 TABLET ORAL at 08:35

## 2019-12-05 RX ADMIN — ATORVASTATIN CALCIUM 40 MG: 40 TABLET, FILM COATED ORAL at 22:29

## 2019-12-05 RX ADMIN — DAKIN'S SOLUTION 0.125% (QUARTER STRENGTH): 0.12 SOLUTION at 12:49

## 2019-12-05 RX ADMIN — FAMOTIDINE 20 MG: 20 TABLET, FILM COATED ORAL at 08:34

## 2019-12-05 RX ADMIN — METOPROLOL TARTRATE 25 MG: 25 TABLET ORAL at 22:30

## 2019-12-05 RX ADMIN — INSULIN LISPRO 6 UNITS: 100 INJECTION, SOLUTION INTRAVENOUS; SUBCUTANEOUS at 22:29

## 2019-12-05 RX ADMIN — OXYCODONE HYDROCHLORIDE AND ACETAMINOPHEN 1 TABLET: 5; 325 TABLET ORAL at 17:18

## 2019-12-05 RX ADMIN — Medication 10 ML: at 23:33

## 2019-12-05 RX ADMIN — GABAPENTIN 300 MG: 300 CAPSULE ORAL at 22:30

## 2019-12-05 RX ADMIN — FAMOTIDINE 20 MG: 20 TABLET ORAL at 22:30

## 2019-12-05 RX ADMIN — OXYCODONE AND ACETAMINOPHEN 1 TABLET: 5; 325 TABLET ORAL at 22:40

## 2019-12-05 RX ADMIN — Medication 10 ML: at 22:35

## 2019-12-05 RX ADMIN — INSULIN GLARGINE 20 UNITS: 100 INJECTION, SOLUTION SUBCUTANEOUS at 22:29

## 2019-12-05 RX ADMIN — LISINOPRIL AND HYDROCHLOROTHIAZIDE 1 TABLET: 25; 20 TABLET ORAL at 08:34

## 2019-12-05 RX ADMIN — SODIUM CHLORIDE, PRESERVATIVE FREE 10 ML: 5 INJECTION INTRAVENOUS at 08:37

## 2019-12-05 RX ADMIN — Medication 81 MG: at 08:35

## 2019-12-05 RX ADMIN — INSULIN LISPRO 3 UNITS: 100 INJECTION, SOLUTION INTRAVENOUS; SUBCUTANEOUS at 16:41

## 2019-12-05 RX ADMIN — DEXTROSE MONOHYDRATE 2 G: 50 INJECTION, SOLUTION INTRAVENOUS at 23:30

## 2019-12-05 RX ADMIN — OXYCODONE HYDROCHLORIDE AND ACETAMINOPHEN 1 TABLET: 5; 325 TABLET ORAL at 04:06

## 2019-12-05 RX ADMIN — OXYCODONE HYDROCHLORIDE AND ACETAMINOPHEN 1 TABLET: 5; 325 TABLET ORAL at 11:04

## 2019-12-05 RX ADMIN — ENOXAPARIN SODIUM 40 MG: 40 INJECTION SUBCUTANEOUS at 08:35

## 2019-12-05 RX ADMIN — Medication 2 G: at 08:45

## 2019-12-05 RX ADMIN — Medication 2 G: at 16:32

## 2019-12-05 RX ADMIN — FERROUS SULFATE TAB EC 325 MG (65 MG FE EQUIVALENT) 325 MG: 325 (65 FE) TABLET DELAYED RESPONSE at 08:35

## 2019-12-05 RX ADMIN — INSULIN LISPRO 3 UNITS: 100 INJECTION, SOLUTION INTRAVENOUS; SUBCUTANEOUS at 08:47

## 2019-12-05 ASSESSMENT — PAIN SCALES - GENERAL
PAINLEVEL_OUTOF10: 7
PAINLEVEL_OUTOF10: 4
PAINLEVEL_OUTOF10: 8
PAINLEVEL_OUTOF10: 8
PAINLEVEL_OUTOF10: 7
PAINLEVEL_OUTOF10: 7
PAINLEVEL_OUTOF10: 6
PAINLEVEL_OUTOF10: 3

## 2019-12-05 ASSESSMENT — PAIN DESCRIPTION - ONSET: ONSET: ON-GOING

## 2019-12-05 ASSESSMENT — PAIN DESCRIPTION - ORIENTATION: ORIENTATION: LEFT

## 2019-12-05 ASSESSMENT — PAIN DESCRIPTION - LOCATION: LOCATION: NECK

## 2019-12-05 ASSESSMENT — PAIN DESCRIPTION - DESCRIPTORS: DESCRIPTORS: ACHING

## 2019-12-05 ASSESSMENT — PAIN - FUNCTIONAL ASSESSMENT: PAIN_FUNCTIONAL_ASSESSMENT: PREVENTS OR INTERFERES SOME ACTIVE ACTIVITIES AND ADLS

## 2019-12-05 ASSESSMENT — PAIN DESCRIPTION - FREQUENCY: FREQUENCY: CONTINUOUS

## 2019-12-05 ASSESSMENT — PAIN DESCRIPTION - PROGRESSION: CLINICAL_PROGRESSION: GRADUALLY WORSENING

## 2019-12-05 ASSESSMENT — PAIN DESCRIPTION - PAIN TYPE: TYPE: ACUTE PAIN;CHRONIC PAIN

## 2019-12-06 ENCOUNTER — ANESTHESIA (OUTPATIENT)
Dept: OPERATING ROOM | Age: 54
DRG: 854 | End: 2019-12-06
Payer: COMMERCIAL

## 2019-12-06 VITALS — SYSTOLIC BLOOD PRESSURE: 112 MMHG | OXYGEN SATURATION: 99 % | DIASTOLIC BLOOD PRESSURE: 55 MMHG

## 2019-12-06 LAB
ABSOLUTE EOS #: 0.36 K/UL (ref 0–0.44)
ABSOLUTE IMMATURE GRANULOCYTE: 0.07 K/UL (ref 0–0.3)
ABSOLUTE LYMPH #: 2.16 K/UL (ref 1.1–3.7)
ABSOLUTE MONO #: 1.13 K/UL (ref 0.1–1.2)
ANION GAP SERPL CALCULATED.3IONS-SCNC: 9 MMOL/L (ref 9–17)
BASOPHILS # BLD: 1 % (ref 0–2)
BASOPHILS ABSOLUTE: 0.12 K/UL (ref 0–0.2)
BUN BLDV-MCNC: 22 MG/DL (ref 6–20)
BUN/CREAT BLD: 23 (ref 9–20)
CALCIUM SERPL-MCNC: 9.2 MG/DL (ref 8.6–10.4)
CHLORIDE BLD-SCNC: 91 MMOL/L (ref 98–107)
CO2: 30 MMOL/L (ref 20–31)
CREAT SERPL-MCNC: 0.97 MG/DL (ref 0.5–0.9)
DIFFERENTIAL TYPE: ABNORMAL
EOSINOPHILS RELATIVE PERCENT: 3 % (ref 1–4)
GFR AFRICAN AMERICAN: >60 ML/MIN
GFR NON-AFRICAN AMERICAN: >60 ML/MIN
GFR SERPL CREATININE-BSD FRML MDRD: ABNORMAL ML/MIN/{1.73_M2}
GFR SERPL CREATININE-BSD FRML MDRD: ABNORMAL ML/MIN/{1.73_M2}
GLUCOSE BLD-MCNC: 105 MG/DL (ref 65–105)
GLUCOSE BLD-MCNC: 137 MG/DL (ref 65–105)
GLUCOSE BLD-MCNC: 157 MG/DL (ref 70–99)
GLUCOSE BLD-MCNC: 327 MG/DL (ref 65–105)
GLUCOSE BLD-MCNC: 91 MG/DL (ref 65–105)
HCT VFR BLD CALC: 29.2 % (ref 36.3–47.1)
HEMOGLOBIN: 8.9 G/DL (ref 11.9–15.1)
IMMATURE GRANULOCYTES: 1 %
LYMPHOCYTES # BLD: 20 % (ref 24–43)
MCH RBC QN AUTO: 28.2 PG (ref 25.2–33.5)
MCHC RBC AUTO-ENTMCNC: 30.5 G/DL (ref 28.4–34.8)
MCV RBC AUTO: 92.4 FL (ref 82.6–102.9)
MONOCYTES # BLD: 10 % (ref 3–12)
NRBC AUTOMATED: 0 PER 100 WBC
PDW BLD-RTO: 14.7 % (ref 11.8–14.4)
PLATELET # BLD: 592 K/UL (ref 138–453)
PLATELET ESTIMATE: ABNORMAL
PMV BLD AUTO: 9.1 FL (ref 8.1–13.5)
POTASSIUM SERPL-SCNC: 4.5 MMOL/L (ref 3.7–5.3)
RBC # BLD: 3.16 M/UL (ref 3.95–5.11)
RBC # BLD: ABNORMAL 10*6/UL
SEG NEUTROPHILS: 65 % (ref 36–65)
SEGMENTED NEUTROPHILS ABSOLUTE COUNT: 6.98 K/UL (ref 1.5–8.1)
SODIUM BLD-SCNC: 130 MMOL/L (ref 135–144)
WBC # BLD: 10.8 K/UL (ref 3.5–11.3)
WBC # BLD: ABNORMAL 10*3/UL

## 2019-12-06 PROCEDURE — 7100000001 HC PACU RECOVERY - ADDTL 15 MIN: Performed by: SURGERY

## 2019-12-06 PROCEDURE — 3600000012 HC SURGERY LEVEL 2 ADDTL 15MIN: Performed by: SURGERY

## 2019-12-06 PROCEDURE — 2580000003 HC RX 258: Performed by: STUDENT IN AN ORGANIZED HEALTH CARE EDUCATION/TRAINING PROGRAM

## 2019-12-06 PROCEDURE — 6370000000 HC RX 637 (ALT 250 FOR IP): Performed by: SURGERY

## 2019-12-06 PROCEDURE — 82947 ASSAY GLUCOSE BLOOD QUANT: CPT

## 2019-12-06 PROCEDURE — 6360000002 HC RX W HCPCS: Performed by: SURGERY

## 2019-12-06 PROCEDURE — 2500000003 HC RX 250 WO HCPCS: Performed by: SURGERY

## 2019-12-06 PROCEDURE — 2500000003 HC RX 250 WO HCPCS: Performed by: NURSE ANESTHETIST, CERTIFIED REGISTERED

## 2019-12-06 PROCEDURE — 6360000002 HC RX W HCPCS: Performed by: ANESTHESIOLOGY

## 2019-12-06 PROCEDURE — 1200000000 HC SEMI PRIVATE

## 2019-12-06 PROCEDURE — 99233 SBSQ HOSP IP/OBS HIGH 50: CPT | Performed by: INTERNAL MEDICINE

## 2019-12-06 PROCEDURE — 36415 COLL VENOUS BLD VENIPUNCTURE: CPT

## 2019-12-06 PROCEDURE — 2580000003 HC RX 258: Performed by: NURSE ANESTHETIST, CERTIFIED REGISTERED

## 2019-12-06 PROCEDURE — 3700000001 HC ADD 15 MINUTES (ANESTHESIA): Performed by: SURGERY

## 2019-12-06 PROCEDURE — 2580000003 HC RX 258: Performed by: NURSE PRACTITIONER

## 2019-12-06 PROCEDURE — 2709999900 HC NON-CHARGEABLE SUPPLY: Performed by: SURGERY

## 2019-12-06 PROCEDURE — 6360000002 HC RX W HCPCS: Performed by: NURSE ANESTHETIST, CERTIFIED REGISTERED

## 2019-12-06 PROCEDURE — 3600000002 HC SURGERY LEVEL 2 BASE: Performed by: SURGERY

## 2019-12-06 PROCEDURE — 3700000000 HC ANESTHESIA ATTENDED CARE: Performed by: SURGERY

## 2019-12-06 PROCEDURE — 6370000000 HC RX 637 (ALT 250 FOR IP): Performed by: STUDENT IN AN ORGANIZED HEALTH CARE EDUCATION/TRAINING PROGRAM

## 2019-12-06 PROCEDURE — 85025 COMPLETE CBC W/AUTO DIFF WBC: CPT

## 2019-12-06 PROCEDURE — 0SBK0ZZ EXCISION OF RIGHT TARSOMETATARSAL JOINT, OPEN APPROACH: ICD-10-PCS | Performed by: SURGERY

## 2019-12-06 PROCEDURE — 80048 BASIC METABOLIC PNL TOTAL CA: CPT

## 2019-12-06 PROCEDURE — 88304 TISSUE EXAM BY PATHOLOGIST: CPT

## 2019-12-06 PROCEDURE — 6360000002 HC RX W HCPCS: Performed by: STUDENT IN AN ORGANIZED HEALTH CARE EDUCATION/TRAINING PROGRAM

## 2019-12-06 PROCEDURE — 7100000000 HC PACU RECOVERY - FIRST 15 MIN: Performed by: SURGERY

## 2019-12-06 PROCEDURE — 99253 IP/OBS CNSLTJ NEW/EST LOW 45: CPT | Performed by: NURSE PRACTITIONER

## 2019-12-06 PROCEDURE — 2580000003 HC RX 258: Performed by: SURGERY

## 2019-12-06 RX ORDER — FENTANYL CITRATE 50 UG/ML
50 INJECTION, SOLUTION INTRAMUSCULAR; INTRAVENOUS EVERY 5 MIN PRN
Status: DISCONTINUED | OUTPATIENT
Start: 2019-12-06 | End: 2019-12-06 | Stop reason: HOSPADM

## 2019-12-06 RX ORDER — FENTANYL CITRATE 50 UG/ML
25 INJECTION, SOLUTION INTRAMUSCULAR; INTRAVENOUS EVERY 5 MIN PRN
Status: DISCONTINUED | OUTPATIENT
Start: 2019-12-06 | End: 2019-12-06 | Stop reason: HOSPADM

## 2019-12-06 RX ORDER — SODIUM CHLORIDE, SODIUM LACTATE, POTASSIUM CHLORIDE, CALCIUM CHLORIDE 600; 310; 30; 20 MG/100ML; MG/100ML; MG/100ML; MG/100ML
INJECTION, SOLUTION INTRAVENOUS CONTINUOUS PRN
Status: DISCONTINUED | OUTPATIENT
Start: 2019-12-06 | End: 2019-12-06 | Stop reason: SDUPTHER

## 2019-12-06 RX ORDER — LIDOCAINE HYDROCHLORIDE 20 MG/ML
INJECTION, SOLUTION EPIDURAL; INFILTRATION; INTRACAUDAL; PERINEURAL PRN
Status: DISCONTINUED | OUTPATIENT
Start: 2019-12-06 | End: 2019-12-06 | Stop reason: SDUPTHER

## 2019-12-06 RX ORDER — DEXAMETHASONE SODIUM PHOSPHATE 10 MG/ML
INJECTION INTRAMUSCULAR; INTRAVENOUS PRN
Status: DISCONTINUED | OUTPATIENT
Start: 2019-12-06 | End: 2019-12-06 | Stop reason: SDUPTHER

## 2019-12-06 RX ORDER — CEFAZOLIN SODIUM 1 G/3ML
INJECTION, POWDER, FOR SOLUTION INTRAMUSCULAR; INTRAVENOUS PRN
Status: DISCONTINUED | OUTPATIENT
Start: 2019-12-06 | End: 2019-12-06 | Stop reason: SDUPTHER

## 2019-12-06 RX ORDER — ONDANSETRON 2 MG/ML
4 INJECTION INTRAMUSCULAR; INTRAVENOUS
Status: DISCONTINUED | OUTPATIENT
Start: 2019-12-06 | End: 2019-12-06 | Stop reason: HOSPADM

## 2019-12-06 RX ORDER — HYDROMORPHONE HCL 110MG/55ML
0.25 PATIENT CONTROLLED ANALGESIA SYRINGE INTRAVENOUS EVERY 5 MIN PRN
Status: DISCONTINUED | OUTPATIENT
Start: 2019-12-06 | End: 2019-12-06 | Stop reason: HOSPADM

## 2019-12-06 RX ORDER — PROPOFOL 10 MG/ML
INJECTION, EMULSION INTRAVENOUS PRN
Status: DISCONTINUED | OUTPATIENT
Start: 2019-12-06 | End: 2019-12-06 | Stop reason: SDUPTHER

## 2019-12-06 RX ORDER — PHENYLEPHRINE HCL IN 0.9% NACL 1 MG/10 ML
SYRINGE (ML) INTRAVENOUS PRN
Status: DISCONTINUED | OUTPATIENT
Start: 2019-12-06 | End: 2019-12-06 | Stop reason: SDUPTHER

## 2019-12-06 RX ORDER — HYDROMORPHONE HCL 110MG/55ML
0.5 PATIENT CONTROLLED ANALGESIA SYRINGE INTRAVENOUS EVERY 5 MIN PRN
Status: DISCONTINUED | OUTPATIENT
Start: 2019-12-06 | End: 2019-12-06 | Stop reason: HOSPADM

## 2019-12-06 RX ORDER — ONDANSETRON 2 MG/ML
INJECTION INTRAMUSCULAR; INTRAVENOUS PRN
Status: DISCONTINUED | OUTPATIENT
Start: 2019-12-06 | End: 2019-12-06 | Stop reason: SDUPTHER

## 2019-12-06 RX ORDER — MIDAZOLAM HYDROCHLORIDE 1 MG/ML
INJECTION INTRAMUSCULAR; INTRAVENOUS PRN
Status: DISCONTINUED | OUTPATIENT
Start: 2019-12-06 | End: 2019-12-06 | Stop reason: SDUPTHER

## 2019-12-06 RX ORDER — SODIUM CHLORIDE 9 MG/ML
INJECTION, SOLUTION INTRAVENOUS CONTINUOUS
Status: DISCONTINUED | OUTPATIENT
Start: 2019-12-06 | End: 2019-12-07 | Stop reason: HOSPADM

## 2019-12-06 RX ORDER — FENTANYL CITRATE 50 UG/ML
INJECTION, SOLUTION INTRAMUSCULAR; INTRAVENOUS PRN
Status: DISCONTINUED | OUTPATIENT
Start: 2019-12-06 | End: 2019-12-06 | Stop reason: SDUPTHER

## 2019-12-06 RX ADMIN — INSULIN GLARGINE 20 UNITS: 100 INJECTION, SOLUTION SUBCUTANEOUS at 23:09

## 2019-12-06 RX ADMIN — FAMOTIDINE 20 MG: 20 TABLET ORAL at 23:08

## 2019-12-06 RX ADMIN — MIDAZOLAM 2 MG: 1 INJECTION INTRAMUSCULAR; INTRAVENOUS at 14:15

## 2019-12-06 RX ADMIN — Medication 100 MCG: at 14:50

## 2019-12-06 RX ADMIN — Medication 100 MCG: at 14:42

## 2019-12-06 RX ADMIN — Medication 200 MCG: at 14:28

## 2019-12-06 RX ADMIN — SODIUM CHLORIDE, POTASSIUM CHLORIDE, SODIUM LACTATE AND CALCIUM CHLORIDE: 600; 310; 30; 20 INJECTION, SOLUTION INTRAVENOUS at 14:15

## 2019-12-06 RX ADMIN — CEFAZOLIN 2000 MG: 1 INJECTION, POWDER, FOR SOLUTION INTRAMUSCULAR; INTRAVENOUS at 14:30

## 2019-12-06 RX ADMIN — OXYCODONE AND ACETAMINOPHEN 1 TABLET: 5; 325 TABLET ORAL at 06:38

## 2019-12-06 RX ADMIN — ONDANSETRON 4 MG: 2 INJECTION, SOLUTION INTRAMUSCULAR; INTRAVENOUS at 14:28

## 2019-12-06 RX ADMIN — DEXTROSE MONOHYDRATE 2 G: 50 INJECTION, SOLUTION INTRAVENOUS at 09:28

## 2019-12-06 RX ADMIN — LIDOCAINE HYDROCHLORIDE 100 MG: 20 INJECTION, SOLUTION EPIDURAL; INFILTRATION; INTRACAUDAL; PERINEURAL at 14:21

## 2019-12-06 RX ADMIN — PROPOFOL 150 MG: 10 INJECTION, EMULSION INTRAVENOUS at 14:21

## 2019-12-06 RX ADMIN — Medication 200 MCG: at 14:32

## 2019-12-06 RX ADMIN — CYCLOBENZAPRINE HYDROCHLORIDE 10 MG: 10 TABLET, FILM COATED ORAL at 23:09

## 2019-12-06 RX ADMIN — INSULIN LISPRO 6 UNITS: 100 INJECTION, SOLUTION INTRAVENOUS; SUBCUTANEOUS at 23:09

## 2019-12-06 RX ADMIN — Medication 10 ML: at 23:10

## 2019-12-06 RX ADMIN — SODIUM CHLORIDE: 9 INJECTION, SOLUTION INTRAVENOUS at 23:15

## 2019-12-06 RX ADMIN — GABAPENTIN 300 MG: 300 CAPSULE ORAL at 23:09

## 2019-12-06 RX ADMIN — DEXTROSE MONOHYDRATE 2 G: 50 INJECTION, SOLUTION INTRAVENOUS at 19:26

## 2019-12-06 RX ADMIN — FENTANYL CITRATE 25 MCG: 50 INJECTION, SOLUTION INTRAMUSCULAR; INTRAVENOUS at 15:39

## 2019-12-06 RX ADMIN — Medication 100 MCG: at 14:56

## 2019-12-06 RX ADMIN — ANTI-FUNGAL POWDER MICONAZOLE NITRATE TALC FREE: 1.42 POWDER TOPICAL at 17:22

## 2019-12-06 RX ADMIN — Medication 10 ML: at 09:28

## 2019-12-06 RX ADMIN — ANTI-FUNGAL POWDER MICONAZOLE NITRATE TALC FREE: 1.42 POWDER TOPICAL at 23:13

## 2019-12-06 RX ADMIN — OXYCODONE AND ACETAMINOPHEN 1 TABLET: 5; 325 TABLET ORAL at 17:22

## 2019-12-06 RX ADMIN — Medication 10 MCG: at 14:21

## 2019-12-06 RX ADMIN — ATORVASTATIN CALCIUM 40 MG: 40 TABLET, FILM COATED ORAL at 23:08

## 2019-12-06 RX ADMIN — DEXAMETHASONE SODIUM PHOSPHATE 10 MG: 10 INJECTION INTRAMUSCULAR; INTRAVENOUS at 14:28

## 2019-12-06 RX ADMIN — OXYCODONE AND ACETAMINOPHEN 1 TABLET: 5; 325 TABLET ORAL at 23:20

## 2019-12-06 ASSESSMENT — PAIN DESCRIPTION - PAIN TYPE
TYPE: SURGICAL PAIN
TYPE: SURGICAL PAIN
TYPE: ACUTE PAIN;CHRONIC PAIN;NEUROPATHIC PAIN
TYPE: ACUTE PAIN;CHRONIC PAIN
TYPE: SURGICAL PAIN
TYPE: ACUTE PAIN;CHRONIC PAIN;NEUROPATHIC PAIN
TYPE: SURGICAL PAIN

## 2019-12-06 ASSESSMENT — PULMONARY FUNCTION TESTS
PIF_VALUE: 2
PIF_VALUE: 17
PIF_VALUE: 0
PIF_VALUE: 17
PIF_VALUE: 1
PIF_VALUE: 17
PIF_VALUE: 2
PIF_VALUE: 17
PIF_VALUE: 14
PIF_VALUE: 17
PIF_VALUE: 14
PIF_VALUE: 17
PIF_VALUE: 1
PIF_VALUE: 10
PIF_VALUE: 2
PIF_VALUE: 17
PIF_VALUE: 0
PIF_VALUE: 1
PIF_VALUE: 17
PIF_VALUE: 13
PIF_VALUE: 17
PIF_VALUE: 17
PIF_VALUE: 15
PIF_VALUE: 0
PIF_VALUE: 0
PIF_VALUE: 17
PIF_VALUE: 16
PIF_VALUE: 2
PIF_VALUE: 2
PIF_VALUE: 15
PIF_VALUE: 10
PIF_VALUE: 10
PIF_VALUE: 15
PIF_VALUE: 2
PIF_VALUE: 25
PIF_VALUE: 1
PIF_VALUE: 17
PIF_VALUE: 17
PIF_VALUE: 0
PIF_VALUE: 17
PIF_VALUE: 14
PIF_VALUE: 0
PIF_VALUE: 15
PIF_VALUE: 2
PIF_VALUE: 10
PIF_VALUE: 0
PIF_VALUE: 13
PIF_VALUE: 0
PIF_VALUE: 2
PIF_VALUE: 13
PIF_VALUE: 14
PIF_VALUE: 10
PIF_VALUE: 2
PIF_VALUE: 17
PIF_VALUE: 2
PIF_VALUE: 14
PIF_VALUE: 1
PIF_VALUE: 1
PIF_VALUE: 0
PIF_VALUE: 17
PIF_VALUE: 14
PIF_VALUE: 17

## 2019-12-06 ASSESSMENT — PAIN DESCRIPTION - LOCATION
LOCATION: FOOT
LOCATION: NECK
LOCATION: FOOT
LOCATION: FOOT
LOCATION: FOOT;LEG
LOCATION: FOOT
LOCATION: FOOT
LOCATION: FOOT;LEG
LOCATION: FOOT

## 2019-12-06 ASSESSMENT — PAIN SCALES - GENERAL
PAINLEVEL_OUTOF10: 9
PAINLEVEL_OUTOF10: 10
PAINLEVEL_OUTOF10: 9
PAINLEVEL_OUTOF10: 9
PAINLEVEL_OUTOF10: 5
PAINLEVEL_OUTOF10: 9
PAINLEVEL_OUTOF10: 10
PAINLEVEL_OUTOF10: 10
PAINLEVEL_OUTOF10: 9
PAINLEVEL_OUTOF10: 5
PAINLEVEL_OUTOF10: 9

## 2019-12-06 ASSESSMENT — PAIN DESCRIPTION - ORIENTATION
ORIENTATION: RIGHT
ORIENTATION: LEFT
ORIENTATION: RIGHT
ORIENTATION: RIGHT

## 2019-12-06 ASSESSMENT — PAIN DESCRIPTION - DESCRIPTORS
DESCRIPTORS: BURNING;CONSTANT
DESCRIPTORS: BURNING;CONSTANT
DESCRIPTORS: ACHING
DESCRIPTORS: CONSTANT
DESCRIPTORS: CONSTANT

## 2019-12-06 ASSESSMENT — PAIN DESCRIPTION - FREQUENCY
FREQUENCY: CONTINUOUS

## 2019-12-06 ASSESSMENT — PAIN DESCRIPTION - PROGRESSION
CLINICAL_PROGRESSION: NOT CHANGED
CLINICAL_PROGRESSION: GRADUALLY WORSENING

## 2019-12-06 ASSESSMENT — PAIN - FUNCTIONAL ASSESSMENT
PAIN_FUNCTIONAL_ASSESSMENT: PREVENTS OR INTERFERES SOME ACTIVE ACTIVITIES AND ADLS
PAIN_FUNCTIONAL_ASSESSMENT: PREVENTS OR INTERFERES WITH MANY ACTIVE NOT PASSIVE ACTIVITIES
PAIN_FUNCTIONAL_ASSESSMENT: PREVENTS OR INTERFERES SOME ACTIVE ACTIVITIES AND ADLS
PAIN_FUNCTIONAL_ASSESSMENT: PREVENTS OR INTERFERES SOME ACTIVE ACTIVITIES AND ADLS
PAIN_FUNCTIONAL_ASSESSMENT: PREVENTS OR INTERFERES WITH MANY ACTIVE NOT PASSIVE ACTIVITIES

## 2019-12-06 ASSESSMENT — PAIN DESCRIPTION - ONSET
ONSET: ON-GOING

## 2019-12-06 ASSESSMENT — ENCOUNTER SYMPTOMS
GASTROINTESTINAL NEGATIVE: 1
RESPIRATORY NEGATIVE: 1

## 2019-12-07 VITALS
HEIGHT: 67 IN | RESPIRATION RATE: 16 BRPM | HEART RATE: 62 BPM | BODY MASS INDEX: 26.84 KG/M2 | WEIGHT: 171 LBS | OXYGEN SATURATION: 96 % | TEMPERATURE: 97.3 F | SYSTOLIC BLOOD PRESSURE: 128 MMHG | DIASTOLIC BLOOD PRESSURE: 42 MMHG

## 2019-12-07 LAB
ABSOLUTE EOS #: <0.03 K/UL (ref 0–0.44)
ABSOLUTE IMMATURE GRANULOCYTE: 0.1 K/UL (ref 0–0.3)
ABSOLUTE LYMPH #: 0.89 K/UL (ref 1.1–3.7)
ABSOLUTE MONO #: 0.27 K/UL (ref 0.1–1.2)
ANION GAP SERPL CALCULATED.3IONS-SCNC: 9 MMOL/L (ref 9–17)
BASOPHILS # BLD: 0 % (ref 0–2)
BASOPHILS ABSOLUTE: 0.04 K/UL (ref 0–0.2)
BUN BLDV-MCNC: 26 MG/DL (ref 6–20)
BUN/CREAT BLD: 31 (ref 9–20)
CALCIUM SERPL-MCNC: 9.3 MG/DL (ref 8.6–10.4)
CHLORIDE BLD-SCNC: 91 MMOL/L (ref 98–107)
CO2: 29 MMOL/L (ref 20–31)
CREAT SERPL-MCNC: 0.83 MG/DL (ref 0.5–0.9)
DIFFERENTIAL TYPE: ABNORMAL
EOSINOPHILS RELATIVE PERCENT: 0 % (ref 1–4)
GFR AFRICAN AMERICAN: >60 ML/MIN
GFR NON-AFRICAN AMERICAN: >60 ML/MIN
GFR SERPL CREATININE-BSD FRML MDRD: ABNORMAL ML/MIN/{1.73_M2}
GFR SERPL CREATININE-BSD FRML MDRD: ABNORMAL ML/MIN/{1.73_M2}
GLUCOSE BLD-MCNC: 299 MG/DL (ref 65–105)
GLUCOSE BLD-MCNC: 349 MG/DL (ref 65–105)
GLUCOSE BLD-MCNC: 357 MG/DL (ref 70–99)
GLUCOSE BLD-MCNC: 363 MG/DL (ref 65–105)
HCT VFR BLD CALC: 31.3 % (ref 36.3–47.1)
HEMOGLOBIN: 9.6 G/DL (ref 11.9–15.1)
IMMATURE GRANULOCYTES: 1 %
LYMPHOCYTES # BLD: 7 % (ref 24–43)
MCH RBC QN AUTO: 28.2 PG (ref 25.2–33.5)
MCHC RBC AUTO-ENTMCNC: 30.7 G/DL (ref 28.4–34.8)
MCV RBC AUTO: 92.1 FL (ref 82.6–102.9)
MONOCYTES # BLD: 2 % (ref 3–12)
NRBC AUTOMATED: 0 PER 100 WBC
PDW BLD-RTO: 14.4 % (ref 11.8–14.4)
PLATELET # BLD: 636 K/UL (ref 138–453)
PLATELET ESTIMATE: ABNORMAL
PMV BLD AUTO: 9.3 FL (ref 8.1–13.5)
POTASSIUM SERPL-SCNC: 4.8 MMOL/L (ref 3.7–5.3)
POTASSIUM SERPL-SCNC: 5.5 MMOL/L (ref 3.7–5.3)
RBC # BLD: 3.4 M/UL (ref 3.95–5.11)
RBC # BLD: ABNORMAL 10*6/UL
SEG NEUTROPHILS: 90 % (ref 36–65)
SEGMENTED NEUTROPHILS ABSOLUTE COUNT: 11.75 K/UL (ref 1.5–8.1)
SODIUM BLD-SCNC: 129 MMOL/L (ref 135–144)
WBC # BLD: 13.1 K/UL (ref 3.5–11.3)
WBC # BLD: ABNORMAL 10*3/UL

## 2019-12-07 PROCEDURE — 6370000000 HC RX 637 (ALT 250 FOR IP): Performed by: FAMILY MEDICINE

## 2019-12-07 PROCEDURE — 84132 ASSAY OF SERUM POTASSIUM: CPT

## 2019-12-07 PROCEDURE — 97535 SELF CARE MNGMENT TRAINING: CPT

## 2019-12-07 PROCEDURE — 2580000003 HC RX 258: Performed by: SURGERY

## 2019-12-07 PROCEDURE — 85025 COMPLETE CBC W/AUTO DIFF WBC: CPT

## 2019-12-07 PROCEDURE — 97163 PT EVAL HIGH COMPLEX 45 MIN: CPT

## 2019-12-07 PROCEDURE — 6370000000 HC RX 637 (ALT 250 FOR IP): Performed by: SURGERY

## 2019-12-07 PROCEDURE — 99232 SBSQ HOSP IP/OBS MODERATE 35: CPT | Performed by: FAMILY MEDICINE

## 2019-12-07 PROCEDURE — 36415 COLL VENOUS BLD VENIPUNCTURE: CPT

## 2019-12-07 PROCEDURE — 6360000002 HC RX W HCPCS: Performed by: SURGERY

## 2019-12-07 PROCEDURE — 97166 OT EVAL MOD COMPLEX 45 MIN: CPT

## 2019-12-07 PROCEDURE — 97530 THERAPEUTIC ACTIVITIES: CPT

## 2019-12-07 PROCEDURE — 99233 SBSQ HOSP IP/OBS HIGH 50: CPT | Performed by: INTERNAL MEDICINE

## 2019-12-07 PROCEDURE — 80048 BASIC METABOLIC PNL TOTAL CA: CPT

## 2019-12-07 PROCEDURE — 82947 ASSAY GLUCOSE BLOOD QUANT: CPT

## 2019-12-07 RX ORDER — SODIUM POLYSTYRENE SULFONATE 15 G/60ML
15 SUSPENSION ORAL; RECTAL ONCE
Status: COMPLETED | OUTPATIENT
Start: 2019-12-07 | End: 2019-12-07

## 2019-12-07 RX ORDER — INSULIN GLARGINE 100 [IU]/ML
25 INJECTION, SOLUTION SUBCUTANEOUS NIGHTLY
Status: DISCONTINUED | OUTPATIENT
Start: 2019-12-07 | End: 2019-12-07 | Stop reason: HOSPADM

## 2019-12-07 RX ADMIN — ANTI-FUNGAL POWDER MICONAZOLE NITRATE TALC FREE: 1.42 POWDER TOPICAL at 10:38

## 2019-12-07 RX ADMIN — FAMOTIDINE 20 MG: 20 TABLET ORAL at 08:37

## 2019-12-07 RX ADMIN — DEXTROSE MONOHYDRATE 2 G: 50 INJECTION, SOLUTION INTRAVENOUS at 17:55

## 2019-12-07 RX ADMIN — Medication 10 ML: at 10:37

## 2019-12-07 RX ADMIN — INSULIN LISPRO 9 UNITS: 100 INJECTION, SOLUTION INTRAVENOUS; SUBCUTANEOUS at 08:38

## 2019-12-07 RX ADMIN — LISINOPRIL AND HYDROCHLOROTHIAZIDE 1 TABLET: 25; 20 TABLET ORAL at 12:48

## 2019-12-07 RX ADMIN — OXYCODONE AND ACETAMINOPHEN 1 TABLET: 5; 325 TABLET ORAL at 10:45

## 2019-12-07 RX ADMIN — Medication 15 G: at 12:42

## 2019-12-07 RX ADMIN — ASPIRIN 81 MG: 81 TABLET, COATED ORAL at 08:37

## 2019-12-07 RX ADMIN — DEXTROSE MONOHYDRATE 2 G: 50 INJECTION, SOLUTION INTRAVENOUS at 02:45

## 2019-12-07 RX ADMIN — OXYCODONE AND ACETAMINOPHEN 1 TABLET: 5; 325 TABLET ORAL at 17:01

## 2019-12-07 RX ADMIN — DEXTROSE MONOHYDRATE 2 G: 50 INJECTION, SOLUTION INTRAVENOUS at 10:37

## 2019-12-07 RX ADMIN — INSULIN LISPRO 12 UNITS: 100 INJECTION, SOLUTION INTRAVENOUS; SUBCUTANEOUS at 17:55

## 2019-12-07 RX ADMIN — INSULIN LISPRO 15 UNITS: 100 INJECTION, SOLUTION INTRAVENOUS; SUBCUTANEOUS at 12:43

## 2019-12-07 RX ADMIN — FERROUS SULFATE TAB EC 325 MG (65 MG FE EQUIVALENT) 325 MG: 325 (65 FE) TABLET DELAYED RESPONSE at 08:37

## 2019-12-07 RX ADMIN — ENOXAPARIN SODIUM 40 MG: 40 INJECTION SUBCUTANEOUS at 08:38

## 2019-12-07 ASSESSMENT — PAIN SCALES - GENERAL
PAINLEVEL_OUTOF10: 7
PAINLEVEL_OUTOF10: 5
PAINLEVEL_OUTOF10: 8

## 2019-12-07 ASSESSMENT — PAIN DESCRIPTION - DESCRIPTORS
DESCRIPTORS: ACHING
DESCRIPTORS: ACHING

## 2019-12-07 ASSESSMENT — ENCOUNTER SYMPTOMS
COUGH: 0
SHORTNESS OF BREATH: 0
BLOOD IN STOOL: 0
CONSTIPATION: 0
RESPIRATORY NEGATIVE: 1
COLOR CHANGE: 1
WHEEZING: 0
GASTROINTESTINAL NEGATIVE: 1
ABDOMINAL PAIN: 0
VOMITING: 0
CHEST TIGHTNESS: 0
DIARRHEA: 0
NAUSEA: 0

## 2019-12-07 ASSESSMENT — PAIN - FUNCTIONAL ASSESSMENT
PAIN_FUNCTIONAL_ASSESSMENT: PREVENTS OR INTERFERES SOME ACTIVE ACTIVITIES AND ADLS
PAIN_FUNCTIONAL_ASSESSMENT: PREVENTS OR INTERFERES SOME ACTIVE ACTIVITIES AND ADLS

## 2019-12-07 ASSESSMENT — PAIN DESCRIPTION - ORIENTATION
ORIENTATION: RIGHT
ORIENTATION: RIGHT

## 2019-12-07 ASSESSMENT — PAIN DESCRIPTION - LOCATION
LOCATION: FOOT
LOCATION: FOOT

## 2019-12-07 ASSESSMENT — PAIN DESCRIPTION - ONSET
ONSET: ON-GOING
ONSET: ON-GOING

## 2019-12-07 ASSESSMENT — PAIN DESCRIPTION - PROGRESSION: CLINICAL_PROGRESSION: NOT CHANGED

## 2019-12-07 ASSESSMENT — PAIN DESCRIPTION - FREQUENCY
FREQUENCY: INTERMITTENT
FREQUENCY: INTERMITTENT

## 2019-12-08 LAB
ABO/RH: NORMAL
ANTIBODY SCREEN: NEGATIVE
ARM BAND NUMBER: NORMAL
BLD PROD TYP BPU: NORMAL
BLD PROD TYP BPU: NORMAL
CROSSMATCH RESULT: NORMAL
CROSSMATCH RESULT: NORMAL
DISPENSE STATUS BLOOD BANK: NORMAL
DISPENSE STATUS BLOOD BANK: NORMAL
EXPIRATION DATE: NORMAL
TRANSFUSION STATUS: NORMAL
TRANSFUSION STATUS: NORMAL
UNIT DIVISION: 0
UNIT DIVISION: 0
UNIT NUMBER: NORMAL
UNIT NUMBER: NORMAL

## 2019-12-09 ENCOUNTER — HOSPITAL ENCOUNTER (OUTPATIENT)
Age: 54
Setting detail: SPECIMEN
Discharge: HOME | End: 2019-12-09
Payer: COMMERCIAL

## 2019-12-09 ENCOUNTER — TELEPHONE (OUTPATIENT)
Dept: INFECTIOUS DISEASES | Age: 54
End: 2019-12-09

## 2019-12-09 LAB
ABSOLUTE EOS #: 0.2 K/UL (ref 0–0.4)
ABSOLUTE IMMATURE GRANULOCYTE: ABNORMAL K/UL (ref 0–0.3)
ABSOLUTE LYMPH #: 1.9 K/UL (ref 1–4.8)
ABSOLUTE MONO #: 0.9 K/UL (ref 0.1–1.3)
ANION GAP SERPL CALCULATED.3IONS-SCNC: 11 MMOL/L (ref 9–17)
BASOPHILS # BLD: 1 % (ref 0–2)
BASOPHILS ABSOLUTE: 0.1 K/UL (ref 0–0.2)
BUN BLDV-MCNC: 32 MG/DL (ref 6–20)
BUN/CREAT BLD: ABNORMAL (ref 9–20)
C-REACTIVE PROTEIN: 48.8 MG/L (ref 0–5)
CALCIUM SERPL-MCNC: 9.6 MG/DL (ref 8.6–10.4)
CHLORIDE BLD-SCNC: 97 MMOL/L (ref 98–107)
CO2: 29 MMOL/L (ref 20–31)
CREAT SERPL-MCNC: 0.79 MG/DL (ref 0.5–0.9)
DIFFERENTIAL TYPE: ABNORMAL
EOSINOPHILS RELATIVE PERCENT: 2 % (ref 0–4)
GFR AFRICAN AMERICAN: >60 ML/MIN
GFR NON-AFRICAN AMERICAN: >60 ML/MIN
GFR SERPL CREATININE-BSD FRML MDRD: ABNORMAL ML/MIN/{1.73_M2}
GFR SERPL CREATININE-BSD FRML MDRD: ABNORMAL ML/MIN/{1.73_M2}
GLUCOSE BLD-MCNC: 228 MG/DL (ref 70–99)
HCT VFR BLD CALC: 29.9 % (ref 36–46)
HEMOGLOBIN: 9.9 G/DL (ref 12–16)
IMMATURE GRANULOCYTES: ABNORMAL %
LYMPHOCYTES # BLD: 20 % (ref 24–44)
MCH RBC QN AUTO: 28.5 PG (ref 26–34)
MCHC RBC AUTO-ENTMCNC: 33 G/DL (ref 31–37)
MCV RBC AUTO: 86.4 FL (ref 80–100)
MONOCYTES # BLD: 9 % (ref 1–7)
NRBC AUTOMATED: ABNORMAL PER 100 WBC
PDW BLD-RTO: 15 % (ref 11.5–14.9)
PLATELET # BLD: 636 K/UL (ref 150–450)
PLATELET ESTIMATE: ABNORMAL
PMV BLD AUTO: 7.5 FL (ref 6–12)
POTASSIUM SERPL-SCNC: 4.7 MMOL/L (ref 3.7–5.3)
RBC # BLD: 3.47 M/UL (ref 4–5.2)
RBC # BLD: ABNORMAL 10*6/UL
SEG NEUTROPHILS: 68 % (ref 36–66)
SEGMENTED NEUTROPHILS ABSOLUTE COUNT: 6.7 K/UL (ref 1.3–9.1)
SODIUM BLD-SCNC: 137 MMOL/L (ref 135–144)
WBC # BLD: 9.9 K/UL (ref 3.5–11)
WBC # BLD: ABNORMAL 10*3/UL

## 2019-12-09 PROCEDURE — 80048 BASIC METABOLIC PNL TOTAL CA: CPT

## 2019-12-09 PROCEDURE — 86140 C-REACTIVE PROTEIN: CPT

## 2019-12-09 PROCEDURE — 85025 COMPLETE CBC W/AUTO DIFF WBC: CPT

## 2019-12-10 LAB — SURGICAL PATHOLOGY REPORT: NORMAL

## 2019-12-16 ENCOUNTER — HOSPITAL ENCOUNTER (OUTPATIENT)
Age: 54
Setting detail: SPECIMEN
Discharge: HOME | End: 2019-12-16
Payer: COMMERCIAL

## 2019-12-16 ENCOUNTER — TELEPHONE (OUTPATIENT)
Dept: INTERNAL MEDICINE | Age: 54
End: 2019-12-16

## 2019-12-16 LAB
ABSOLUTE EOS #: 0.4 K/UL (ref 0–0.4)
ABSOLUTE IMMATURE GRANULOCYTE: ABNORMAL K/UL (ref 0–0.3)
ABSOLUTE LYMPH #: 2.1 K/UL (ref 1–4.8)
ABSOLUTE MONO #: 0.7 K/UL (ref 0.1–1.3)
ANION GAP SERPL CALCULATED.3IONS-SCNC: 12 MMOL/L (ref 9–17)
BASOPHILS # BLD: 1 % (ref 0–2)
BASOPHILS ABSOLUTE: 0.1 K/UL (ref 0–0.2)
BUN BLDV-MCNC: 17 MG/DL (ref 6–20)
BUN/CREAT BLD: ABNORMAL (ref 9–20)
C-REACTIVE PROTEIN: 14.4 MG/L (ref 0–5)
CALCIUM SERPL-MCNC: 9.1 MG/DL (ref 8.6–10.4)
CHLORIDE BLD-SCNC: 99 MMOL/L (ref 98–107)
CO2: 27 MMOL/L (ref 20–31)
CREAT SERPL-MCNC: 0.66 MG/DL (ref 0.5–0.9)
DIFFERENTIAL TYPE: ABNORMAL
EOSINOPHILS RELATIVE PERCENT: 4 % (ref 0–4)
GFR AFRICAN AMERICAN: >60 ML/MIN
GFR NON-AFRICAN AMERICAN: >60 ML/MIN
GFR SERPL CREATININE-BSD FRML MDRD: ABNORMAL ML/MIN/{1.73_M2}
GFR SERPL CREATININE-BSD FRML MDRD: ABNORMAL ML/MIN/{1.73_M2}
GLUCOSE BLD-MCNC: 101 MG/DL (ref 70–99)
HCT VFR BLD CALC: 26.6 % (ref 36–46)
HEMOGLOBIN: 9.3 G/DL (ref 12–16)
IMMATURE GRANULOCYTES: ABNORMAL %
LYMPHOCYTES # BLD: 22 % (ref 24–44)
MCH RBC QN AUTO: 30.7 PG (ref 26–34)
MCHC RBC AUTO-ENTMCNC: 34.8 G/DL (ref 31–37)
MCV RBC AUTO: 88.2 FL (ref 80–100)
MONOCYTES # BLD: 8 % (ref 1–7)
NRBC AUTOMATED: ABNORMAL PER 100 WBC
PDW BLD-RTO: 15.8 % (ref 11.5–14.9)
PLATELET # BLD: 597 K/UL (ref 150–450)
PLATELET ESTIMATE: ABNORMAL
PMV BLD AUTO: 7.1 FL (ref 6–12)
POTASSIUM SERPL-SCNC: 4.1 MMOL/L (ref 3.7–5.3)
RBC # BLD: 3.02 M/UL (ref 4–5.2)
RBC # BLD: ABNORMAL 10*6/UL
SEG NEUTROPHILS: 65 % (ref 36–66)
SEGMENTED NEUTROPHILS ABSOLUTE COUNT: 6.1 K/UL (ref 1.3–9.1)
SODIUM BLD-SCNC: 138 MMOL/L (ref 135–144)
WBC # BLD: 9.4 K/UL (ref 3.5–11)
WBC # BLD: ABNORMAL 10*3/UL

## 2019-12-16 PROCEDURE — 80048 BASIC METABOLIC PNL TOTAL CA: CPT

## 2019-12-16 PROCEDURE — 86140 C-REACTIVE PROTEIN: CPT

## 2019-12-16 PROCEDURE — 85025 COMPLETE CBC W/AUTO DIFF WBC: CPT

## 2019-12-17 ENCOUNTER — TELEPHONE (OUTPATIENT)
Dept: INTERNAL MEDICINE | Age: 54
End: 2019-12-17

## 2019-12-17 ENCOUNTER — OFFICE VISIT (OUTPATIENT)
Dept: INTERNAL MEDICINE | Age: 54
End: 2019-12-17
Payer: COMMERCIAL

## 2019-12-17 VITALS
SYSTOLIC BLOOD PRESSURE: 125 MMHG | DIASTOLIC BLOOD PRESSURE: 69 MMHG | HEIGHT: 67 IN | HEART RATE: 71 BPM | WEIGHT: 167 LBS | BODY MASS INDEX: 26.21 KG/M2

## 2019-12-17 PROCEDURE — 99213 OFFICE O/P EST LOW 20 MIN: CPT | Performed by: STUDENT IN AN ORGANIZED HEALTH CARE EDUCATION/TRAINING PROGRAM

## 2019-12-17 PROCEDURE — 90688 IIV4 VACCINE SPLT 0.5 ML IM: CPT | Performed by: STUDENT IN AN ORGANIZED HEALTH CARE EDUCATION/TRAINING PROGRAM

## 2019-12-17 PROCEDURE — 99211 OFF/OP EST MAY X REQ PHY/QHP: CPT | Performed by: INTERNAL MEDICINE

## 2019-12-17 RX ORDER — DOCUSATE SODIUM 100 MG/1
100 CAPSULE, LIQUID FILLED ORAL 2 TIMES DAILY PRN
Qty: 60 CAPSULE | Refills: 0 | Status: SHIPPED | OUTPATIENT
Start: 2019-12-17 | End: 2021-09-07

## 2019-12-17 RX ORDER — DOCUSATE SODIUM 100 MG/1
200 CAPSULE, LIQUID FILLED ORAL 2 TIMES DAILY PRN
Qty: 60 CAPSULE | Refills: 0 | Status: SHIPPED | OUTPATIENT
Start: 2019-12-17 | End: 2019-12-17 | Stop reason: ALTCHOICE

## 2019-12-17 RX ORDER — TRAMADOL HYDROCHLORIDE 50 MG/1
50 TABLET ORAL EVERY 8 HOURS PRN
Qty: 15 TABLET | Refills: 0 | Status: SHIPPED | OUTPATIENT
Start: 2019-12-17 | End: 2019-12-20

## 2019-12-17 NOTE — PATIENT INSTRUCTIONS
Medications e-scribe to pharmacy of pt's choice. Printed and signed script for Tramadol given to pt. Script for lab given to pt, no fasting required. Pt will get labs done as soon as possible. An order for cologuard was placed by your physician, the company will be contacting within the next 2 days and will mail the test. Please contact the clinic at [phone] if not heard from in 1 week   Patient to return to clinic as needed  AVS reviewed and given to pt. It is very important for your care that you keep your appointment. If for some reason you are unable to keep your appointment it is equally important that you call our office at [phone] to cancel your appointment and reschedule. Failure to do so may result in your termination from our practice. MB      HEART/VASCULAR INSTRUCTIONS    Your doctor has ordered a/an Stress Test for you, this will be done at the [facility] located at [address] [place] [address] [facility] from [facility]. On the day of your appointment, please report to Registration on the main floor of the [facility], 20 minutes before your test to complete any needed paperwork. Day:      Date:        Time:    If you cannot keep this appointment, please call [phone] to cancel and reschedule your appointment.

## 2019-12-17 NOTE — PROGRESS NOTES
Visit Information    Have you changed or started any medications since your last visit including any over-the-counter medicines, vitamins, or herbal medicines? no   Have you stopped taking any of your medications? Is so, why? -  no  Are you having any side effects from any of your medications? - no    Have you seen any other physician or provider since your last visit?  no   Have you had any other diagnostic tests since your last visit?  no   Have you been seen in the emergency room and/or had an admission in a hospital since we last saw you?  no   Have you had your routine dental cleaning in the past 6 months?  no     Do you have an active MyChart account? If no, what is the barrier?   Yes    Patient Care Team:  Donis Agustin MD as PCP - General (Internal Medicine)  Donis Agustin MD as PCP - [facility] Provider  Chuck Moreno MD as Consulting Physician (Internal Medicine Cardiovascular Disease)    Medical History Review  Past Medical, Family, and Social History reviewed and does contribute to the patient presenting condition    Health Maintenance   Topic Date Due    Hepatitis B vaccine (1 of 3 - Risk 3-dose series) 01/01/1985    Shingles Vaccine (1 of 2) 01/01/2016    Colon cancer screen colonoscopy  01/01/2016    Diabetic retinal exam  12/02/2016    Breast cancer screen  12/02/2017    Cervical cancer screen  12/02/2018    Diabetic microalbuminuria test  03/15/2019    Flu vaccine (1) 09/01/2019    A1C test (Diabetic or Prediabetic)  02/25/2020    Lipid screen  07/11/2020    Diabetic foot exam  07/30/2020    Potassium monitoring  12/16/2020    Creatinine monitoring  12/16/2020    DTaP/Tdap/Td vaccine (2 - Td) 08/13/2025    Pneumococcal 0-64 years Vaccine  Completed    HIV screen  Completed

## 2019-12-17 NOTE — PROGRESS NOTES
[facility] IM [address] [address]  Dept: [phone]  Dept Fax: [phone]    Office Progress/Follow Up Note  Date ofpatient's visit: 12/17/2019  Patient's Name:  Sunny Singh YOB: 1965            Patient Care Team:  Saint Blossom, MD as PCP - General (Internal Medicine)  Saint Blossom, MD as PCP - [facility]  Yandy Han MD as Consulting Physician (Internal Medicine Cardiovascular Disease)  ================================================================    REASON FOR VISIT/CHIEF COMPLAINT:  Abdominal Pain; Procedure (surgery clearance); and Health Maintenance (flu pend, pt declined shingrix, pt states she going to make appt soon for a eye exam, pt states she going to [facility] a appt soon for pap smear)    HISTORY OF PRESENTING ILLNESS:  52-year-old female is here for the surgical clearance, she already have right-sidedl foot debridements and plan for another 1, she was sent per her podiatrist to have a surgical clearance for cardiac standpoint and she has a CAD and elevated enzymes while hospital admission. Cardiology was consulted at that time, likely because of sepsis, echocardiogram did not show any wall motion abnormalities or decreased EF. Patient was recently discharged from hospital after she she was admitted with neck pain and stiffness and severe anemia of 5.5 found to have MSSA but uremia, infectious disease was consulted and patient was started on cefazolin 2 g every 8 hours with expected stop date 12/23/2019. Patient was seen by neurosurgery for concern of osteomyelitis of neck vertebra however no intervention was recommended.   And the pain improved with medication, patient was transferred to [facility] where she had excisional debridement of right tarsal metatarsal wound of fourth and fifth toe, debridement of skin  Last hemoglobin stable 9.3  His blood pressure in office is 143/70 with repeat blood pressure 127/69, with heart rate 70s  Significant past medical history include   · Diabetes mellitus on metformin thousand milligrams twice daily  · Hypertension on lisinopril/HCT CTZ 20/25, atorvastatin  · CAD, status post stenting in 2011, on aspirin and Plavix  · Severe PVD status post right femoral-popliteal grafting, resection of third and fourth and fifth toe on right side.     Former smoker, do not drink alcohol      Patient Active Problem List   Diagnosis    Type 2 diabetes mellitus, with long-term current use of insulin ([address]    Essential hypertension    GERD (gastroesophageal reflux disease)    Hyperlipidemia    Claudication of lower extremity (HCC)    Diabetes mellitus ([address]    Panic attack    Leg pain, diffuse    Coronary artery disease involving native heart without angina pectoris    Diabetic polyneuropathy associated with type 2 diabetes mellitus ([address]    Neuropathy    Diabetic ulcer of right midfoot associated with type 2 diabetes mellitus ([address]    Ischemic ulcer of toe of right foot, limited to breakdown of skin ([address]    Thrombocytosis (HCC)    Anemia    Paraspinal abscess (HCC)    Neck pain    Penetrating foot wound, right, subsequent encounter    MSSA bacteremia    Dyslipidemia       Health Maintenance Due   Topic Date Due    Hepatitis B vaccine (1 of 3 - Risk 3-dose series) 01/01/1985    Shingles Vaccine (1 of 2) 01/01/2016    Colon cancer screen colonoscopy  01/01/2016    Diabetic retinal exam  12/02/2016    Breast cancer screen  12/02/2017    Cervical cancer screen  12/02/2018    Diabetic microalbuminuria test  03/15/2019    Flu vaccine (1) 09/01/2019       No Known Allergies      Current Outpatient Medications   Medication Sig Dispense Refill    ceFAZolin (ANCEF) infusion Infuse 2,000 mg intravenously every 8 hours for 16 days Compound per protocol 96 g 0    ibuprofen (ADVIL;MOTRIN) 800 MG tablet Take 1 tablet by mouth every 8 hours as needed for Pain 30 tablet 0  insulin glargine (LANTUS SOLOSTAR) 100 UNIT/ML injection pen Inject 20 Units into the skin nightly 5 pen 3    lisinopril-hydrochlorothiazide (PRINZIDE;ZESTORETIC) 20-25 MG per tablet Take 1 tablet by mouth daily 30 tablet 5    metFORMIN (GLUCOPHAGE) 1000 MG tablet Take 1 tablet by mouth 2 times daily (with meals) 60 tablet 5    atorvastatin (LIPITOR) 40 MG tablet Take 1 tablet by mouth daily 30 tablet 6    aspirin 81 MG tablet Take 81 mg by mouth daily.  ferrous sulfate 325 (65 Fe) MG EC tablet Take 1 tablet by mouth daily (with breakfast) 90 tablet 3    gabapentin (NEURONTIN) 300 MG capsule take 1 capsule by mouth three times a day 90 capsule 5    clopidogrel (PLAVIX) 75 MG tablet   0     No current facility-administered medications for this visit. Social History     Tobacco Use    Smoking status: Former Smoker     Packs/day: 0.00     Years: 15.00     Pack years: 0.00     Types: Cigarettes     Last attempt to quit: 12/19/2015     Years since quitting: 3.9    Smokeless tobacco: Former User    Tobacco comment: non smoker   Substance Use Topics    Alcohol use: No    Drug use: No       Family History   Problem Relation Age of Onset    Cancer Mother     Colon Cancer Mother     Cancer Father     High Blood Pressure Father     Cancer Brother         REVIEW OF SYSTEMS:  Review of Systems  · Constitutional: Negative for Fever, chills  · Eyes: Negative for visual changes, diplopia  · ENT: Negative for mouth sores, sore throat. · Cardiovascular: Negative for lightheadedness ,chest pain, palpitations   · Respiratory:Negative for Shortness of breath,cough or wheezing. · Gastrointestinal: Negative for nausea/vomiting,, positive for constipation  · Genitourinary:Negative for change in bladder habits, dysuria, hematuria.   · Musculoskeletal: Negative for joint pain, right foot pain  · Neurological: Negative for headache, change in muscle strength numbness/tingling  · Psychiatric: negative for stenting    Continue cefazolin 2 g 3 times daily IV infusion per ID recommendations, will follow for ID  recommed stopping aspirin and Plavix for a week after surgery to prevent bleeding  Continue Lantus and do not take metformin on the day of surgery  Continue taking antihypertensive medications . history of CAD status post stenting, during admission the troponin elevated likely because of sepsis, echocardiogram did not show any wall motion abnormalities or decreased EF, currently patient do not have any symptoms of stable angina or heart failure Patient is clear for debridement/general anesthesia with moderate risk of cardiac events. Patient will need cardiology follow-up     Healthcare maintenance  Flu vaccine given  Denied Shingrix, and Pap smear at this point of time    FOLLOW UP AND INSTRUCTIONS:  · No follow-ups on file. · Sirisha Parsons received counseling on the following healthy behaviors: medication adherence    · Discussed use, benefit, and side effects of prescribed medications. Barriers to medication compliance addressed. All patient questions answered. Pt voiced understanding. · Patient given educational materials - see patient instructions        Margarita Mann MD  PGY-3, Internal Medicine resident  [facility]. 12/17/2019, 4:05 PM    This note is created with the assistance of a speech-recognition program. While intending to generate a document that actually reflects the content of thevisit, the document can still have some mistakes which may not have been identified and corrected by editing. Pre op clearance for right foot debridement   Patient has h/o CAD with PCI in 2011 on aspirin and plavix. No signs of angina however developed mild elevation of troponin during her last hospital admission      Plan :   Recommend  a stress test to evaluate CAD on account of recent elevated troponin levels in November .      Hypertension is well controlled . Patient is advised to continue all her medications on the day of surgery   To stop taking metformin the day of surgery and to be resumed only after office visit with us   To stop taking aspirin and plavix for at least 1 week prior to and after the surgery .      She is at moderate risk for cardiovascular event        Robyn Ward MD  Faculty Internal Medicine/ Nephrology

## 2019-12-23 ENCOUNTER — TELEPHONE (OUTPATIENT)
Dept: INFECTIOUS DISEASES | Age: 54
End: 2019-12-23

## 2019-12-30 ENCOUNTER — HOSPITAL ENCOUNTER (OUTPATIENT)
Age: 54
Setting detail: SPECIMEN
Discharge: HOME | End: 2019-12-30
Payer: COMMERCIAL

## 2019-12-30 LAB
ABSOLUTE EOS #: 0.2 K/UL (ref 0–0.4)
ABSOLUTE IMMATURE GRANULOCYTE: ABNORMAL K/UL (ref 0–0.3)
ABSOLUTE LYMPH #: 1.9 K/UL (ref 1–4.8)
ABSOLUTE MONO #: 0.6 K/UL (ref 0.1–1.3)
ANION GAP SERPL CALCULATED.3IONS-SCNC: 13 MMOL/L (ref 9–17)
BASOPHILS # BLD: 1 % (ref 0–2)
BASOPHILS ABSOLUTE: 0.1 K/UL (ref 0–0.2)
BUN BLDV-MCNC: 17 MG/DL (ref 6–20)
BUN/CREAT BLD: ABNORMAL (ref 9–20)
C-REACTIVE PROTEIN: 35 MG/L (ref 0–5)
CALCIUM SERPL-MCNC: 9.2 MG/DL (ref 8.6–10.4)
CHLORIDE BLD-SCNC: 99 MMOL/L (ref 98–107)
CO2: 25 MMOL/L (ref 20–31)
CREAT SERPL-MCNC: 1 MG/DL (ref 0.5–0.9)
DIFFERENTIAL TYPE: ABNORMAL
EOSINOPHILS RELATIVE PERCENT: 3 % (ref 0–4)
GFR AFRICAN AMERICAN: >60 ML/MIN
GFR NON-AFRICAN AMERICAN: 58 ML/MIN
GFR SERPL CREATININE-BSD FRML MDRD: ABNORMAL ML/MIN/{1.73_M2}
GFR SERPL CREATININE-BSD FRML MDRD: ABNORMAL ML/MIN/{1.73_M2}
GLUCOSE BLD-MCNC: 89 MG/DL (ref 70–99)
HCT VFR BLD CALC: 29.7 % (ref 36–46)
HEMOGLOBIN: 10 G/DL (ref 12–16)
IMMATURE GRANULOCYTES: ABNORMAL %
LYMPHOCYTES # BLD: 24 % (ref 24–44)
MCH RBC QN AUTO: 30.5 PG (ref 26–34)
MCHC RBC AUTO-ENTMCNC: 33.7 G/DL (ref 31–37)
MCV RBC AUTO: 90.5 FL (ref 80–100)
MONOCYTES # BLD: 7 % (ref 1–7)
NRBC AUTOMATED: ABNORMAL PER 100 WBC
PDW BLD-RTO: 16.4 % (ref 11.5–14.9)
PLATELET # BLD: 471 K/UL (ref 150–450)
PLATELET ESTIMATE: ABNORMAL
PMV BLD AUTO: 7 FL (ref 6–12)
POTASSIUM SERPL-SCNC: 5.6 MMOL/L (ref 3.7–5.3)
RBC # BLD: 3.28 M/UL (ref 4–5.2)
RBC # BLD: ABNORMAL 10*6/UL
SEG NEUTROPHILS: 65 % (ref 36–66)
SEGMENTED NEUTROPHILS ABSOLUTE COUNT: 5 K/UL (ref 1.3–9.1)
SODIUM BLD-SCNC: 137 MMOL/L (ref 135–144)
WBC # BLD: 7.8 K/UL (ref 3.5–11)
WBC # BLD: ABNORMAL 10*3/UL

## 2019-12-30 PROCEDURE — 36415 COLL VENOUS BLD VENIPUNCTURE: CPT

## 2019-12-30 PROCEDURE — 86140 C-REACTIVE PROTEIN: CPT

## 2019-12-30 PROCEDURE — 85025 COMPLETE CBC W/AUTO DIFF WBC: CPT

## 2019-12-30 PROCEDURE — 80048 BASIC METABOLIC PNL TOTAL CA: CPT

## 2020-01-01 ENCOUNTER — HOSPITAL ENCOUNTER (EMERGENCY)
Age: 55
Discharge: HOME | End: 2020-01-01
Attending: EMERGENCY MEDICINE
Payer: COMMERCIAL

## 2020-01-01 ENCOUNTER — APPOINTMENT (OUTPATIENT)
Dept: GENERAL RADIOLOGY | Age: 55
End: 2020-01-01
Payer: COMMERCIAL

## 2020-01-01 VITALS
WEIGHT: 160 LBS | SYSTOLIC BLOOD PRESSURE: 169 MMHG | HEIGHT: 65 IN | HEART RATE: 101 BPM | OXYGEN SATURATION: 98 % | TEMPERATURE: 96.8 F | BODY MASS INDEX: 26.66 KG/M2 | RESPIRATION RATE: 16 BRPM | DIASTOLIC BLOOD PRESSURE: 89 MMHG

## 2020-01-01 PROCEDURE — 96374 THER/PROPH/DIAG INJ IV PUSH: CPT

## 2020-01-01 PROCEDURE — 99283 EMERGENCY DEPT VISIT LOW MDM: CPT

## 2020-01-01 PROCEDURE — 96375 TX/PRO/DX INJ NEW DRUG ADDON: CPT

## 2020-01-01 PROCEDURE — 74022 RADEX COMPL AQT ABD SERIES: CPT

## 2020-01-01 PROCEDURE — 6370000000 HC RX 637 (ALT 250 FOR IP): Performed by: STUDENT IN AN ORGANIZED HEALTH CARE EDUCATION/TRAINING PROGRAM

## 2020-01-01 PROCEDURE — 6360000002 HC RX W HCPCS: Performed by: STUDENT IN AN ORGANIZED HEALTH CARE EDUCATION/TRAINING PROGRAM

## 2020-01-01 RX ORDER — FENTANYL CITRATE 50 UG/ML
25 INJECTION, SOLUTION INTRAMUSCULAR; INTRAVENOUS ONCE
Status: COMPLETED | OUTPATIENT
Start: 2020-01-01 | End: 2020-01-01

## 2020-01-01 RX ORDER — MIDAZOLAM HYDROCHLORIDE 1 MG/ML
0.5 INJECTION INTRAMUSCULAR; INTRAVENOUS ONCE
Status: COMPLETED | OUTPATIENT
Start: 2020-01-01 | End: 2020-01-01

## 2020-01-01 RX ORDER — BISACODYL 10 MG
10 SUPPOSITORY, RECTAL RECTAL DAILY PRN
Qty: 60 SUPPOSITORY | Refills: 0 | Status: SHIPPED | OUTPATIENT
Start: 2020-01-01 | End: 2020-01-31

## 2020-01-01 RX ORDER — BISACODYL 10 MG
10 SUPPOSITORY, RECTAL RECTAL DAILY PRN
Status: DISCONTINUED | OUTPATIENT
Start: 2020-01-01 | End: 2020-01-01

## 2020-01-01 RX ADMIN — MIDAZOLAM HYDROCHLORIDE 0.5 MG: 1 INJECTION, SOLUTION INTRAMUSCULAR; INTRAVENOUS at 20:50

## 2020-01-01 RX ADMIN — FENTANYL CITRATE 25 MCG: 50 INJECTION, SOLUTION INTRAMUSCULAR; INTRAVENOUS at 20:39

## 2020-01-01 RX ADMIN — MAGNESIUM CITRATE 296 ML: 1.75 LIQUID ORAL at 18:02

## 2020-01-01 ASSESSMENT — PAIN DESCRIPTION - ORIENTATION: ORIENTATION: RIGHT

## 2020-01-01 ASSESSMENT — PAIN DESCRIPTION - DESCRIPTORS: DESCRIPTORS: CONSTANT

## 2020-01-01 ASSESSMENT — PAIN DESCRIPTION - LOCATION: LOCATION: FOOT

## 2020-01-01 ASSESSMENT — PAIN SCALES - GENERAL: PAINLEVEL_OUTOF10: 5

## 2020-01-01 ASSESSMENT — PAIN DESCRIPTION - ONSET: ONSET: ON-GOING

## 2020-01-01 ASSESSMENT — PAIN DESCRIPTION - PROGRESSION: CLINICAL_PROGRESSION: NOT CHANGED

## 2020-01-01 ASSESSMENT — PAIN - FUNCTIONAL ASSESSMENT: PAIN_FUNCTIONAL_ASSESSMENT: ACTIVITIES ARE NOT PREVENTED

## 2020-01-01 ASSESSMENT — PAIN DESCRIPTION - PAIN TYPE: TYPE: ACUTE PAIN

## 2020-01-01 ASSESSMENT — PAIN DESCRIPTION - FREQUENCY: FREQUENCY: CONTINUOUS

## 2020-01-01 NOTE — ED PROVIDER NOTES
[address] ED  Emergency Department Encounter  Emergency Medicine Resident     Pt Name: Ade Rodriguez  MRN: [id number]  Kathryngfwilfredo 1965  Date of evaluation: 20  PCP:  Kristal Gordillo MD    [address] [place] [address]       Chief Complaint   Patient presents with    Constipation       HISTORY Annelise  (Location/Symptom, Timing/Onset, Context/Setting, Quality, Duration, Modifying Factors,Severity.)      Ade Rodriguez is a 48 y.o. female who presents with concern with constipation. Patient states that she has not had a bowel movement that is full in 2 weeks. Patient states that she has liquidy stool, feels as if she has to have a BM and does not go. Patient claims that she has tried everything including suppositories, milk of magnesia, mag citrate, and cannot tolerate enemas. Patient cannot give me specific doses or how often she is tried these. Patient states that she used to take opioid pain medication for different surgeries, and she is recently coming out of surgery and having difficulty ambulating as she had below the knee toe amputations. Patient denies any abdominal pain, chest pain, shortness of breath, nausea, vomiting, fever, chills. Patient states that her only abdominal surgical history is 2 C-sections. PAST MEDICAL / SURGICAL / SOCIAL / FAMILY HISTORY      has a past medical history of CAD (coronary artery disease), Diabetes mellitus ([address] Hyperlipidemia, Hypertension, and Type II or unspecified type diabetes mellitus without mention of complication, not stated as uncontrolled. has a past surgical history that includes  section (, ); Cardiac surgery; vascular surgery; Foot surgery (Right, 2019); Toe amputation (Right, 2019); and Leg Surgery (Right, 2019).      Social History     Socioeconomic History    Marital status:      Spouse name: 2    Number of children: Not on file    Years of education: Not on file    Highest 12/17/19  Yes Maxime Gimenez MD   docusate sodium (COLACE) 100 MG capsule Take 1 capsule by mouth 2 times daily as needed for Constipation 12/17/19  Yes Maxime Gimenez MD   ferrous sulfate 325 (65 Fe) MG EC tablet Take 1 tablet by mouth daily (with breakfast) 12/4/19  Yes Justice Deleon MD   ibuprofen (ADVIL;MOTRIN) 800 MG tablet Take 1 tablet by mouth every 8 hours as needed for Pain 11/24/19  Yes Rimma Navarro MD   lisinopril-hydrochlorothiazide (PRINZIDE;ZESTORETIC) 20-25 MG per tablet Take 1 tablet by mouth daily 7/30/19  Yes Rissa Gillis MD   metFORMIN (GLUCOPHAGE) 1000 MG tablet Take 1 tablet by mouth 2 times daily (with meals) 3/5/19  Yes Hazel Contreras MD   clopidogrel (PLAVIX) 75 MG tablet  12/2/17  Yes Historical Provider, MD   atorvastatin (LIPITOR) 40 MG tablet Take 1 tablet by mouth daily 8/13/15  Yes Hazel Contreras MD   aspirin 81 MG tablet Take 81 mg by mouth daily. Yes Historical Provider, MD   gabapentin (NEURONTIN) 300 MG capsule take 1 capsule by mouth three times a day 8/9/19 9/8/19  Hazel Contreras MD       REVIEW OFSYSTEMS    (2-9 systems for level 4, 10 or more for level 5)      Constitutional ROS - No recent fevers, No recent chills  Neurological ROS - No Headache, No Syncope  Opthalmologic ROS- No eye pain, No vision changes   ENT ROS - No sore throat, No congestion  Respiratory ROS - No cough, No shortness of breath  Cardiovascular ROS - No chest pain, No palpitations   Gastrointestinal ROS - No abdominal pain, No nausea, No vomiting, +constipation   Genito-Urinary ROS - No dysuria, Nohematuria  Musculoskeletal ROS - No back pain, No neck pain  Dermatological ROS - No wound, No rash  PHYSICAL EXAM   (up to 7 for level 4, 8 or more forlevel 5)      INITIAL VITALS:   ED Triage Vitals   BP Temp Temp src Pulse Resp SpO2 Height Weight   -- -- -- -- -- -- -- --       Physical Exam  Constitutional:       Appearance: She is well-developed.    HENT: of laxatives, with no relief. Patient does not give a specific schedule of how she attempted this. We will plan for a abdominal upright series x-ray with attempt of milk of molasses enema and mag citrate laxative. DIAGNOSTIC RESULTS / EMERGENCYDEPARTMENT COURSE / MDM     LABS:  Labs Reviewed - No data to display      RADIOLOGY:  Xr Acute Abd Series Chest 1 Vw    Result Date: 1/1/2020  EXAMINATION: TWO XRAY VIEWS OF THE ABDOMEN AND SINGLE  XRAY VIEW OF THE CHEST 1/1/2020 5:03 pm COMPARISON: None. HISTORY: ORDERING SYSTEM PROVIDED HISTORY: no BM TECHNOLOGIST PROVIDED HISTORY: no BM FINDINGS: The lungs are clear. No pneumothorax or pleural effusion is seen. Cardiac and mediastinal contours are normal. There is no subdiaphragmatic free air. Nonspecific, nonobstructive bowel gas pattern is present with a large amount of stool throughout the colon. No pathologic calcification is evident. Pelvic surgical clips are present. No acute osseous abnormality is seen. 1. No acute cardiopulmonary abnormality. 2. No bowel obstruction or free air. 3. Large amount of stool throughout the colon suggesting constipation. EKG  NA     All EKG's are interpreted by the Emergency Department Physicianwho either signs or Co-signs this chart in the absence of a cardiologist.    EMERGENCY DEPARTMENT COURSE:  ED Course as of Jan 02 0106 Wed Jan 01, 2020   1741 Rectal exam was performed at bedside, patient did have a palpable stool ball in her distal rectum. We will plan to give milk of molasses enema and mag citrate p.o. Will place a bedside commode in patient's room. [NORMA]   1952 Patient did not have bowel movement after milk of molasses enema and mag citrate. Will do a bedside fecal disimpaction in the emergency department. [NORMA]   2100 Bedside fecal disimpaction was performed, a small ball of stool was removed from patient's rectum. Patient wanted to see his procedure.   Patient was discharged home with GoLYTELY,

## 2020-01-02 NOTE — ED NOTES
Pt sitting in bedside commode. States that she feels like she is going to pass out. She states that she doesn't want to be moved to bed. Pt and significant other told to not move patient from commode to bed without the assistance from the RN.  Patient and significant other verbalized understanding      Mario Alberto Serrano RN  01/01/20 2001

## 2020-01-06 NOTE — PROGRESS NOTES
[facility] [place] - Office Progress Note  Today's Date and Time: 1/6/2020, 5:32 PM    Diagnostic Impression :     1. MSSA bacteremia    2. Paraspinal abscess ([facility]    3. Diabetic polyneuropathy associated with type 2 diabetes mellitus ([facility]    4. Ischemic ulcer of toe of right foot, limited to breakdown of skin ([facility]    5. Neuropathy        Recommendations   · Cefazolin complete 12-23-19  · Keflex 500 mg po qid for chronic suppression. No stop date  · Wound care     Chief complaint/reason for consultation:     Chief Complaint   Patient presents with    Follow-up     Diabetic foot ulcer       History of Present Illness:   Kristopher Toscano is a 48y.o.-year-old  female who was evaluated on 1/7/2020. Patient seen at the request of Dr Gilles Massey.     INITIAL HISTORY:     Patient with past Hx of DM 2, CAD with prior MI angioplasty and placement of 2 stents on 2011. She continues to smoke despite above issues.     She has severe PVD with prior Rt fem Pop by Dr Jean Toney. Has undergone care under him since 2016 with numerous studies.     Recent surgery at [facility] by Dr Jean Toney with resection of 3rd, 4th and 5th toes and subsequent application of Apligraft 9-19-19. There are orders not to disturb the Rt foot dressings because of graft. Patient also has two small leg non-healing ulcers since fem-pop.     Patient admitted with neck pain. She is unable to walk upstairs to her bedroom . Has been sleeping on her couch with development of neck pain as a result of her neck position on the couch. Admitted with neck pain. Concerns with meningitis. LP done which shows mild traumatic tap. There is no clinical evidence of meningitis or encephalitis.     Patient shows Lt neck muscle inflammatory changes suggestive of myositis.      Will evaluate wounds with Dr Jean Toney. Continue wound care as per Dr Sixto Castañeda.   Patient was seen by Dr. Jean Toney and was transferred to [facility] to undergo foot.  · Has completed IV antibiotics. Continues wound care and VAC. · Placed on po Keflex for suppression until wounds start to heal.    PLAN:  · Cefazolin complete 12-23-19  · Keflex 500 mg po qid for chronic suppression. No stop date  · Wound care     Discussed with patient, family. Patient seen face to face for a period of 30 min, of which more than 50% of the time was spent on counseling, explanation of diagnosis, planning of further management, and answering all questions  . I have personally reviewed the past medical history, past surgical history, medications, social history, and family history, and I have updated the database accordingly. Past Medical History:     Past Medical History:   Diagnosis Date    CAD (coronary artery disease)     Diabetes mellitus ([facility]     Hyperlipidemia     Hypertension     Type II or unspecified type diabetes mellitus without mention of complication, not stated as uncontrolled        Past Surgical  History:     Past Surgical History:   Procedure Laterality Date    CARDIAC SURGERY      Two Stents placed   [address] Right 12/06/2019    RIGHT FOOT   INCISION AND DRAINAGE WITH WOUND VAC APPLICATION (    LEG SURGERY Right 12/6/2019    RIGHT FOOT   INCISION AND DRAINAGE WITH WOUND VAC APPLICATION performed by Wild Freeman MD at [facility]  Right 09/2019    4th and 5th.  Aye Mcgrath with dr Jhony Arroyo      PVD, fem pop with dr Florentin Araujo       Medications:     Current Outpatient Medications:     bisacodyl (DULCOLAX) 10 MG suppository, Place 1 suppository rectally daily as needed for Constipation, Disp: 60 suppository, Rfl: 0    insulin glargine (LANTUS SOLOSTAR) 100 UNIT/ML injection pen, Inject 20 Units into the skin nightly, Disp: 5 pen, Rfl: 3    docusate sodium (COLACE) 100 MG capsule, Take 1 capsule by mouth 2 times daily as needed for Constipation, Disp: 60 capsule, Rfl: 0    ferrous Active member of club or organization: Not on file     Attends meetings of clubs or organizations: Not on file     Relationship status: Not on file    Intimate partner violence:     Fear of current or ex partner: Not on file     Emotionally abused: Not on file     Physically abused: Not on file     Forced sexual activity: Not on file   Other Topics Concern    Not on file   Social History Narrative    Not on file       Family History:     Family History   Problem Relation Age of Onset    Cancer Mother     Colon Cancer Mother     Cancer Father     High Blood Pressure Father     Cancer Brother         Allergies:   Patient has no known allergies. Review of Systems:   Constitutional: No fevers or chills. No systemic complaints  Head: No headaches  Eyes: No double vision or blurry vision. ENT: No sore throat or runny nose. . No hearing loss, tinnitus or vertigo. Cardiovascular: No chest pain or palpitations. No shortness of breath. No MIRANDA  Lung: No shortness of breath or cough. No sputum production  Abdomen: No nausea, vomiting, diarrhea, or abdominal pain. .  Genitourinary: No increased urinary frequency, or dysuria. No hematuria. No suprapubic or CVA pain  Musculoskeletal: No muscle aches or pains. No joint effusions, swelling or deformities  Hematologic: No bleeding or bruising. Neurologic: No headache, weakness, numbness, or tingling. Physical Examination :   BP 94/60 (Site: Left Upper Arm, Position: Sitting, Cuff Size: Medium Adult)   Pulse 97   Temp 98.5 °F (36.9 °C) (Oral)   Resp 18   Ht 5' 7\" (1.702 m)   Wt 165 lb (74.8 kg)   SpO2 98% Comment: room air at rest  BMI 25.84 kg/m²    General Appearance: Awake, alert, and in no apparent distress  Head:  Normocephalic, no trauma  Eyes: Pupils equal, round, reactive, to light and accommodation; extraocular movements intact; sclera anicteric; conjunctivae pink. No embolic phenomena. ENT: Oropharynx clear, without erythema, exudate, or thrush.  No tenderness of sinuses. Mouth/throat: mucosa pink and moist. No lesions. Dentition in good repair. Neck:Supple, without lymphadenopathy. Thyroid normal, No bruits. Pulmonary/Chest: Clear to auscultation, without wheezes, rales, or rhonchi. No dullness to percussion. Cardiovascular: Regular rate and rhythm without murmurs, rubs, or gallops. Abdomen: Soft, non tender. Bowel sounds normal. No organomegaly  All four Extremities: No clubbing, edema, or effusions. Rt foot with open medial and lateral wounds. Rt leg wound. Neurologic: No gross motor deficits. Peripheral neuropathy. Skin: Warm and dry with good turgor. Signs of peripheral arterial insufficiency.     Medical Decision Making:   I have independently reviewed/ordered the following labs:    CBC with Differential:   Lab Results   Component Value Date    WBC 7.8 12/30/2019    WBC 9.4 12/16/2019    HGB 10.0 12/30/2019    HGB 9.3 12/16/2019    HCT 29.7 12/30/2019    HCT 26.6 12/16/2019     12/30/2019     12/16/2019     05/26/2012     09/03/2011    LYMPHOPCT 24 12/30/2019    LYMPHOPCT 22 12/16/2019    MONOPCT 7 12/30/2019    MONOPCT 8 12/16/2019     BMP:   Lab Results   Component Value Date     12/30/2019     12/16/2019    K 5.6 12/30/2019    K 4.1 12/16/2019    CL 99 12/30/2019    CL 99 12/16/2019    CO2 25 12/30/2019    CO2 27 12/16/2019    BUN 17 12/30/2019    BUN 17 12/16/2019    CREATININE 1.00 12/30/2019    CREATININE 0.66 12/16/2019    MG 1.8 12/03/2019    MG 2.1 09/02/2011     Hepatic Function Panel:   Lab Results   Component Value Date    PROT 5.6 11/26/2019    PROT 6.7 11/24/2019    LABALBU 1.8 11/26/2019    LABALBU 2.3 11/24/2019    BILIDIR 0.09 11/26/2019    IBILI 0.08 11/26/2019    BILITOT 0.17 11/26/2019    BILITOT 0.38 11/24/2019    ALKPHOS 125 11/26/2019    ALKPHOS 198 11/24/2019    ALT 6 11/26/2019    ALT 7 11/24/2019    AST <5 11/26/2019    AST <5 11/24/2019     No results found for: RPR  No results found

## 2020-01-07 ENCOUNTER — OFFICE VISIT (OUTPATIENT)
Dept: INFECTIOUS DISEASES | Age: 55
End: 2020-01-07
Payer: COMMERCIAL

## 2020-01-07 VITALS
WEIGHT: 165 LBS | BODY MASS INDEX: 25.9 KG/M2 | HEART RATE: 97 BPM | DIASTOLIC BLOOD PRESSURE: 60 MMHG | SYSTOLIC BLOOD PRESSURE: 94 MMHG | TEMPERATURE: 98.5 F | OXYGEN SATURATION: 98 % | RESPIRATION RATE: 18 BRPM | HEIGHT: 67 IN

## 2020-01-07 PROCEDURE — 99214 OFFICE O/P EST MOD 30 MIN: CPT | Performed by: INTERNAL MEDICINE

## 2020-01-07 RX ORDER — CEPHALEXIN 500 MG/1
500 CAPSULE ORAL 4 TIMES DAILY
Qty: 360 CAPSULE | Refills: 0 | Status: SHIPPED | OUTPATIENT
Start: 2020-01-07 | End: 2020-04-06

## 2020-01-07 NOTE — LETTER
[facility] [address]  Phone: [phone]  Fax: [phone]    PCP: Milady Bone MD   CC providers:  Milady Bone MD  [facility] [address] [address] [address] [facility], [address] [facility] [address]  [address] [facility]       January 7, 2020       Patient: Raman Bolanos   MR Number: [id number]   YOB: 1965   Date of Visit: 1/7/2020     Dear Loyda Party: Thank you for allowing me to see your patient Ms. Raman Bolanos. Below are the relevant portions of my assessment and plan of care. [facility] - Office Progress Note  Today's Date and Time: 1/6/2020, 5:32 PM    Diagnostic Impression :     1. MSSA bacteremia    2. Paraspinal abscess ([facility] [address]    3. Diabetic polyneuropathy associated with type 2 diabetes mellitus ([address]    4. Ischemic ulcer of toe of right foot, limited to breakdown of skin ([address]    5. Neuropathy        Recommendations ·   Cefazolin complete 12-23-19  · Keflex 500 mg po qid for chronic suppression. No stop date  · Wound care     Chief complaint/reason for consultation:     Chief Complaint   Patient presents with    Follow-up     Diabetic foot ulcer       History of Present Illness:   Raman Bolanos is a 48y.o.-year-old  female who was evaluated on 1/7/2020. Patient seen at the request of Dr Dave Casarez.     INITIAL HISTORY:     Patient with past Hx of DM 2, CAD with prior MI angioplasty and placement of 2 stents on 2011. She continues to smoke despite above issues.     She has severe PVD with prior Rt fem Pop by Dr Elvia Vicente.  Has undergone care under him since 2016 with numerous studies.     Recent surgery at [facility] by Dr Elvia Vicente with resection of 3rd, 4th and 5th toes and subsequent application of Apligraft 9-19-19. There are orders not to disturb the Rt foot dressings because of graft. Patient also has two small leg non-healing ulcers since fem-pop.     Patient admitted with neck pain. She is unable to walk upstairs to her bedroom . Has been sleeping on her couch with development of neck pain as a result of her neck position on the couch. Admitted with neck pain. Concerns with meningitis. LP done which shows mild traumatic tap. There is no clinical evidence of meningitis or encephalitis.     Patient shows Lt neck muscle inflammatory changes suggestive of myositis.      Will evaluate wounds with Dr Nasim Tejada. Continue wound care as per Dr Trinity Lewis. Patient was seen by Dr. Nasim Tejada and was transferred to [facility] to undergo vascular surgery. A wound VAC was applied to avoid a BKA. She underwent a right foot debridement surgery on December 6, 2019. Patient was discharged home with a wound VAC in place, and cefazolin through 12/23/2019. CURRENT EXAMINATION: 1/6/2020    Patient indicates that she is doing well  Had debridement of infected tissues while at [address] [place] [address] by Dr Nasim Tejada  Since then having VAC changes 3 x week. Has VAC on lateral and medial aspects of the foot and also on leg, near the knee. Bone is exposed on medial aspect. Patient has completed IV antibiotics for MSSA septicemia. She was given Bactrim po after that, which she has also completed. Currently she feels better overall. She will have VAC change at [facility] by Dr Nasim Tejada on 1-8-20. WE have requested Keflex for suppression of MSSA in wounds. DISCUSSION:  · Patient with DM 2, CAD with prior MI and placement of 2 stents on 2011  · Severe PVD with prior Rt fem Pop by Dr Nasim Tejada  · Recent surgery at [facility] by Dr Nasim Tejada with resection of 4th and 5th toes and subsequent application of Apligraft 9-19-19. · Patient unable to walk upstairs to her bedroom . Has been sleeping on her couch with development of neck pain  · Cervical MRI with inflammatory changes in Lt neck muscles  · Blood Cx from 11/24: Positive for Staph Aureus MSSA. · MRI (11/25): Shows significant myositis of the Left posterior neck, facet changes at C3-C4 may demonstrate septic arthritis. · 11-26-19 Rt foot wounds examined with Dr Elana Valente. Purulence and necrosis present. · 12-1-19 Foot wounds much  with topical care and IV Cefazolin but medial aspect is more ischemic. · 12-2-19 Discussed possible amputation of Rt leg. · 12-3-19 Wounds inspected with Dr Elana Valente. · She transferred to [facility] to undergo additional Vascular surgery, VAC placement to try to avoid BKA. · Right foot debridement surgery 12/6/19. · Patient has improved and managed to save the foot. · Has completed IV antibiotics. Continues wound care and VAC. · Placed on po Keflex for suppression until wounds start to heal.    PLAN:  · Cefazolin complete 12-23-19  · Keflex 500 mg po qid for chronic suppression. No stop date  · Wound care     Discussed with patient, family. Patient seen face to face for a period of 30 min, of which more than 50% of the time was spent on counseling, explanation of diagnosis, planning of further management, and answering all questions  . I have personally reviewed the past medical history, past surgical history, medications, social history, and family history, and I have updated the database accordingly.   Past Medical History:     Past Medical History:   Diagnosis Date    CAD (coronary artery disease)     Diabetes mellitus ([facility] [address]     Hyperlipidemia     Hypertension     Type II or unspecified type diabetes mellitus without mention of complication, not stated as uncontrolled        Past Surgical  History:     Past Surgical History:   Procedure Laterality Date    CARDIAC SURGERY      Two Stents placed  Occupation: not employed   Social Needs    Financial resource strain: Not on file    Food insecurity:     Worry: Not on file     Inability: Not on file   [employer] needs:     Medical: Not on file     Non-medical: Not on file   Tobacco Use    Smoking status: Former Smoker     Packs/day: 0.00     Years: 15.00     Pack years: 0.00     Types: Cigarettes     Last attempt to quit: 2015     Years since quittin.0    Smokeless tobacco: Former User    Tobacco comment: non smoker   Substance and Sexual Activity    Alcohol use: No    Drug use: No    Sexual activity: Yes     Partners: Male   Lifestyle    Physical activity:     Days per week: Not on file     Minutes per session: Not on file    Stress: Not on file   Relationships    Social connections:     Talks on phone: Not on file     Gets together: Not on file     Attends Gnosticist service: Not on file     Active member of club or organization: Not on file     Attends meetings of clubs or organizations: Not on file     Relationship status: Not on file    Intimate partner violence:     Fear of current or ex partner: Not on file     Emotionally abused: Not on file     Physically abused: Not on file     Forced sexual activity: Not on file   Other Topics Concern    Not on file   Social History Narrative    Not on file       Family History:     Family History   Problem Relation Age of Onset    Cancer Mother     Colon Cancer Mother     Cancer Father     High Blood Pressure Father     Cancer Brother         Allergies:   Patient has no known allergies. Review of Systems:   Constitutional: No fevers or chills. No systemic complaints  Head: No headaches  Eyes: No double vision or blurry vision. ENT: No sore throat or runny nose. . No hearing loss, tinnitus or vertigo. Cardiovascular: No chest pain or palpitations. No shortness of breath. No MIRANDA  Lung: No shortness of breath or cough.  No sputum production Abdomen: No nausea, vomiting, diarrhea, or abdominal pain. .  Genitourinary: No increased urinary frequency, or dysuria. No hematuria. No suprapubic or CVA pain  Musculoskeletal: No muscle aches or pains. No joint effusions, swelling or deformities  Hematologic: No bleeding or bruising. Neurologic: No headache, weakness, numbness, or tingling. Physical Examination :   BP 94/60 (Site: Left Upper Arm, Position: Sitting, Cuff Size: Medium Adult)   Pulse 97   Temp 98.5 °F (36.9 °C) (Oral)   Resp 18   Ht 5' 7\" (1.702 m)   Wt 165 lb (74.8 kg)   SpO2 98% Comment: room air at rest  BMI 25.84 kg/m²     General Appearance: Awake, alert, and in no apparent distress  Head:  Normocephalic, no trauma  Eyes: Pupils equal, round, reactive, to light and accommodation; extraocular movements intact; sclera anicteric; conjunctivae pink. No embolic phenomena. ENT: Oropharynx clear, without erythema, exudate, or thrush. No tenderness of sinuses. Mouth/throat: mucosa pink and moist. No lesions. Dentition in good repair. Neck:Supple, without lymphadenopathy. Thyroid normal, No bruits. Pulmonary/Chest: Clear to auscultation, without wheezes, rales, or rhonchi. No dullness to percussion. Cardiovascular: Regular rate and rhythm without murmurs, rubs, or gallops. Abdomen: Soft, non tender. Bowel sounds normal. No organomegaly  All four Extremities: No clubbing, edema, or effusions. Rt foot with open medial and lateral wounds. Rt leg wound. Neurologic: No gross motor deficits. Peripheral neuropathy. Skin: Warm and dry with good turgor. Signs of peripheral arterial insufficiency.     Medical Decision Making:   I have independently reviewed/ordered the following labs:    CBC with Differential:   Lab Results   Component Value Date    WBC 7.8 12/30/2019    WBC 9.4 12/16/2019    HGB 10.0 12/30/2019    HGB 9.3 12/16/2019    HCT 29.7 12/30/2019    HCT 26.6 12/16/2019     12/30/2019     12/16/2019     05/26/2012  09/03/2011    LYMPHOPCT 24 12/30/2019    LYMPHOPCT 22 12/16/2019    MONOPCT 7 12/30/2019    MONOPCT 8 12/16/2019     BMP:   Lab Results   Component Value Date     12/30/2019     12/16/2019    K 5.6 12/30/2019    K 4.1 12/16/2019    CL 99 12/30/2019    CL 99 12/16/2019    CO2 25 12/30/2019    CO2 27 12/16/2019    BUN 17 12/30/2019    BUN 17 12/16/2019    CREATININE 1.00 12/30/2019    CREATININE 0.66 12/16/2019    MG 1.8 12/03/2019    MG 2.1 09/02/2011     Hepatic Function Panel:   Lab Results   Component Value Date    PROT 5.6 11/26/2019    PROT 6.7 11/24/2019    LABALBU 1.8 11/26/2019    LABALBU 2.3 11/24/2019    BILIDIR 0.09 11/26/2019    IBILI 0.08 11/26/2019    BILITOT 0.17 11/26/2019    BILITOT 0.38 11/24/2019    ALKPHOS 125 11/26/2019    ALKPHOS 198 11/24/2019    ALT 6 11/26/2019    ALT 7 11/24/2019    AST <5 11/26/2019    AST <5 11/24/2019     No results found for: RPR  No results found for: HIV  No results found for: [facility]  Lab Results   Component Value Date    MUCUS NOT REPORTED 11/24/2019    RBC 3.28 12/30/2019    RBC 4.65 05/26/2012    TRICHOMONAS NOT REPORTED 11/24/2019    WBC 7.8 12/30/2019    YEAST MANY 11/24/2019    TURBIDITY CLOUDY 11/24/2019     Lab Results   Component Value Date    CREATININE 1.00 12/30/2019    GLUCOSE 89 12/30/2019    GLUCOSE 167 05/26/2012       Thank you for allowing us to participate in the care of this patient. Please call with questions. Adryan Domingo MD  Pager: [phone] - Office: [phone]        If you have questions, please do not hesitate to call me. I look forward to following Bryan Yeh along with you.     Sincerely,        Adryan Domingo MD

## 2020-02-13 ENCOUNTER — TELEPHONE (OUTPATIENT)
Dept: INTERNAL MEDICINE | Age: 55
End: 2020-02-13

## 2020-02-14 ENCOUNTER — TELEPHONE (OUTPATIENT)
Dept: INFECTIOUS DISEASES | Age: 55
End: 2020-02-14

## 2020-03-11 ENCOUNTER — TELEPHONE (OUTPATIENT)
Dept: INTERNAL MEDICINE | Age: 55
End: 2020-03-11

## 2020-03-11 NOTE — LETTER
[address]  [address] [facility] [address]  Phone: [phone]  Fax: [phone]    Irene Castro MD        May 7, 2020    [address] [facility] [address]      Dear Kamron Coleman: We were unable to reach you by phone. It is important that you contact us by calling [phone], at your earliest convenience. This message is regarding your Cologuard Test.      If you have any questions or concerns, please don't hesitate to call.     Sincerely,        Irene Castro MD

## 2020-03-25 NOTE — TELEPHONE ENCOUNTER
Shobha request for pt requesting medication refill, metformin  Next Visit Date:  No future appointments. Health Maintenance   Topic Date Due    Hepatitis B vaccine (1 of 3 - Risk 3-dose series) 01/01/1985    Shingles Vaccine (1 of 2) 01/01/2016    Colon cancer screen colonoscopy  01/01/2016    Diabetic retinal exam  12/02/2016    Breast cancer screen  12/02/2017    Cervical cancer screen  12/02/2018    Diabetic microalbuminuria test  03/15/2019    A1C test (Diabetic or Prediabetic)  02/25/2020    Lipid screen  07/11/2020    Diabetic foot exam  07/30/2020    Potassium monitoring  12/30/2020    Creatinine monitoring  12/30/2020    DTaP/Tdap/Td vaccine (2 - Td) 08/13/2025    Flu vaccine  Completed    Pneumococcal 0-64 years Vaccine  Completed    HIV screen  Completed    Hepatitis A vaccine  Aged Out    Hib vaccine  Aged Out    Meningococcal (ACWY) vaccine  Aged Out             (applicable per patient's age: Cancer Screenings, Depression Screening, Fall Risk Screening, Immunizations)    Hemoglobin A1C (%)   Date Value   11/25/2019 9.3 (H)   07/30/2019 8.0   03/11/2019 12.9     Microalb/Crt.  Ratio (mcg/mg creat)   Date Value   03/15/2018 871 (H)     LDL Cholesterol (mg/dL)   Date Value   07/11/2019 90     AST (U/L)   Date Value   11/26/2019 <5     ALT (U/L)   Date Value   11/26/2019 6     BUN (mg/dL)   Date Value   12/30/2019 17      (goal A1C is < 7)   (goal LDL is <100) need 30-50% reduction from baseline     BP Readings from Last 3 Encounters:   01/07/20 94/60   01/01/20 (!) 169/89   12/17/19 125/69    (goal /80)      All Future Testing planned in [facility]:  Lab Frequency Next Occurrence   DENTON Digital Screen Bilateral [LNT6710] Once 05/13/2020   Microalbumin, Ur Once 05/24/2020   XR FOOT RIGHT (MIN 3 VIEWS) Once 08/23/2019   VL Arterial PVR Lower Once 08/23/2019   VL TCPO2 Multiple Sites Once 08/23/2019   Prealbumin Once 08/23/2019   Protein, Total Once 08/23/2019   Cologuard Once

## 2020-04-03 NOTE — TELEPHONE ENCOUNTER
Refill request for lantus. If appropriate please send medication(s) to patients pharmacy. Next appt: None currently. Note to pharmacy to have patient contact the office to schedule appointment. Last appt: 12/17/2019    Health Maintenance   Topic Date Due    Hepatitis B vaccine (1 of 3 - Risk 3-dose series) 01/01/1985    Shingles Vaccine (1 of 2) 01/01/2016    Colon cancer screen colonoscopy  01/01/2016    Diabetic retinal exam  12/02/2016    Breast cancer screen  12/02/2017    Cervical cancer screen  12/02/2018    Diabetic microalbuminuria test  03/15/2019    A1C test (Diabetic or Prediabetic)  02/25/2020    Lipid screen  07/11/2020    Diabetic foot exam  07/30/2020    Potassium monitoring  12/30/2020    Creatinine monitoring  12/30/2020    DTaP/Tdap/Td vaccine (2 - Td) 08/13/2025    Flu vaccine  Completed    Pneumococcal 0-64 years Vaccine  Completed    HIV screen  Completed    Hepatitis A vaccine  Aged Out    Hib vaccine  Aged Out    Meningococcal (ACWY) vaccine  Aged Out       Hemoglobin A1C (%)   Date Value   11/25/2019 9.3 (H)   07/30/2019 8.0   03/11/2019 12.9             ( goal A1C is < 7)   Microalb/Crt.  Ratio (mcg/mg creat)   Date Value   03/15/2018 871 (H)     LDL Cholesterol (mg/dL)   Date Value   07/11/2019 90       (goal LDL is <100)   AST (U/L)   Date Value   11/26/2019 <5     ALT (U/L)   Date Value   11/26/2019 6     BUN (mg/dL)   Date Value   12/30/2019 17     BP Readings from Last 3 Encounters:   01/07/20 94/60   01/01/20 (!) 169/89   12/17/19 125/69          (goal 120/80)          Patient Active Problem List:     Type 2 diabetes mellitus, with long-term current use of insulin (HCC)     Essential hypertension     GERD (gastroesophageal reflux disease)     Claudication of lower extremity (HCC)     Diabetes mellitus ([address]     Panic attack     Leg pain, diffuse     Coronary artery disease involving native heart without angina pectoris     Diabetic polyneuropathy associated with

## 2020-04-07 RX ORDER — INSULIN GLARGINE 100 [IU]/ML
INJECTION, SOLUTION SUBCUTANEOUS
Qty: 15 ML | Refills: 2 | Status: SHIPPED | OUTPATIENT
Start: 2020-04-07 | End: 2020-11-20 | Stop reason: SDUPTHER

## 2020-05-08 ENCOUNTER — VIRTUAL VISIT (OUTPATIENT)
Dept: INTERNAL MEDICINE | Age: 55
End: 2020-05-08
Payer: COMMERCIAL

## 2020-05-08 VITALS — HEIGHT: 67 IN | WEIGHT: 163 LBS | BODY MASS INDEX: 25.58 KG/M2

## 2020-05-08 PROCEDURE — 99441 PR PHYS/QHP TELEPHONE EVALUATION 5-10 MIN: CPT | Performed by: INTERNAL MEDICINE

## 2020-05-08 RX ORDER — TRAMADOL HYDROCHLORIDE 50 MG/1
50 TABLET ORAL EVERY 8 HOURS PRN
Qty: 21 TABLET | Refills: 0 | Status: SHIPPED | OUTPATIENT
Start: 2020-05-08 | End: 2020-05-15

## 2020-05-08 RX ORDER — CLOPIDOGREL BISULFATE 75 MG/1
75 TABLET ORAL DAILY
Qty: 30 TABLET | Refills: 5 | Status: SHIPPED | OUTPATIENT
Start: 2020-05-08 | End: 2020-11-17

## 2020-05-08 RX ORDER — TRAMADOL HYDROCHLORIDE 50 MG/1
50 TABLET ORAL EVERY 6 HOURS PRN
COMMUNITY
End: 2020-05-08

## 2020-05-08 ASSESSMENT — PATIENT HEALTH QUESTIONNAIRE - PHQ9
SUM OF ALL RESPONSES TO PHQ QUESTIONS 1-9: 0
SUM OF ALL RESPONSES TO PHQ9 QUESTIONS 1 & 2: 0
2. FEELING DOWN, DEPRESSED OR HOPELESS: 0
1. LITTLE INTEREST OR PLEASURE IN DOING THINGS: 0
SUM OF ALL RESPONSES TO PHQ QUESTIONS 1-9: 0

## 2020-05-08 NOTE — PROGRESS NOTES
Paul Wood is a 48 y.o. female evaluated via telephone on 5/8/2020. Consent:  She and/or health care decision maker is aware that that she may receive a bill for this telephone service, depending on her insurance coverage, and has provided verbal consent to proceed: Yes      Documentation:  Treatment Adherence:   Medication compliance:  compliant most of the time  Diet compliance:  compliant most of the time  Weight trend: stable  Current exercise: no regular exercise  Barriers: none    Diabetes Mellitus Type 2: Current symptoms/problems include none. Home blood sugar records: fasting range: 116  Any episodes of hypoglycemia? no  Eye exam current (within one year): unknown  Tobacco history: She  reports that she quit smoking about 4 years ago. Her smoking use included cigarettes. She smoked 0.00 packs per day for 15.00 years. She has quit using smokeless tobacco.   Daily Aspirin? Yes    Hypertension:  Home blood pressure monitoring: Yes - .. She is adherent to a low sodium diet. Patient denies chest pain and shortness of breath. Antihypertensive medication side effects: no medication side effects noted. Use of agents associated with hypertension: none. Hyperlipidemia:  No new myalgias or GI upset on atorvastatin (Lipitor). Lab Results   Component Value Date    LABA1C 9.3 (H) 11/25/2019    LABA1C 8.0 07/30/2019    LABA1C 12.9 03/11/2019     Lab Results   Component Value Date    LABMICR 871 (H) 03/15/2018    CREATININE 1.00 (H) 12/30/2019     Lab Results   Component Value Date    ALT 6 11/26/2019    AST <5 11/26/2019     Lab Results   Component Value Date    CHOL 170 07/11/2019    TRIG 218 (H) 07/11/2019    HDL 36 (L) 07/11/2019    LDLDIRECT 166 (H) 06/18/2018           Diagnosis Orders   1. Employs prosthetic leg  traMADol (ULTRAM) 50 MG tablet   2. Essential hypertension     3.  Type 2 diabetes mellitus with chronic kidney disease, without long-term current use of insulin, unspecified CKD stage ([facility]     4. Claudication of lower extremity ([facility] [address]         I affirm this is a Patient Initiated Episode with a Patient who has not had a related appointment within my department in the past 7 days or scheduled within the next 24 hours.     Patient identification was verified at the start of the visit: Yes    Total Time: minutes: 5-10 minutes    Note: not billable if this call serves to triage the patient into an appointment for the relevant concern      Darnell Valverde

## 2020-06-23 NOTE — TELEPHONE ENCOUNTER
Request for pen needles - medication pended. Please fill if appropriate. Next Visit Date:  No future appointments. Health Maintenance   Topic Date Due    Hepatitis B vaccine (1 of 3 - Risk 3-dose series) 01/01/1985    Shingles Vaccine (1 of 2) 01/01/2016    Colon cancer screen colonoscopy  01/01/2016    Diabetic retinal exam  12/02/2016    Breast cancer screen  12/02/2017    Cervical cancer screen  12/02/2018    Diabetic microalbuminuria test  03/15/2019    A1C test (Diabetic or Prediabetic)  02/25/2020    Lipid screen  07/11/2020    Diabetic foot exam  07/30/2020    Potassium monitoring  12/30/2020    Creatinine monitoring  12/30/2020    DTaP/Tdap/Td vaccine (2 - Td) 08/13/2025    Flu vaccine  Completed    Pneumococcal 0-64 years Vaccine  Completed    HIV screen  Completed    Hepatitis A vaccine  Aged Out    Hib vaccine  Aged Out    Meningococcal (ACWY) vaccine  Aged Out       Hemoglobin A1C (%)   Date Value   11/25/2019 9.3 (H)   07/30/2019 8.0   03/11/2019 12.9             ( goal A1C is < 7)   Microalb/Crt.  Ratio (mcg/mg creat)   Date Value   03/15/2018 871 (H)     LDL Cholesterol (mg/dL)   Date Value   07/11/2019 90       (goal LDL is <100)   AST (U/L)   Date Value   11/26/2019 <5     ALT (U/L)   Date Value   11/26/2019 6     BUN (mg/dL)   Date Value   12/30/2019 17     BP Readings from Last 3 Encounters:   01/07/20 94/60   01/01/20 (!) 169/89   12/17/19 125/69          (goal 120/80)    All Future Testing planned in [facility]  Lab Frequency Next Occurrence   Microalbumin, Ur Once 07/01/2020   XR FOOT RIGHT (MIN 3 VIEWS) Once 08/23/2019   VL Arterial PVR Lower Once 08/23/2019   VL TCPO2 Multiple Sites Once 08/23/2019   Prealbumin Once 08/23/2019   Protein, Total Once 08/23/2019   Cologuard Once 12/17/2020   NM MYOCARDIAL SPECT REST EXERCISE OR RX Once 06/18/2020   CBC With Auto Differential Once 08/82/9122   Basic Metabolic Panel Once 52/06/1948   Hemoglobin and Hematocrit, Blood, Post Transfusion POST TRANSFUSION          Patient Active Problem List:     Type 2 diabetes mellitus, with long-term current use of insulin (HCC)     Essential hypertension     GERD (gastroesophageal reflux disease)     Claudication of lower extremity (HCC)     Diabetes mellitus ([facility] [address]     Panic attack     Leg pain, diffuse     Coronary artery disease involving native heart without angina pectoris     Diabetic polyneuropathy associated with type 2 diabetes mellitus (HCC)     Neuropathy     Diabetic ulcer of right midfoot associated with type 2 diabetes mellitus (HCC)     Ischemic ulcer of toe of right foot, limited to breakdown of skin (HCC)     Thrombocytosis (HCC)     Anemia     Paraspinal abscess (HCC)     Neck pain     Penetrating foot wound, right, subsequent encounter     MSSA bacteremia     Dyslipidemia

## 2020-06-29 RX ORDER — PEN NEEDLE, DIABETIC 31 G X1/4"
NEEDLE, DISPOSABLE MISCELLANEOUS
Qty: 100 EACH | Refills: 3 | Status: SHIPPED | OUTPATIENT
Start: 2020-06-29 | End: 2021-07-22 | Stop reason: SDUPTHER

## 2020-10-06 ENCOUNTER — TELEPHONE (OUTPATIENT)
Dept: INTERNAL MEDICINE | Age: 55
End: 2020-10-06

## 2020-10-28 ENCOUNTER — TELEPHONE (OUTPATIENT)
Dept: INTERNAL MEDICINE | Age: 55
End: 2020-10-28

## 2020-11-17 RX ORDER — CLOPIDOGREL BISULFATE 75 MG/1
TABLET ORAL
Qty: 30 TABLET | Refills: 5 | Status: SHIPPED | OUTPATIENT
Start: 2020-11-17 | End: 2021-05-17

## 2020-11-17 NOTE — TELEPHONE ENCOUNTER
Request for clopidogrel - med pended. Please fill if appropriate. Next Visit Date:  Future Appointments   Date Time Provider Leonel Dhilloni   11/20/2020 10:40 AM Jorge Alberto Dorado MD [facility] IM [address] Maintenance   Topic Date Due    Hepatitis B vaccine (1 of 3 - Risk 3-dose series) 01/01/1985    Shingles Vaccine (1 of 2) 01/01/2016    Colon cancer screen colonoscopy  01/01/2016    Diabetic retinal exam  12/02/2016    Breast cancer screen  12/02/2017    Cervical cancer screen  12/02/2018    Diabetic microalbuminuria test  03/15/2019    A1C test (Diabetic or Prediabetic)  02/25/2020    Lipid screen  07/11/2020    Diabetic foot exam  07/30/2020    Flu vaccine (1) 09/01/2020    Potassium monitoring  12/30/2020    Creatinine monitoring  12/30/2020    DTaP/Tdap/Td vaccine (2 - Td) 08/13/2025    Pneumococcal 0-64 years Vaccine  Completed    HIV screen  Completed    Hepatitis A vaccine  Aged Out    Hib vaccine  Aged Out    Meningococcal (ACWY) vaccine  Aged Out       Hemoglobin A1C (%)   Date Value   11/25/2019 9.3 (H)   07/30/2019 8.0   03/11/2019 12.9             ( goal A1C is < 7)   Microalb/Crt.  Ratio (mcg/mg creat)   Date Value   03/15/2018 871 (H)     LDL Cholesterol (mg/dL)   Date Value   07/11/2019 90       (goal LDL is <100)   AST (U/L)   Date Value   11/26/2019 <5     ALT (U/L)   Date Value   11/26/2019 6     BUN (mg/dL)   Date Value   12/30/2019 17     BP Readings from Last 3 Encounters:   01/07/20 94/60   01/01/20 (!) 169/89   12/17/19 125/69          (goal 120/80)    All Future Testing planned in [facility]  Lab Frequency Next Occurrence   Cologuard Once 12/17/2020   NM MYOCARDIAL SPECT REST EXERCISE OR RX Once 12/17/2020   Hemoglobin A1C Once 01/14/2021   Hemoglobin and Hematocrit, Blood, Post Transfusion POST TRANSFUSION          Patient Active Problem List:     Type 2 diabetes mellitus, with long-term current use of insulin (HCC)     Essential hypertension     GERD (gastroesophageal reflux disease)     Claudication of lower extremity (HCC)     Diabetes mellitus ([facility] [address]     Panic attack     Leg pain, diffuse     Coronary artery disease involving native heart without angina pectoris     Diabetic polyneuropathy associated with type 2 diabetes mellitus (HCC)     Neuropathy     Diabetic ulcer of right midfoot associated with type 2 diabetes mellitus (HCC)     Ischemic ulcer of toe of right foot, limited to breakdown of skin (HCC)     Thrombocytosis (HCC)     Anemia     Paraspinal abscess (HCC)     Neck pain     Penetrating foot wound, right, subsequent encounter     MSSA bacteremia     Dyslipidemia

## 2020-11-20 ENCOUNTER — VIRTUAL VISIT (OUTPATIENT)
Dept: INTERNAL MEDICINE | Age: 55
End: 2020-11-20
Payer: COMMERCIAL

## 2020-11-20 PROCEDURE — 99442 PR PHYS/QHP TELEPHONE EVALUATION 11-20 MIN: CPT | Performed by: INTERNAL MEDICINE

## 2020-11-20 RX ORDER — LISINOPRIL AND HYDROCHLOROTHIAZIDE 25; 20 MG/1; MG/1
1 TABLET ORAL DAILY
Qty: 30 TABLET | Refills: 1 | Status: SHIPPED | OUTPATIENT
Start: 2020-11-20 | End: 2021-09-07

## 2020-11-20 RX ORDER — INSULIN GLARGINE 100 [IU]/ML
INJECTION, SOLUTION SUBCUTANEOUS
Qty: 15 ML | Refills: 1 | Status: SHIPPED | OUTPATIENT
Start: 2020-11-20 | End: 2021-07-22 | Stop reason: SDUPTHER

## 2020-11-20 RX ORDER — TRAMADOL HYDROCHLORIDE 50 MG/1
50 TABLET ORAL EVERY 6 HOURS PRN
Qty: 28 TABLET | Refills: 0 | Status: SHIPPED | OUTPATIENT
Start: 2020-11-20 | End: 2020-11-27

## 2020-11-20 NOTE — PATIENT INSTRUCTIONS
Medications e-scribe to pharmacy of pt's choice. Scripts for lab mailed to pt with fasting instructions, pt will get labs done before your next appointment. Script for Mammogram and instructions for DENTON mailed to pt. Scheduling will call with the appointment date and time. Please call [phone] if you haven't heard anything from them. Referral for colonoscopy sent to [facility], they will call pt for appt. Copy of referral with address and number given to pt. Referral for Vascular Surgery sent to Dr Armando Murphy  they will call pt for appt, copy of referral with number and address mailed to pt. Pt should call referral number if not heard from within a couple of weeks. Patient to return to clinic 2 months (1/21/21) for an in office visit. AVS reviewed and mailed to pt. It is very important for your care that you keep your appointment. If for some reason you are unable to keep your appointment it is equally important that you call our office at [phone] to cancel your appointment and reschedule. Failure to do so may result in your termination from our practice.   MB

## 2020-12-02 ENCOUNTER — TELEPHONE (OUTPATIENT)
Dept: INTERNAL MEDICINE | Age: 55
End: 2020-12-02

## 2020-12-02 ENCOUNTER — TELEPHONE (OUTPATIENT)
Dept: VASCULAR SURGERY | Age: 55
End: 2020-12-02

## 2020-12-02 NOTE — TELEPHONE ENCOUNTER
Phone call from [facility] stating that they need to have a PVR testing done prior to them seeing her. She stated that she will schedule the PVR testing and an appt. Health Maintenance   Topic Date Due    Hepatitis B vaccine (1 of 3 - Risk 3-dose series) 01/01/1985    Shingles Vaccine (1 of 2) 01/01/2016    Colon cancer screen colonoscopy  01/01/2016    Diabetic retinal exam  12/02/2016    Breast cancer screen  12/02/2017    Cervical cancer screen  12/02/2018    Diabetic microalbuminuria test  03/15/2019    Lipid screen  07/11/2020    Diabetic foot exam  07/30/2020    Flu vaccine (1) 09/01/2020    A1C test (Diabetic or Prediabetic)  11/25/2020    Potassium monitoring  12/30/2020    Creatinine monitoring  12/30/2020    DTaP/Tdap/Td vaccine (2 - Td) 08/13/2025    HIV screen  Completed    Hepatitis A vaccine  Aged Out    Hib vaccine  Aged Out    Meningococcal (ACWY) vaccine  Aged Out    Pneumococcal 0-64 years Vaccine  Aged Out             (applicable per patient's age: Cancer Screenings, Depression Screening, Fall Risk Screening, Immunizations)    Hemoglobin A1C (%)   Date Value   11/25/2019 9.3 (H)   07/30/2019 8.0   03/11/2019 12.9     Microalb/Crt.  Ratio (mcg/mg creat)   Date Value   03/15/2018 871 (H)     LDL Cholesterol (mg/dL)   Date Value   07/11/2019 90     AST (U/L)   Date Value   11/26/2019 <5     ALT (U/L)   Date Value   11/26/2019 6     BUN (mg/dL)   Date Value   12/30/2019 17      (goal A1C is < 7)   (goal LDL is <100) need 30-50% reduction from baseline     BP Readings from Last 3 Encounters:   01/07/20 94/60   01/01/20 (!) 169/89   12/17/19 125/69    (goal /80)      All Future Testing planned in [facility]:  Lab Frequency Next Occurrence   Cologuard Once 12/17/2020   NM MYOCARDIAL SPECT REST EXERCISE OR RX Once 12/17/2020   Hemoglobin A1C Once 01/14/2021   DENTON DIGITAL SCREEN W OR WO CAD BILATERAL Once 02/20/2021   Microalbumin, Ur Once 02/20/2021   Lipid Panel Once 26/26/5647   Basic Metabolic Panel Once 73/86/9853   CBC Auto Differential Once 02/28/2021   Hemoglobin and Hematocrit, Blood, Post Transfusion POST TRANSFUSION        Next Visit Date:  Future Appointments   Date Time Provider Leonel Stephens   1/21/2021 12:40 PM Janet Mendoza MD [id number]            Patient Active Problem List:     Type 2 diabetes mellitus, with long-term current use of insulin ([facility] [address]     Essential hypertension     GERD (gastroesophageal reflux disease)     Claudication of lower extremity (HCC)     Panic attack     Leg pain, diffuse     Coronary artery disease involving native heart without angina pectoris     Diabetic polyneuropathy associated with type 2 diabetes mellitus (HCC)     Neuropathy     Diabetic ulcer of right midfoot associated with type 2 diabetes mellitus (HCC)     Ischemic ulcer of toe of right foot, limited to breakdown of skin (HCC)     Thrombocytosis (HCC)     Anemia     Paraspinal abscess (HCC)     Neck pain     Penetrating foot wound, right, subsequent encounter     MSSA bacteremia     Dyslipidemia

## 2020-12-02 NOTE — TELEPHONE ENCOUNTER
Spoke to Florentin Bailon at referring providers office she states she will have her provider put the orders in for PVR testing. Once orders are put in will schedule pt for both testing and consult.

## 2020-12-09 ENCOUNTER — TELEPHONE (OUTPATIENT)
Dept: VASCULAR SURGERY | Age: 55
End: 2020-12-09

## 2021-02-19 ENCOUNTER — TELEPHONE (OUTPATIENT)
Dept: GASTROENTEROLOGY | Age: 56
End: 2021-02-19

## 2021-03-01 ENCOUNTER — TELEPHONE (OUTPATIENT)
Dept: INTERNAL MEDICINE | Age: 56
End: 2021-03-01

## 2021-04-21 ENCOUNTER — TELEPHONE (OUTPATIENT)
Dept: INTERNAL MEDICINE | Age: 56
End: 2021-04-21

## 2021-05-12 ENCOUNTER — HOSPITAL ENCOUNTER (OUTPATIENT)
Age: 56
Setting detail: SPECIMEN
Discharge: HOME | End: 2021-05-12
Payer: COMMERCIAL

## 2021-05-12 DIAGNOSIS — E11.65 TYPE 2 DIABETES MELLITUS WITH HYPERGLYCEMIA, WITH LONG-TERM CURRENT USE OF INSULIN (HCC): ICD-10-CM

## 2021-05-12 DIAGNOSIS — E87.5 HYPERKALEMIA: ICD-10-CM

## 2021-05-12 DIAGNOSIS — Z79.4 TYPE 2 DIABETES MELLITUS WITH HYPERGLYCEMIA, WITH LONG-TERM CURRENT USE OF INSULIN (HCC): ICD-10-CM

## 2021-05-12 DIAGNOSIS — I10 ESSENTIAL HYPERTENSION: ICD-10-CM

## 2021-05-12 LAB
ANION GAP SERPL CALCULATED.3IONS-SCNC: 14 MMOL/L (ref 9–17)
BUN BLDV-MCNC: 12 MG/DL (ref 6–20)
BUN/CREAT BLD: ABNORMAL (ref 9–20)
CALCIUM SERPL-MCNC: 9.3 MG/DL (ref 8.6–10.4)
CHLORIDE BLD-SCNC: 102 MMOL/L (ref 98–107)
CHOLESTEROL/HDL RATIO: 6.4
CHOLESTEROL: 224 MG/DL
CO2: 23 MMOL/L (ref 20–31)
CREAT SERPL-MCNC: 0.72 MG/DL (ref 0.5–0.9)
CREATININE URINE: 71.2 MG/DL (ref 28–217)
GFR AFRICAN AMERICAN: >60 ML/MIN
GFR NON-AFRICAN AMERICAN: >60 ML/MIN
GFR SERPL CREATININE-BSD FRML MDRD: ABNORMAL ML/MIN/{1.73_M2}
GFR SERPL CREATININE-BSD FRML MDRD: ABNORMAL ML/MIN/{1.73_M2}
GLUCOSE BLD-MCNC: 228 MG/DL (ref 70–99)
HDLC SERPL-MCNC: 35 MG/DL
LDL CHOLESTEROL: 138 MG/DL (ref 0–130)
MICROALBUMIN/CREAT 24H UR: 462 MG/L
MICROALBUMIN/CREAT UR-RTO: 649 MCG/MG CREAT
POTASSIUM SERPL-SCNC: 4.1 MMOL/L (ref 3.7–5.3)
SODIUM BLD-SCNC: 139 MMOL/L (ref 135–144)
TRIGL SERPL-MCNC: 254 MG/DL
VLDLC SERPL CALC-MCNC: ABNORMAL MG/DL (ref 1–30)

## 2021-05-14 NOTE — TELEPHONE ENCOUNTER
E-scribe request from [address] [place] [address] for Clopidogrel, no future appointments scheduled. Last seen by Dr. Alex Canales on 11/20/20. Patient cancelled her appt on 1/21/21 and did not reschedule another appt.

## 2021-05-17 RX ORDER — CLOPIDOGREL BISULFATE 75 MG/1
TABLET ORAL
Qty: 30 TABLET | Refills: 5 | Status: SHIPPED | OUTPATIENT
Start: 2021-05-17 | End: 2021-11-16 | Stop reason: SDUPTHER

## 2021-06-22 ENCOUNTER — TELEPHONE (OUTPATIENT)
Dept: INTERNAL MEDICINE | Age: 56
End: 2021-06-22

## 2021-06-28 ENCOUNTER — TELEPHONE (OUTPATIENT)
Dept: INTERNAL MEDICINE | Age: 56
End: 2021-06-28

## 2021-07-02 NOTE — TELEPHONE ENCOUNTER
Request for metformin. Next Visit Date: placed on wait list for Dr. Erick Bustamante d/t schedule not currently open  No future appointments. Health Maintenance   Topic Date Due    Hepatitis C screen  Never done    COVID-19 Vaccine (1) Never done    Hepatitis B vaccine (1 of 3 - Risk 3-dose series) Never done    Shingles Vaccine (1 of 2) Never done    Colon cancer screen colonoscopy  Never done    Diabetic retinal exam  12/02/2016    Breast cancer screen  12/02/2017    Cervical cancer screen  12/02/2018    Diabetic foot exam  07/30/2020    A1C test (Diabetic or Prediabetic)  11/25/2020    Flu vaccine (1) 09/01/2021    Diabetic microalbuminuria test  05/12/2022    Lipid screen  05/12/2022    Potassium monitoring  05/12/2022    Creatinine monitoring  05/12/2022    DTaP/Tdap/Td vaccine (2 - Td or Tdap) 08/13/2025    Pneumococcal 0-64 years Vaccine (2 of 2 - PPSV23) 01/01/2031    HIV screen  Completed    Hepatitis A vaccine  Aged Out    Hib vaccine  Aged Out    Meningococcal (ACWY) vaccine  Aged Out       Hemoglobin A1C (%)   Date Value   11/25/2019 9.3 (H)   07/30/2019 8.0   03/11/2019 12.9             ( goal A1C is < 7)   Microalb/Crt.  Ratio (mcg/mg creat)   Date Value   05/12/2021 649 (H)     LDL Cholesterol (mg/dL)   Date Value   05/12/2021 138 (H)       (goal LDL is <100)   AST (U/L)   Date Value   11/26/2019 <5     ALT (U/L)   Date Value   11/26/2019 6     BUN (mg/dL)   Date Value   05/12/2021 12     BP Readings from Last 3 Encounters:   01/07/20 94/60   01/01/20 (!) 169/89   12/17/19 125/69          (goal 120/80)    All Future Testing planned in [facility]  Lab Frequency Next Occurrence   Hemoglobin A1C Once 08/04/2021   [facility] DIGITAL SCREEN W OR WO CAD BILATERAL Once 09/28/2021   CBC Auto Differential Once 09/22/2021   VL ARTERIAL PVR LOWER WO EXERCISE Once 10/24/2021   Hemoglobin and Hematocrit, Blood, Post Transfusion POST TRANSFUSION          Patient Active Problem List:     Type 2 diabetes mellitus, with long-term current use of insulin (HCC)     Essential hypertension     GERD (gastroesophageal reflux disease)     Claudication of lower extremity (HCC)     Panic attack     Leg pain, diffuse     Coronary artery disease involving native heart without angina pectoris     Diabetic polyneuropathy associated with type 2 diabetes mellitus (HCC)     Neuropathy     Diabetic ulcer of right midfoot associated with type 2 diabetes mellitus (HCC)     Ischemic ulcer of toe of right foot, limited to breakdown of skin (HCC)     Thrombocytosis (HCC)     Anemia     Paraspinal abscess (HCC)     Neck pain     Penetrating foot wound, right, subsequent encounter     MSSA bacteremia     Dyslipidemia

## 2021-07-15 NOTE — TELEPHONE ENCOUNTER
Called and spoke to pt regarding referral. Pt stated, \"I am not doing that\".      Sending referral back due to pt's refusal.

## 2021-07-22 DIAGNOSIS — E11.65 TYPE 2 DIABETES MELLITUS WITH HYPERGLYCEMIA, WITH LONG-TERM CURRENT USE OF INSULIN (HCC): ICD-10-CM

## 2021-07-22 DIAGNOSIS — Z79.4 TYPE 2 DIABETES MELLITUS WITH HYPERGLYCEMIA, WITH LONG-TERM CURRENT USE OF INSULIN (HCC): ICD-10-CM

## 2021-07-22 RX ORDER — INSULIN GLARGINE 100 [IU]/ML
INJECTION, SOLUTION SUBCUTANEOUS
Qty: 15 ML | Refills: 1 | Status: SHIPPED | OUTPATIENT
Start: 2021-07-22 | End: 2021-09-07

## 2021-07-22 RX ORDER — PEN NEEDLE, DIABETIC 31 G X1/4"
NEEDLE, DISPOSABLE MISCELLANEOUS
Qty: 100 EACH | Refills: 3 | Status: SHIPPED | OUTPATIENT
Start: 2021-07-22

## 2021-07-22 NOTE — TELEPHONE ENCOUNTER
Request for lantus and pen needles. Next Visit Date:  Future Appointments   Date Time Provider Leonel Dhilloni   8/10/2021  1:40 PM Jerad Thomas MD [facility] [address] Maintenance   Topic Date Due    Hepatitis C screen  Never done    COVID-19 Vaccine (1) Never done    Hepatitis B vaccine (1 of 3 - Risk 3-dose series) Never done    Colon cancer screen colonoscopy  Never done    Shingles Vaccine (1 of 2) Never done    Diabetic retinal exam  12/02/2016    Breast cancer screen  12/02/2017    Cervical cancer screen  12/02/2018    Diabetic foot exam  07/30/2020    A1C test (Diabetic or Prediabetic)  11/25/2020    Flu vaccine (1) 09/01/2021    Diabetic microalbuminuria test  05/12/2022    Lipid screen  05/12/2022    Potassium monitoring  05/12/2022    Creatinine monitoring  05/12/2022    DTaP/Tdap/Td vaccine (2 - Td or Tdap) 08/13/2025    Pneumococcal 0-64 years Vaccine (2 of 2 - PPSV23) 01/01/2031    HIV screen  Completed    Hepatitis A vaccine  Aged Out    Hib vaccine  Aged Out    Meningococcal (ACWY) vaccine  Aged Out       Hemoglobin A1C (%)   Date Value   11/25/2019 9.3 (H)   07/30/2019 8.0   03/11/2019 12.9             ( goal A1C is < 7)   Microalb/Crt.  Ratio (mcg/mg creat)   Date Value   05/12/2021 649 (H)     LDL Cholesterol (mg/dL)   Date Value   05/12/2021 138 (H)       (goal LDL is <100)   AST (U/L)   Date Value   11/26/2019 <5     ALT (U/L)   Date Value   11/26/2019 6     BUN (mg/dL)   Date Value   05/12/2021 12     BP Readings from Last 3 Encounters:   01/07/20 94/60   01/01/20 (!) 169/89   12/17/19 125/69          (goal 120/80)    All Future Testing planned in [facility]  Lab Frequency Next Occurrence   Hemoglobin A1C Once 08/04/2021   [facility] DIGITAL SCREEN W OR WO CAD BILATERAL Once 09/28/2021   CBC Auto Differential Once 09/22/2021   VL ARTERIAL PVR LOWER WO EXERCISE Once 10/24/2021   Hemoglobin and Hematocrit, Blood, Post Transfusion POST TRANSFUSION          Patient Active Problem List:     Type 2 diabetes mellitus, with long-term current use of insulin (HCC)     Essential hypertension     GERD (gastroesophageal reflux disease)     Claudication of lower extremity (HCC)     Panic attack     Leg pain, diffuse     Coronary artery disease involving native heart without angina pectoris     Diabetic polyneuropathy associated with type 2 diabetes mellitus (HCC)     Neuropathy     Diabetic ulcer of right midfoot associated with type 2 diabetes mellitus (HCC)     Ischemic ulcer of toe of right foot, limited to breakdown of skin (HCC)     Thrombocytosis (HCC)     Anemia     Paraspinal abscess (HCC)     Neck pain     Penetrating foot wound, right, subsequent encounter     MSSA bacteremia     Dyslipidemia

## 2021-09-07 ENCOUNTER — VIRTUAL VISIT (OUTPATIENT)
Dept: INTERNAL MEDICINE | Age: 56
End: 2021-09-07
Payer: COMMERCIAL

## 2021-09-07 DIAGNOSIS — E11.65 TYPE 2 DIABETES MELLITUS WITH HYPERGLYCEMIA, WITH LONG-TERM CURRENT USE OF INSULIN (HCC): Primary | ICD-10-CM

## 2021-09-07 DIAGNOSIS — I73.9 CLAUDICATION OF LEFT LOWER EXTREMITY (HCC): ICD-10-CM

## 2021-09-07 DIAGNOSIS — Z11.59 ENCOUNTER FOR HEPATITIS C SCREENING TEST FOR LOW RISK PATIENT: ICD-10-CM

## 2021-09-07 DIAGNOSIS — Z79.4 TYPE 2 DIABETES MELLITUS WITH HYPERGLYCEMIA, WITH LONG-TERM CURRENT USE OF INSULIN (HCC): Primary | ICD-10-CM

## 2021-09-07 DIAGNOSIS — D75.839 THROMBOCYTOSIS: ICD-10-CM

## 2021-09-07 DIAGNOSIS — Z12.11 COLON CANCER SCREENING: ICD-10-CM

## 2021-09-07 PROCEDURE — 99441 PR PHYS/QHP TELEPHONE EVALUATION 5-10 MIN: CPT | Performed by: INTERNAL MEDICINE

## 2021-09-07 RX ORDER — ATORVASTATIN CALCIUM 40 MG/1
40 TABLET, FILM COATED ORAL DAILY
Qty: 30 TABLET | Refills: 6 | Status: SHIPPED | OUTPATIENT
Start: 2021-09-07 | End: 2022-10-04

## 2021-09-07 SDOH — ECONOMIC STABILITY: TRANSPORTATION INSECURITY
IN THE PAST 12 MONTHS, HAS THE LACK OF TRANSPORTATION KEPT YOU FROM MEDICAL APPOINTMENTS OR FROM GETTING MEDICATIONS?: YES

## 2021-09-07 SDOH — ECONOMIC STABILITY: FOOD INSECURITY: WITHIN THE PAST 12 MONTHS, YOU WORRIED THAT YOUR FOOD WOULD RUN OUT BEFORE YOU GOT MONEY TO BUY MORE.: NEVER TRUE

## 2021-09-07 SDOH — ECONOMIC STABILITY: FOOD INSECURITY: WITHIN THE PAST 12 MONTHS, THE FOOD YOU BOUGHT JUST DIDN'T LAST AND YOU DIDN'T HAVE MONEY TO GET MORE.: NEVER TRUE

## 2021-09-07 SDOH — ECONOMIC STABILITY: TRANSPORTATION INSECURITY
IN THE PAST 12 MONTHS, HAS LACK OF TRANSPORTATION KEPT YOU FROM MEETINGS, WORK, OR FROM GETTING THINGS NEEDED FOR DAILY LIVING?: YES

## 2021-09-07 ASSESSMENT — PATIENT HEALTH QUESTIONNAIRE - PHQ9
SUM OF ALL RESPONSES TO PHQ QUESTIONS 1-9: 0
SUM OF ALL RESPONSES TO PHQ QUESTIONS 1-9: 0
1. LITTLE INTEREST OR PLEASURE IN DOING THINGS: 0
2. FEELING DOWN, DEPRESSED OR HOPELESS: 0
SUM OF ALL RESPONSES TO PHQ QUESTIONS 1-9: 0
SUM OF ALL RESPONSES TO PHQ9 QUESTIONS 1 & 2: 0

## 2021-09-07 ASSESSMENT — SOCIAL DETERMINANTS OF HEALTH (SDOH): HOW HARD IS IT FOR YOU TO PAY FOR THE VERY BASICS LIKE FOOD, HOUSING, MEDICAL CARE, AND HEATING?: NOT HARD AT ALL

## 2021-09-07 NOTE — PATIENT INSTRUCTIONS
Medications e-scribe to pharmacy of pt's choice. Reprinted labs and mailed to patient. Needs to get done ASAP. Reprinted Mammogram mailed to patient. Patient needs to schedule an appt. Referral for colonoscopy sent to [address] [place] [address], they will call pt for appt. Copy of referral with address and number given to pt. Patient to return to clinic 4 weeks (10/5/21). IN OFFICE  AVS reviewed and given to pt. It is very important for your care that you keep your appointment. If for some reason you are unable to keep your appointment it is equally important that you call our office at [phone] to cancel your appointment and reschedule. Failure to do so may result in your termination from our practice.   MB

## 2021-09-07 NOTE — PROGRESS NOTES
Enrique Teixeira is a 55 y.o. female evaluated via telephone on 9/7/2021. Consent:  She and/or health care decision maker is aware that that she may receive a bill for this telephone service, depending on her insurance coverage, and has provided verbal consent to proceed: Yes      Documentation:  55year old female was suppose to come in for F2F visit but reports no transportation. She was asked to follow-up physically to check blood pressure and was immediately. Patient last face-to-face office visit by over 2 years ago and she has not completed lab work that has been requested multiple times. Patient also has not followed up with referral to colonoscopy as well as vascular surgery. Patient has a complicated history of uncontrolled diabetes mellitus type 2 along with coronary disease and severe peripheral vascular disease. Patient had in early 2020 ischemic ulcers of the toes on her right foot requiring resection. Patient also in early 2020 had a paraspinal abscess for which she saw infectious disease and was treated with long-term antibiotics. Patient reports she has not been taking her statin nor is she taking Keflex which was prescribed for chronic suppression with no stop date. Patient is requesting tramadol today. Had a lengthy conversation with patient that I cannot at this time prescribe medications as I have asked her multiple times to follow-up with me face-to-face in the visit as well as get lab work done which neither has been followed through with. Patient is willing to get lab work done and will schedule follow-up. Patient recommended to also follow through with colonoscopy and her vascular surgery referral.     Diagnosis Orders   1. Type 2 diabetes mellitus with hyperglycemia, with long-term current use of insulin ([address]     2. Thrombocytosis ([address]     3. Colon cancer screening  [facility] Colon[facility]   4.  Encounter for hepatitis C screening test for low risk patient  Hepatitis C Antibody   5. Claudication of left lower extremity (HCC)  atorvastatin (LIPITOR) 40 MG tablet   6. Diabetes mellitus type 2, uncontrolled (HCC)  atorvastatin (LIPITOR) 40 MG tablet   A1C and CBC reprinted  Fasting blood 100  lipitor sent to pharmacy    I affirm this is a Patient Initiated Episode with a Patient who has not had a related appointment within my department in the past 7 days or scheduled within the next 24 hours. Patient identification was verified at the start of the visit: Yes    Total Time: minutes: 5-10 minutes    The visit was conducted pursuant to the emergency declaration under the [address] [place] [address], [address] [place] [address] authority and the [employer] Act. Patient identification was verified, and a caregiver was present when appropriate. The patient was located in a state where the provider was credentialed to provide care.     Note: not billable if this call serves to triage the patient into an appointment for the relevant concern      Loree Chao MD

## 2021-09-28 ENCOUNTER — TELEPHONE (OUTPATIENT)
Dept: FAMILY MEDICINE CLINIC | Age: 56
End: 2021-09-28

## 2021-09-28 NOTE — TELEPHONE ENCOUNTER
Alyssia Headley was contacted as part of mammography outreach.  Left voicemail with a reminder for the patient to schedule their mammogram.     Zurdo Louis

## 2021-10-15 ENCOUNTER — TELEPHONE (OUTPATIENT)
Dept: SURGERY | Age: 56
End: 2021-10-15

## 2021-10-19 ENCOUNTER — TELEPHONE (OUTPATIENT)
Dept: SURGERY | Age: 56
End: 2021-10-19

## 2021-10-26 ENCOUNTER — TELEPHONE (OUTPATIENT)
Dept: SURGERY | Age: 56
End: 2021-10-26

## 2021-11-12 ENCOUNTER — HOSPITAL ENCOUNTER (OUTPATIENT)
Age: 56
Setting detail: SPECIMEN
Discharge: HOME | End: 2021-11-12
Payer: COMMERCIAL

## 2021-11-12 LAB
ABSOLUTE EOS #: 0.32 K/UL (ref 0–0.44)
ABSOLUTE IMMATURE GRANULOCYTE: 0.04 K/UL (ref 0–0.3)
ABSOLUTE LYMPH #: 2.21 K/UL (ref 1.1–3.7)
ABSOLUTE MONO #: 0.7 K/UL (ref 0.1–1.2)
BASOPHILS # BLD: 1 % (ref 0–2)
BASOPHILS ABSOLUTE: 0.09 K/UL (ref 0–0.2)
DIFFERENTIAL TYPE: ABNORMAL
EOSINOPHILS RELATIVE PERCENT: 3 % (ref 1–4)
HCT VFR BLD CALC: 43.4 % (ref 36.3–47.1)
HEMOGLOBIN: 13.6 G/DL (ref 11.9–15.1)
HEPATITIS C ANTIBODY: NONREACTIVE
IMMATURE GRANULOCYTES: 0 %
LYMPHOCYTES # BLD: 23 % (ref 24–43)
MCH RBC QN AUTO: 31.2 PG (ref 25.2–33.5)
MCHC RBC AUTO-ENTMCNC: 31.3 G/DL (ref 28.4–34.8)
MCV RBC AUTO: 99.5 FL (ref 82.6–102.9)
MONOCYTES # BLD: 7 % (ref 3–12)
NRBC AUTOMATED: 0 PER 100 WBC
PDW BLD-RTO: 12.4 % (ref 11.8–14.4)
PLATELET # BLD: 328 K/UL (ref 138–453)
PLATELET ESTIMATE: ABNORMAL
PMV BLD AUTO: 10.5 FL (ref 8.1–13.5)
RBC # BLD: 4.36 M/UL (ref 3.95–5.11)
RBC # BLD: ABNORMAL 10*6/UL
SEG NEUTROPHILS: 66 % (ref 36–65)
SEGMENTED NEUTROPHILS ABSOLUTE COUNT: 6.35 K/UL (ref 1.5–8.1)
WBC # BLD: 9.7 K/UL (ref 3.5–11.3)
WBC # BLD: ABNORMAL 10*3/UL

## 2021-11-13 LAB
ESTIMATED AVERAGE GLUCOSE: 258 MG/DL
HBA1C MFR BLD: 10.6 % (ref 4–6)

## 2021-11-16 ENCOUNTER — OFFICE VISIT (OUTPATIENT)
Dept: INTERNAL MEDICINE | Age: 56
End: 2021-11-16
Payer: COMMERCIAL

## 2021-11-16 VITALS
HEIGHT: 69 IN | BODY MASS INDEX: 24.07 KG/M2 | DIASTOLIC BLOOD PRESSURE: 77 MMHG | SYSTOLIC BLOOD PRESSURE: 175 MMHG | HEART RATE: 80 BPM

## 2021-11-16 DIAGNOSIS — Z23 NEED FOR INFLUENZA VACCINATION: ICD-10-CM

## 2021-11-16 DIAGNOSIS — E11.65 TYPE 2 DIABETES MELLITUS WITH HYPERGLYCEMIA, WITH LONG-TERM CURRENT USE OF INSULIN (HCC): Primary | ICD-10-CM

## 2021-11-16 DIAGNOSIS — Z79.4 TYPE 2 DIABETES MELLITUS WITH HYPERGLYCEMIA, WITH LONG-TERM CURRENT USE OF INSULIN (HCC): Primary | ICD-10-CM

## 2021-11-16 DIAGNOSIS — I10 ESSENTIAL HYPERTENSION: ICD-10-CM

## 2021-11-16 PROCEDURE — 99214 OFFICE O/P EST MOD 30 MIN: CPT | Performed by: INTERNAL MEDICINE

## 2021-11-16 PROCEDURE — 90686 IIV4 VACC NO PRSV 0.5 ML IM: CPT | Performed by: INTERNAL MEDICINE

## 2021-11-16 RX ORDER — LISINOPRIL AND HYDROCHLOROTHIAZIDE 20; 12.5 MG/1; MG/1
1 TABLET ORAL DAILY
Qty: 30 TABLET | Refills: 5 | Status: SHIPPED | OUTPATIENT
Start: 2021-11-16 | End: 2022-11-01 | Stop reason: SDUPTHER

## 2021-11-16 RX ORDER — GLUCOSAMINE HCL/CHONDROITIN SU 500-400 MG
1 CAPSULE ORAL 2 TIMES DAILY
Qty: 100 STRIP | Refills: 11 | Status: SHIPPED | OUTPATIENT
Start: 2021-11-16

## 2021-11-16 RX ORDER — INSULIN GLARGINE 100 [IU]/ML
20 INJECTION, SOLUTION SUBCUTANEOUS NIGHTLY
COMMUNITY
Start: 2021-10-21 | End: 2021-11-16 | Stop reason: SDUPTHER

## 2021-11-16 RX ORDER — INSULIN GLARGINE 100 [IU]/ML
30 INJECTION, SOLUTION SUBCUTANEOUS NIGHTLY
Qty: 5 PEN | Refills: 5 | Status: SHIPPED | OUTPATIENT
Start: 2021-11-16 | End: 2022-11-01 | Stop reason: SDUPTHER

## 2021-11-16 RX ORDER — CLOPIDOGREL BISULFATE 75 MG/1
TABLET ORAL
Qty: 30 TABLET | Refills: 5 | Status: SHIPPED | OUTPATIENT
Start: 2021-11-16 | End: 2022-05-25 | Stop reason: SDUPTHER

## 2021-11-16 ASSESSMENT — ENCOUNTER SYMPTOMS
ALLERGIC/IMMUNOLOGIC NEGATIVE: 1
EYES NEGATIVE: 1
RESPIRATORY NEGATIVE: 1
GASTROINTESTINAL NEGATIVE: 1

## 2021-11-16 NOTE — PATIENT INSTRUCTIONS
Medications e-scribe to pharmacy of pt's choice. Patient to return to clinic 2 weeks   AVS reviewed and given to pt. It is very important for your care that you keep your appointment. If for some reason you are unable to keep your appointment it is equally important that you call our office at [phone] to cancel your appointment and reschedule. Failure to do so may result in your termination from our practice.   MB

## 2021-11-16 NOTE — PROGRESS NOTES
Vaccine Information Sheet, \"Influenza - Inactivated\"  given to Subhash Dave, or parent/legal guardian of  Subhash Dave and verbalized understanding. Patient responses:    Is the person being vaccinated sick today? No    Does the person to be vaccinated have an allergy to a component of the vaccine? No    Has the person to be vaccinated ever had a reaction to the flu vaccine in the past?  No    Have you ever had Guillian Amasa Syndrome? No    Flu vaccine given per order. Please see immunization tab. The patient is a 89y Male complaining of weakness.

## 2021-12-15 ENCOUNTER — TELEPHONE (OUTPATIENT)
Dept: INTERNAL MEDICINE | Age: 56
End: 2021-12-15

## 2021-12-15 NOTE — TELEPHONE ENCOUNTER
Request for Overdue results. Next Visit Date:  Future Appointments   Date Time Provider Leonel Dhilloni   1/27/2022  1:00 PM Ruslan Santo MD [address]   Topic Date Due    Hepatitis B vaccine (1 of 3 - Risk 3-dose series) Never done    Cervical cancer screen  Never done    Colon cancer screen colonoscopy  Never done    Diabetic retinal exam  12/02/2016    Breast cancer screen  12/02/2017    Diabetic foot exam  07/30/2020    Shingles Vaccine (1 of 2) 09/07/2022 (Originally 1/1/2016)    COVID-19 Vaccine (3 - Booster for Pfizer series) 12/29/2021    A1C test (Diabetic or Prediabetic)  02/12/2022    Diabetic microalbuminuria test  05/12/2022    Lipid screen  05/12/2022    Potassium monitoring  05/12/2022    Creatinine monitoring  05/12/2022    DTaP/Tdap/Td vaccine (2 - Td or Tdap) 08/13/2025    Pneumococcal 0-64 years Vaccine (2 of 2 - PPSV23) 01/01/2031    Flu vaccine  Completed    Hepatitis C screen  Completed    HIV screen  Completed    Hepatitis A vaccine  Aged Out    Hib vaccine  Aged Out    Meningococcal (ACWY) vaccine  Aged Out       Hemoglobin A1C (%)   Date Value   11/12/2021 10.6 (H)   11/25/2019 9.3 (H)   07/30/2019 8.0             ( goal A1C is < 7)   Microalb/Crt.  Ratio (mcg/mg creat)   Date Value   05/12/2021 649 (H)     LDL Cholesterol (mg/dL)   Date Value   05/12/2021 138 (H)       (goal LDL is <100)   AST (U/L)   Date Value   11/26/2019 <5     ALT (U/L)   Date Value   11/26/2019 6     BUN (mg/dL)   Date Value   05/12/2021 12     BP Readings from Last 3 Encounters:   11/16/21 (!) 175/77   01/07/20 94/60   01/01/20 (!) 169/89          (goal 120/80)    All Future Testing planned in [facility]  Lab Frequency Next Occurrence   Hepatitis C Antibody Once 12/07/2021   Hemoglobin and Hematocrit, Blood, Post Transfusion POST TRANSFUSION          Patient Active Problem List:     Type 2 diabetes mellitus, with long-term current use of insulin (HCC) Essential hypertension     GERD (gastroesophageal reflux disease)     Claudication of lower extremity (HCC)     Panic attack     Leg pain, diffuse     Coronary artery disease involving native heart without angina pectoris     Diabetic polyneuropathy associated with type 2 diabetes mellitus (HCC)     Neuropathy     Diabetic ulcer of right midfoot associated with type 2 diabetes mellitus (HCC)     Ischemic ulcer of toe of right foot, limited to breakdown of skin (HCC)     Thrombocytosis     Anemia     Paraspinal abscess (HCC)     Neck pain     Penetrating foot wound, right, subsequent encounter     MSSA bacteremia     Dyslipidemia

## 2022-05-25 RX ORDER — CLOPIDOGREL BISULFATE 75 MG/1
TABLET ORAL
Qty: 30 TABLET | Refills: 5 | Status: SHIPPED | OUTPATIENT
Start: 2022-05-25 | End: 2022-11-01 | Stop reason: SDUPTHER

## 2022-05-25 NOTE — TELEPHONE ENCOUNTER
Pt calling asking for refill on Plavix. Pt last seen 11/16/21 and has canceled her last 2 appts. Pt is unable to make appt at this time. Medication pended. Health Maintenance   Topic Date Due    Hepatitis B vaccine (1 of 3 - Risk 3-dose series) Never done    Cervical cancer screen  Never done    Colorectal Cancer Screen  Never done    Diabetic retinal exam  12/02/2016    Breast cancer screen  12/02/2017    Diabetic foot exam  07/30/2020    COVID-19 Vaccine (3 - Booster for Pfizer series) 11/29/2021    A1C test (Diabetic or Prediabetic)  02/12/2022    Diabetic microalbuminuria test  05/12/2022    Lipids  05/12/2022    Shingles vaccine (1 of 2) 09/07/2022 (Originally 1/1/2016)    Depression Screen  09/07/2022    DTaP/Tdap/Td vaccine (2 - Td or Tdap) 08/13/2025    Pneumococcal 0-64 years Vaccine (3 - PPSV23 or PCV20) 01/01/2031    Flu vaccine  Completed    Hepatitis C screen  Completed    HIV screen  Completed    Hepatitis A vaccine  Aged Out    Hib vaccine  Aged Out    Meningococcal (ACWY) vaccine  Aged Out             (applicable per patient's age: Cancer Screenings, Depression Screening, Fall Risk Screening, Immunizations)    Hemoglobin A1C (%)   Date Value   11/12/2021 10.6 (H)   11/25/2019 9.3 (H)   07/30/2019 8.0     Microalb/Crt. Ratio (mcg/mg creat)   Date Value   05/12/2021 649 (H)     LDL Cholesterol (mg/dL)   Date Value   05/12/2021 138 (H)     AST (U/L)   Date Value   11/26/2019 <5     ALT (U/L)   Date Value   11/26/2019 6     BUN (mg/dL)   Date Value   05/12/2021 12      (goal A1C is < 7)   (goal LDL is <100) need 30-50% reduction from baseline     BP Readings from Last 3 Encounters:   11/16/21 (!) 175/77   01/07/20 94/60   01/01/20 (!) 169/89    (goal /80)      All Future Testing planned in [facility]:  Lab Frequency Next Occurrence   Hemoglobin and Hematocrit, Blood, Post Transfusion POST TRANSFUSION        Next Visit Date:  No future appointments.          Patient Active

## 2022-06-02 DIAGNOSIS — I73.9 CLAUDICATION OF LEFT LOWER EXTREMITY (HCC): ICD-10-CM

## 2022-06-03 RX ORDER — ATORVASTATIN CALCIUM 40 MG/1
TABLET, FILM COATED ORAL
Qty: 30 TABLET | Refills: 6 | OUTPATIENT
Start: 2022-06-03

## 2022-06-29 RX ORDER — PEN NEEDLE, DIABETIC 31 G X1/4"
NEEDLE, DISPOSABLE MISCELLANEOUS
Qty: 100 EACH | Refills: 3 | OUTPATIENT
Start: 2022-06-29

## 2022-10-03 DIAGNOSIS — I73.9 CLAUDICATION OF LEFT LOWER EXTREMITY (HCC): ICD-10-CM

## 2022-10-03 NOTE — TELEPHONE ENCOUNTER
E-scribe request for atrovasttin. Please review and e-scribe if applicable. Next Visit Date:  No future appointments. Health Maintenance   Topic Date Due    Cervical cancer screen  Never done    Colorectal Cancer Screen  Never done    Shingles vaccine (1 of 2) Never done    Diabetic retinal exam  12/02/2016    Breast cancer screen  12/02/2017    Diabetic foot exam  07/30/2020    COVID-19 Vaccine (3 - Booster for Pfizer series) 11/29/2021    A1C test (Diabetic or Prediabetic)  02/12/2022    Diabetic microalbuminuria test  05/12/2022    Lipids  05/12/2022    Flu vaccine (1) 08/01/2022    Depression Screen  09/07/2022    DTaP/Tdap/Td vaccine (2 - Td or Tdap) 08/13/2025    Pneumococcal 0-64 years Vaccine (3 - PPSV23 or PCV20) 01/01/2031    Hepatitis C screen  Completed    HIV screen  Completed    Hepatitis A vaccine  Aged Out    Hepatitis B vaccine  Aged Out    Hib vaccine  Aged Out    Meningococcal (ACWY) vaccine  Aged Out               (applicable per patient's age: Cancer Screenings, Depression Screening, Fall Risk Screening, Immunizations)    Hemoglobin A1C (%)   Date Value   11/12/2021 10.6 (H)   11/25/2019 9.3 (H)   07/30/2019 8.0     Microalb/Crt.  Ratio (mcg/mg creat)   Date Value   05/12/2021 649 (H)     LDL Cholesterol (mg/dL)   Date Value   05/12/2021 138 (H)     AST (U/L)   Date Value   11/26/2019 <5     ALT (U/L)   Date Value   11/26/2019 6     BUN (mg/dL)   Date Value   05/12/2021 12      (goal A1C is < 7)   (goal LDL is <100) need 30-50% reduction from baseline     BP Readings from Last 3 Encounters:   11/16/21 (!) 175/77   01/07/20 94/60   01/01/20 (!) 169/89    (goal /80)      All Future Testing planned in [facility]:  Lab Frequency Next Occurrence   Hemoglobin and Hematocrit, Blood, Post Transfusion POST TRANSFUSION             Patient Active Problem List:     Type 2 diabetes mellitus, with long-term current use of insulin (HCC)     Essential hypertension     GERD (gastroesophageal reflux disease)     Claudication of lower extremity (HCC)     Panic attack     Leg pain, diffuse     Coronary artery disease involving native heart without angina pectoris     Diabetic polyneuropathy associated with type 2 diabetes mellitus (HCC)     Neuropathy     Diabetic ulcer of right midfoot associated with type 2 diabetes mellitus (HCC)     Ischemic ulcer of toe of right foot, limited to breakdown of skin (HCC)     Thrombocytosis     Anemia     Paraspinal abscess (HCC)     Neck pain     Penetrating foot wound, right, subsequent encounter     MSSA bacteremia     Dyslipidemia

## 2022-10-04 RX ORDER — ATORVASTATIN CALCIUM 40 MG/1
TABLET, FILM COATED ORAL
Qty: 30 TABLET | Refills: 6 | Status: SHIPPED | OUTPATIENT
Start: 2022-10-04

## 2022-10-07 NOTE — TELEPHONE ENCOUNTER
Request for metformin. Next Visit Date:11/1/22  Future Appointments   Date Time Provider Leonel Dhilloni   11/1/2022  1:40 PM Nguyen Licona MD [facility] IM [address] Maintenance   Topic Date Due    Cervical cancer screen  Never done    Colorectal Cancer Screen  Never done    Shingles vaccine (1 of 2) Never done    Diabetic retinal exam  12/02/2016    Breast cancer screen  12/02/2017    Diabetic foot exam  07/30/2020    COVID-19 Vaccine (3 - Booster for Pfizer series) 11/29/2021    A1C test (Diabetic or Prediabetic)  02/12/2022    Diabetic microalbuminuria test  05/12/2022    Lipids  05/12/2022    Flu vaccine (1) 08/01/2022    Depression Screen  09/07/2022    DTaP/Tdap/Td vaccine (2 - Td or Tdap) 08/13/2025    Pneumococcal 0-64 years Vaccine (3 - PPSV23 or PCV20) 01/01/2031    Hepatitis C screen  Completed    HIV screen  Completed    Hepatitis A vaccine  Aged Out    Hepatitis B vaccine  Aged Out    Hib vaccine  Aged Out    Meningococcal (ACWY) vaccine  Aged Out       Hemoglobin A1C (%)   Date Value   11/12/2021 10.6 (H)   11/25/2019 9.3 (H)   07/30/2019 8.0             ( goal A1C is < 7)   Microalb/Crt.  Ratio (mcg/mg creat)   Date Value   05/12/2021 649 (H)     LDL Cholesterol (mg/dL)   Date Value   05/12/2021 138 (H)       (goal LDL is <100)   AST (U/L)   Date Value   11/26/2019 <5     ALT (U/L)   Date Value   11/26/2019 6     BUN (mg/dL)   Date Value   05/12/2021 12     BP Readings from Last 3 Encounters:   11/16/21 (!) 175/77   01/07/20 94/60   01/01/20 (!) 169/89          (goal 120/80)    All Future Testing planned in [facility]  Lab Frequency Next Occurrence   Hemoglobin and Hematocrit, Blood, Post Transfusion POST TRANSFUSION          Patient Active Problem List:     Type 2 diabetes mellitus, with long-term current use of insulin (HCC)     Essential hypertension     GERD (gastroesophageal reflux disease)     Claudication of lower extremity (HCC)     Panic attack     Leg pain, diffuse     Coronary artery disease involving native heart without angina pectoris     Diabetic polyneuropathy associated with type 2 diabetes mellitus (HCC)     Neuropathy     Diabetic ulcer of right midfoot associated with type 2 diabetes mellitus (HCC)     Ischemic ulcer of toe of right foot, limited to breakdown of skin (HCC)     Thrombocytosis     Anemia     Paraspinal abscess (HCC)     Neck pain     Penetrating foot wound, right, subsequent encounter     MSSA bacteremia     Dyslipidemia

## 2022-10-07 NOTE — TELEPHONE ENCOUNTER
Refill request for metFORMIN (GLUCOPHAGE) 1000 MG tablet. If appropriate please send medication(s) to patients pharmacy. Next appt: 11/1/2022      Health Maintenance   Topic Date Due    Cervical cancer screen  Never done    Colorectal Cancer Screen  Never done    Shingles vaccine (1 of 2) Never done    Diabetic retinal exam  12/02/2016    Breast cancer screen  12/02/2017    Diabetic foot exam  07/30/2020    COVID-19 Vaccine (3 - Booster for Pfizer series) 11/29/2021    A1C test (Diabetic or Prediabetic)  02/12/2022    Diabetic microalbuminuria test  05/12/2022    Lipids  05/12/2022    Flu vaccine (1) 08/01/2022    Depression Screen  09/07/2022    DTaP/Tdap/Td vaccine (2 - Td or Tdap) 08/13/2025    Pneumococcal 0-64 years Vaccine (3 - PPSV23 or PCV20) 01/01/2031    Hepatitis C screen  Completed    HIV screen  Completed    Hepatitis A vaccine  Aged Out    Hepatitis B vaccine  Aged Out    Hib vaccine  Aged Out    Meningococcal (ACWY) vaccine  Aged Out       Hemoglobin A1C (%)   Date Value   11/12/2021 10.6 (H)   11/25/2019 9.3 (H)   07/30/2019 8.0             ( goal A1C is < 7)   Microalb/Crt.  Ratio (mcg/mg creat)   Date Value   05/12/2021 649 (H)     LDL Cholesterol (mg/dL)   Date Value   05/12/2021 138 (H)       (goal LDL is <100)   AST (U/L)   Date Value   11/26/2019 <5     ALT (U/L)   Date Value   11/26/2019 6     BUN (mg/dL)   Date Value   05/12/2021 12     BP Readings from Last 3 Encounters:   11/16/21 (!) 175/77   01/07/20 94/60   01/01/20 (!) 169/89          (goal 120/80)          Patient Active Problem List:     Type 2 diabetes mellitus, with long-term current use of insulin (HCC)     Essential hypertension     GERD (gastroesophageal reflux disease)     Claudication of lower extremity (HCC)     Panic attack     Leg pain, diffuse     Coronary artery disease involving native heart without angina pectoris     Diabetic polyneuropathy associated with type 2 diabetes mellitus (HCC)     Neuropathy     Diabetic ulcer of right midfoot associated with type 2 diabetes mellitus ([facility] [address]     Ischemic ulcer of toe of right foot, limited to breakdown of skin (HCC)     Thrombocytosis     Anemia     Paraspinal abscess (HCC)     Neck pain     Penetrating foot wound, right, subsequent encounter     MSSA bacteremia     Dyslipidemia

## 2022-11-01 ENCOUNTER — TELEMEDICINE (OUTPATIENT)
Dept: INTERNAL MEDICINE | Age: 57
End: 2022-11-01
Payer: COMMERCIAL

## 2022-11-01 DIAGNOSIS — I10 ESSENTIAL HYPERTENSION: ICD-10-CM

## 2022-11-01 DIAGNOSIS — E11.65 TYPE 2 DIABETES MELLITUS WITH HYPERGLYCEMIA, WITH LONG-TERM CURRENT USE OF INSULIN (HCC): ICD-10-CM

## 2022-11-01 DIAGNOSIS — Z79.4 TYPE 2 DIABETES MELLITUS WITH HYPERGLYCEMIA, WITH LONG-TERM CURRENT USE OF INSULIN (HCC): ICD-10-CM

## 2022-11-01 DIAGNOSIS — I25.10 CORONARY ARTERY DISEASE INVOLVING NATIVE HEART WITHOUT ANGINA PECTORIS, UNSPECIFIED VESSEL OR LESION TYPE: Primary | ICD-10-CM

## 2022-11-01 PROBLEM — R78.81 MSSA BACTEREMIA: Status: RESOLVED | Noted: 2019-12-05 | Resolved: 2022-11-01

## 2022-11-01 PROBLEM — L97.419 DIABETIC ULCER OF RIGHT MIDFOOT ASSOCIATED WITH TYPE 2 DIABETES MELLITUS (HCC): Chronic | Status: RESOLVED | Noted: 2019-08-21 | Resolved: 2022-11-01

## 2022-11-01 PROBLEM — E11.621 DIABETIC ULCER OF RIGHT MIDFOOT ASSOCIATED WITH TYPE 2 DIABETES MELLITUS (HCC): Chronic | Status: RESOLVED | Noted: 2019-08-21 | Resolved: 2022-11-01

## 2022-11-01 PROBLEM — D75.839 THROMBOCYTOSIS: Status: RESOLVED | Noted: 2019-11-25 | Resolved: 2022-11-01

## 2022-11-01 PROBLEM — B95.61 MSSA BACTEREMIA: Status: RESOLVED | Noted: 2019-12-05 | Resolved: 2022-11-01

## 2022-11-01 PROBLEM — M46.20 PARASPINAL ABSCESS (HCC): Status: RESOLVED | Noted: 2019-11-29 | Resolved: 2022-11-01

## 2022-11-01 PROCEDURE — 99441 PR PHYS/QHP TELEPHONE EVALUATION 5-10 MIN: CPT | Performed by: INTERNAL MEDICINE

## 2022-11-01 RX ORDER — CLOPIDOGREL BISULFATE 75 MG/1
TABLET ORAL
Qty: 30 TABLET | Refills: 5 | Status: SHIPPED | OUTPATIENT
Start: 2022-11-01 | End: 2022-11-22

## 2022-11-01 RX ORDER — CLOPIDOGREL BISULFATE 75 MG/1
TABLET ORAL
Qty: 30 TABLET | Refills: 5 | Status: CANCELLED | OUTPATIENT
Start: 2022-11-01

## 2022-11-01 RX ORDER — LISINOPRIL AND HYDROCHLOROTHIAZIDE 20; 12.5 MG/1; MG/1
1 TABLET ORAL DAILY
Qty: 30 TABLET | Refills: 5 | Status: SHIPPED | OUTPATIENT
Start: 2022-11-01

## 2022-11-01 RX ORDER — INSULIN GLARGINE 100 [IU]/ML
30 INJECTION, SOLUTION SUBCUTANEOUS NIGHTLY
Qty: 3 ML | Refills: 5 | Status: SHIPPED | OUTPATIENT
Start: 2022-11-01

## 2022-11-01 RX ORDER — CLOPIDOGREL BISULFATE 75 MG/1
TABLET ORAL
Qty: 30 TABLET | Refills: 5 | OUTPATIENT
Start: 2022-11-01

## 2022-11-01 SDOH — ECONOMIC STABILITY: FOOD INSECURITY: WITHIN THE PAST 12 MONTHS, YOU WORRIED THAT YOUR FOOD WOULD RUN OUT BEFORE YOU GOT MONEY TO BUY MORE.: NEVER TRUE

## 2022-11-01 SDOH — ECONOMIC STABILITY: FOOD INSECURITY: WITHIN THE PAST 12 MONTHS, THE FOOD YOU BOUGHT JUST DIDN'T LAST AND YOU DIDN'T HAVE MONEY TO GET MORE.: NEVER TRUE

## 2022-11-01 ASSESSMENT — PATIENT HEALTH QUESTIONNAIRE - PHQ9
SUM OF ALL RESPONSES TO PHQ QUESTIONS 1-9: 0
1. LITTLE INTEREST OR PLEASURE IN DOING THINGS: 0
2. FEELING DOWN, DEPRESSED OR HOPELESS: 0
SUM OF ALL RESPONSES TO PHQ9 QUESTIONS 1 & 2: 0

## 2022-11-01 ASSESSMENT — SOCIAL DETERMINANTS OF HEALTH (SDOH): HOW HARD IS IT FOR YOU TO PAY FOR THE VERY BASICS LIKE FOOD, HOUSING, MEDICAL CARE, AND HEATING?: NOT HARD AT ALL

## 2022-11-01 NOTE — PROGRESS NOTES
Caro Buck is a 65 y.o. female evaluated via telephone on 11/1/2022 for Diabetes (Pt took medication today) and Health Maintenance (Pt refuesd flu, shingrix,, pt states she going to schedule appt soon for pap smear, pt refused mammogram,)  . Documentation:  Patient has a complicated history of uncontrolled diabetes mellitus type 2 along with coronary disease and severe peripheral vascular disease. Patient had in early 2020 ischemic ulcers of the toes on her right foot requiring resection. Patient also in early 2020 had a paraspinal abscess for which she saw infectious disease and was treated with long-term antibiotics. She ultimately had amputation and now has prosthesis which she reports does not work well. Is wheelchair bound. BP is well controlled. Has cuff at home  BS running in 100s   She is adherent with her meds     Diagnosis Orders   1. Coronary artery disease involving native heart without angina pectoris, unspecified vessel or lesion type  clopidogrel (PLAVIX) 75 MG tablet      2. Type 2 diabetes mellitus with hyperglycemia, with long-term current use of insulin ([facility])  Lipid, Fasting    Microalbumin, Ur    Hemoglobin A1C    LANTUS SOLOSTAR 100 UNIT/ML injection pen      3. Essential hypertension        Refused colon cancer screening last year  Refused PAP      Total Time: minutes: 5-10 minutes    Crao Buck was evaluated through a synchronous (real-time) audio encounter. Patient identification was verified at the start of the visit. She (or guardian if applicable) is aware that this is a billable service, which includes applicable co-pays. This visit was conducted with the patient's (and/or legal guardian's) verbal consent. She has not had a related appointment within my department in the past 7 days or scheduled within the next 24 hours. The patient was located at Home: [address] [id number]. The provider was located at Home ([address]: [place].     Note: not billable if this call serves to triage the patient into an appointment for the relevant concern    Steve Hill MD

## 2022-11-01 NOTE — TELEPHONE ENCOUNTER
Request for plavix, lisinopril-hctz and netfirmin      Next Visit Date:11/1/22  No future appointments. Health Maintenance   Topic Date Due    Hepatitis B vaccine (1 of 3 - Risk 3-dose series) Never done    Cervical cancer screen  Never done    Colorectal Cancer Screen  Never done    Shingles vaccine (1 of 2) Never done    Diabetic retinal exam  12/02/2016    Breast cancer screen  12/02/2017    Diabetic foot exam  07/30/2020    COVID-19 Vaccine (3 - Booster for Pfizer series) 08/24/2021    A1C test (Diabetic or Prediabetic)  02/12/2022    Diabetic microalbuminuria test  05/12/2022    Lipids  05/12/2022    Flu vaccine (1) 08/01/2022    Depression Screen  09/07/2022    DTaP/Tdap/Td vaccine (2 - Td or Tdap) 08/13/2025    Pneumococcal 0-64 years Vaccine (3 - PPSV23 if available, else PCV20) 01/01/2031    Hepatitis C screen  Completed    HIV screen  Completed    Hepatitis A vaccine  Aged Out    Hib vaccine  Aged Out    Meningococcal (ACWY) vaccine  Aged Out       Hemoglobin A1C (%)   Date Value   11/12/2021 10.6 (H)   11/25/2019 9.3 (H)   07/30/2019 8.0             ( goal A1C is < 7)   Microalb/Crt.  Ratio (mcg/mg creat)   Date Value   05/12/2021 649 (H)     LDL Cholesterol (mg/dL)   Date Value   05/12/2021 138 (H)       (goal LDL is <100)   AST (U/L)   Date Value   11/26/2019 <5     ALT (U/L)   Date Value   11/26/2019 6     BUN (mg/dL)   Date Value   05/12/2021 12     BP Readings from Last 3 Encounters:   11/16/21 (!) 175/77   01/07/20 94/60   01/01/20 (!) 169/89          (goal 120/80)    All Future Testing planned in [facility]  Lab Frequency Next Occurrence   Hemoglobin and Hematocrit, Blood, Post Transfusion POST TRANSFUSION          Patient Active Problem List:     Type 2 diabetes mellitus, with long-term current use of insulin (HCC)     Essential hypertension     GERD (gastroesophageal reflux disease)     Claudication of lower extremity (HCC)     Panic attack     Leg pain, diffuse     Coronary artery disease involving native heart without angina pectoris     Diabetic polyneuropathy associated with type 2 diabetes mellitus (HCC)     Neuropathy     Diabetic ulcer of right midfoot associated with type 2 diabetes mellitus (HCC)     Ischemic ulcer of toe of right foot, limited to breakdown of skin (HCC)     Thrombocytosis     Anemia     Paraspinal abscess (HCC)     Neck pain     Penetrating foot wound, right, subsequent encounter     MSSA bacteremia     Dyslipidemia

## 2022-11-18 DIAGNOSIS — I25.10 CORONARY ARTERY DISEASE INVOLVING NATIVE HEART WITHOUT ANGINA PECTORIS, UNSPECIFIED VESSEL OR LESION TYPE: ICD-10-CM

## 2022-11-18 NOTE — TELEPHONE ENCOUNTER
Refill request for clopidogrel (PLAVIX) 75 MG tablet. If appropriate please send medication(s) to patients pharmacy. Next appt: Patient is currently on wait list for provider. Last appt: 11/1/2022    Health Maintenance   Topic Date Due    Hepatitis B vaccine (1 of 3 - Risk 3-dose series) Never done    Cervical cancer screen  Never done    Colorectal Cancer Screen  Never done    Shingles vaccine (1 of 2) Never done    Diabetic retinal exam  12/02/2016    Breast cancer screen  12/02/2017    Diabetic foot exam  07/30/2020    COVID-19 Vaccine (3 - Booster for Pfizer series) 08/24/2021    Diabetic microalbuminuria test  05/12/2022    Lipids  05/12/2022    Flu vaccine (1) 08/01/2022    A1C test (Diabetic or Prediabetic)  11/12/2022    Depression Screen  11/01/2023    DTaP/Tdap/Td vaccine (2 - Td or Tdap) 08/13/2025    Pneumococcal 0-64 years Vaccine (3 - PPSV23 if available, else PCV20) 01/01/2031    Hepatitis C screen  Completed    HIV screen  Completed    Hepatitis A vaccine  Aged Out    Hib vaccine  Aged Out    Meningococcal (ACWY) vaccine  Aged Out       Hemoglobin A1C (%)   Date Value   11/12/2021 10.6 (H)   11/25/2019 9.3 (H)   07/30/2019 8.0             ( goal A1C is < 7)   Microalb/Crt.  Ratio (mcg/mg creat)   Date Value   05/12/2021 649 (H)     LDL Cholesterol (mg/dL)   Date Value   05/12/2021 138 (H)       (goal LDL is <100)   AST (U/L)   Date Value   11/26/2019 <5     ALT (U/L)   Date Value   11/26/2019 6     BUN (mg/dL)   Date Value   05/12/2021 12     BP Readings from Last 3 Encounters:   11/16/21 (!) 175/77   01/07/20 94/60   01/01/20 (!) 169/89          (goal 120/80)          Patient Active Problem List:     Type 2 diabetes mellitus, with long-term current use of insulin (HCC)     Essential hypertension     GERD (gastroesophageal reflux disease)     Claudication of lower extremity (HCC)     Coronary artery disease involving native heart without angina pectoris     Diabetic polyneuropathy associated with type 2 diabetes mellitus (HCC)     Neuropathy     Ischemic ulcer of toe of right foot, limited to breakdown of skin ([address]     Anemia     Penetrating foot wound, right, subsequent encounter     Dyslipidemia

## 2022-11-22 RX ORDER — CLOPIDOGREL BISULFATE 75 MG/1
TABLET ORAL
Qty: 30 TABLET | Refills: 5 | Status: SHIPPED | OUTPATIENT
Start: 2022-11-22

## 2023-03-22 DIAGNOSIS — I25.10 CORONARY ARTERY DISEASE INVOLVING NATIVE HEART WITHOUT ANGINA PECTORIS, UNSPECIFIED VESSEL OR LESION TYPE: ICD-10-CM

## 2023-03-22 NOTE — TELEPHONE ENCOUNTER
LM for pt to cb and confirm if switching pharmacy to [facility] and not [facility] which still show refills for clopidogrel
Transfusion POST TRANSFUSION        Next Visit Date:  No future appointments.          Patient Active Problem List:     Type 2 diabetes mellitus, with long-term current use of insulin (HCC)     Essential hypertension     GERD (gastroesophageal reflux disease)     Claudication of lower extremity (HCC)     Coronary artery disease involving native heart without angina pectoris     Diabetic polyneuropathy associated with type 2 diabetes mellitus (HCC)     Neuropathy     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

## 2023-03-23 RX ORDER — CLOPIDOGREL BISULFATE 75 MG/1
TABLET ORAL
Qty: 90 TABLET | Refills: 3 | Status: SHIPPED | OUTPATIENT
Start: 2023-03-23

## 2023-04-18 DIAGNOSIS — I25.10 CORONARY ARTERY DISEASE INVOLVING NATIVE HEART WITHOUT ANGINA PECTORIS, UNSPECIFIED VESSEL OR LESION TYPE: ICD-10-CM

## 2023-04-18 RX ORDER — CLOPIDOGREL BISULFATE 75 MG/1
TABLET ORAL
Qty: 90 TABLET | Refills: 3 | Status: SHIPPED | OUTPATIENT
Start: 2023-04-18

## 2023-04-18 NOTE — TELEPHONE ENCOUNTER
11/01/2022   Hemoglobin A1C Once 11/01/2022   Hemoglobin and Hematocrit, Blood, Post Transfusion POST TRANSFUSION        Next Visit Date:  Future Appointments   Date Time Provider Leonel Stephens   5/23/2023  1:20 PM Naomie Heck MD [id number]            Patient Active Problem List:     Type 2 diabetes mellitus, with long-term current use of insulin ([facility] [address]     Essential hypertension     GERD (gastroesophageal reflux disease)     Claudication of lower extremity (HCC)     Coronary artery disease involving native heart without angina pectoris     Diabetic polyneuropathy associated with type 2 diabetes mellitus (HCC)     Neuropathy     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

## 2023-05-08 DIAGNOSIS — I73.9 CLAUDICATION OF LEFT LOWER EXTREMITY (HCC): ICD-10-CM

## 2023-05-08 DIAGNOSIS — I10 ESSENTIAL HYPERTENSION: ICD-10-CM

## 2023-05-08 NOTE — TELEPHONE ENCOUNTER
Last visit: 11/1/22  Last Med refill:   Does patient have enough medication for 72 hours: No:     Next Visit Date:  Future Appointments   Date Time Provider Leonel Stephens   5/23/2023  1:20 PM Beryle Boroughs, MD [facility] IM [address] Maintenance   Topic Date Due    Hepatitis B vaccine (1 of 3 - Risk 3-dose series) Never done    Cervical cancer screen  Never done    Colorectal Cancer Screen  Never done    Shingles vaccine (1 of 2) Never done    Diabetic retinal exam  12/02/2016    Breast cancer screen  12/02/2017    Diabetic foot exam  07/30/2020    COVID-19 Vaccine (3 - Booster for Pfizer series) 08/24/2021    Diabetic Alb to Cr ratio (uACR) test  05/12/2022    Lipids  05/12/2022    GFR test (Diabetes, CKD 3-4, OR last GFR 15-59)  05/12/2022    A1C test (Diabetic or Prediabetic)  11/12/2022    Flu vaccine (Season Ended) 08/01/2023    Depression Screen  11/01/2023    DTaP/Tdap/Td vaccine (2 - Td or Tdap) 08/13/2025    Pneumococcal 0-64 years Vaccine (3 - PPSV23 if available, else PCV20) 01/01/2031    Hepatitis C screen  Completed    HIV screen  Completed    Hepatitis A vaccine  Aged Out    Hib vaccine  Aged Out    Meningococcal (ACWY) vaccine  Aged Out       Hemoglobin A1C (%)   Date Value   11/12/2021 10.6 (H)   11/25/2019 9.3 (H)   07/30/2019 8.0             ( goal A1C is < 7)   Microalb/Crt.  Ratio (mcg/mg creat)   Date Value   05/12/2021 649 (H)     LDL Cholesterol (mg/dL)   Date Value   05/12/2021 138 (H)   07/11/2019 90       (goal LDL is <100)   AST (U/L)   Date Value   11/26/2019 <5     ALT (U/L)   Date Value   11/26/2019 6     BUN (mg/dL)   Date Value   05/12/2021 12     BP Readings from Last 3 Encounters:   11/16/21 (!) 175/77   01/07/20 94/60   01/01/20 (!) 169/89          (goal 120/80)    All Future Testing planned in [facility]  Lab Frequency Next Occurrence   Lipid, Fasting Once 11/01/2022   Microalbumin, Ur Once 11/01/2022   Hemoglobin A1C Once 11/01/2022   Hemoglobin and Hematocrit, Blood, Post

## 2023-05-09 RX ORDER — ATORVASTATIN CALCIUM 40 MG/1
TABLET, FILM COATED ORAL
Qty: 30 TABLET | Refills: 5 | Status: SHIPPED | OUTPATIENT
Start: 2023-05-09

## 2023-05-09 RX ORDER — LISINOPRIL AND HYDROCHLOROTHIAZIDE 20; 12.5 MG/1; MG/1
TABLET ORAL
Qty: 30 TABLET | Refills: 5 | Status: SHIPPED | OUTPATIENT
Start: 2023-05-09

## 2023-10-13 DIAGNOSIS — I25.10 CORONARY ARTERY DISEASE INVOLVING NATIVE HEART WITHOUT ANGINA PECTORIS, UNSPECIFIED VESSEL OR LESION TYPE: ICD-10-CM

## 2023-10-13 NOTE — TELEPHONE ENCOUNTER
.. Request for   Requested Prescriptions     Pending Prescriptions Disp Refills    clopidogrel (PLAVIX) 75 MG tablet 90 tablet 3     Sig: TAKE 1 TABLET BY MOUTH DAILY    . Please review and e-scribe to pharmacy listed in chart if appropriate. Thank you.       Last Visit Date: 11/1/2022  Next Visit Date: 11/7/2023    Future Appointments   Date Time Provider [address]   11/7/2023  1:40 PM Jonah Saint, MD [facility]  [address] Maintenance   Topic Date Due    Hepatitis B vaccine (1 of 3 - 3-dose series) Never done    Cervical cancer screen  Never done    Colorectal Cancer Screen  Never done    Shingles vaccine (1 of 2) Never done    Diabetic retinal exam  12/02/2016    Breast cancer screen  12/02/2017    Diabetic foot exam  07/30/2020    COVID-19 Vaccine (3 - Pfizer series) 08/24/2021    Diabetic Alb to Cr ratio (uACR) test  05/12/2022    Lipids  05/12/2022    GFR test (Diabetes, CKD 3-4, OR last GFR 15-59)  05/12/2022    A1C test (Diabetic or Prediabetic)  11/12/2022    Flu vaccine (1) 08/01/2023    Depression Screen  11/01/2023    DTaP/Tdap/Td vaccine (2 - Td or Tdap) 08/13/2025    Pneumococcal 0-64 years Vaccine (3 - PPSV23 or PCV20) 01/01/2031    Hepatitis C screen  Completed    HIV screen  Completed    Hepatitis A vaccine  Aged Out    Hib vaccine  Aged Out    Meningococcal (ACWY) vaccine  Aged Out       Hemoglobin A1C (%)   Date Value   11/12/2021 10.6 (H)   11/25/2019 9.3 (H)   07/30/2019 8.0             ( goal A1C is < 7)   No components found for: \"LABMICR\"  LDL Cholesterol (mg/dL)   Date Value   05/12/2021 138 (H)       (goal LDL is <100)   AST (U/L)   Date Value   11/26/2019 <5     ALT (U/L)   Date Value   11/26/2019 6     BUN (mg/dL)   Date Value   05/12/2021 12     BP Readings from Last 3 Encounters:   11/16/21 (!) 175/77   01/07/20 94/60   01/01/20 (!) 169/89          (goal 120/80)    All Future Testing planned in [facility]  Lab Frequency Next Occurrence   Lipid, Fasting Once 11/01/2022

## 2023-10-14 RX ORDER — CLOPIDOGREL BISULFATE 75 MG/1
TABLET ORAL
Qty: 90 TABLET | Refills: 3 | Status: SHIPPED | OUTPATIENT
Start: 2023-10-14

## 2023-11-07 ENCOUNTER — HOSPITAL ENCOUNTER (OUTPATIENT)
Age: 58
Setting detail: SPECIMEN
Discharge: HOME | End: 2023-11-07

## 2023-11-07 ENCOUNTER — OFFICE VISIT (OUTPATIENT)
Dept: INTERNAL MEDICINE | Age: 58
End: 2023-11-07
Payer: COMMERCIAL

## 2023-11-07 VITALS
BODY MASS INDEX: 24.07 KG/M2 | HEIGHT: 69 IN | OXYGEN SATURATION: 99 % | HEART RATE: 82 BPM | TEMPERATURE: 98.4 F | DIASTOLIC BLOOD PRESSURE: 68 MMHG | SYSTOLIC BLOOD PRESSURE: 170 MMHG

## 2023-11-07 DIAGNOSIS — E11.65 TYPE 2 DIABETES MELLITUS WITH HYPERGLYCEMIA, WITH LONG-TERM CURRENT USE OF INSULIN (HCC): Primary | ICD-10-CM

## 2023-11-07 DIAGNOSIS — Z79.4 TYPE 2 DIABETES MELLITUS WITH HYPERGLYCEMIA, WITH LONG-TERM CURRENT USE OF INSULIN (HCC): ICD-10-CM

## 2023-11-07 DIAGNOSIS — I10 ESSENTIAL HYPERTENSION: ICD-10-CM

## 2023-11-07 DIAGNOSIS — S88.911A AMPUTATED RIGHT LEG (HCC): ICD-10-CM

## 2023-11-07 DIAGNOSIS — E11.65 TYPE 2 DIABETES MELLITUS WITH HYPERGLYCEMIA, WITH LONG-TERM CURRENT USE OF INSULIN (HCC): ICD-10-CM

## 2023-11-07 DIAGNOSIS — K21.9 GASTROESOPHAGEAL REFLUX DISEASE WITHOUT ESOPHAGITIS: Chronic | ICD-10-CM

## 2023-11-07 DIAGNOSIS — Z12.11 COLON CANCER SCREENING: ICD-10-CM

## 2023-11-07 DIAGNOSIS — Z12.31 ENCOUNTER FOR SCREENING MAMMOGRAM FOR MALIGNANT NEOPLASM OF BREAST: ICD-10-CM

## 2023-11-07 DIAGNOSIS — Z79.4 TYPE 2 DIABETES MELLITUS WITH HYPERGLYCEMIA, WITH LONG-TERM CURRENT USE OF INSULIN (HCC): Primary | ICD-10-CM

## 2023-11-07 LAB
BASOPHILS # BLD: 0.09 K/UL (ref 0–0.2)
BASOPHILS NFR BLD: 1 % (ref 0–2)
EOSINOPHIL # BLD: 0.33 K/UL (ref 0–0.44)
EOSINOPHILS RELATIVE PERCENT: 4 % (ref 1–4)
ERYTHROCYTE [DISTWIDTH] IN BLOOD BY AUTOMATED COUNT: 13.2 % (ref 11.8–14.4)
HCT VFR BLD AUTO: 36.2 % (ref 36.3–47.1)
HGB BLD-MCNC: 11.5 G/DL (ref 11.9–15.1)
IMM GRANULOCYTES # BLD AUTO: 0.11 K/UL (ref 0–0.3)
IMM GRANULOCYTES NFR BLD: 1 %
LYMPHOCYTES NFR BLD: 2.38 K/UL (ref 1.1–3.7)
LYMPHOCYTES RELATIVE PERCENT: 26 % (ref 24–43)
MCH RBC QN AUTO: 31.9 PG (ref 25.2–33.5)
MCHC RBC AUTO-ENTMCNC: 31.8 G/DL (ref 28.4–34.8)
MCV RBC AUTO: 100.3 FL (ref 82.6–102.9)
MONOCYTES NFR BLD: 0.63 K/UL (ref 0.1–1.2)
MONOCYTES NFR BLD: 7 % (ref 3–12)
NEUTROPHILS NFR BLD: 61 % (ref 36–65)
NEUTS SEG NFR BLD: 5.67 K/UL (ref 1.5–8.1)
NRBC BLD-RTO: 0 PER 100 WBC
PLATELET # BLD AUTO: 343 K/UL (ref 138–453)
PMV BLD AUTO: 9.6 FL (ref 8.1–13.5)
RBC # BLD AUTO: 3.61 M/UL (ref 3.95–5.11)
WBC OTHER # BLD: 9.2 K/UL (ref 3.5–11.3)

## 2023-11-07 PROCEDURE — 3074F SYST BP LT 130 MM HG: CPT | Performed by: INTERNAL MEDICINE

## 2023-11-07 PROCEDURE — 83036 HEMOGLOBIN GLYCOSYLATED A1C: CPT | Performed by: INTERNAL MEDICINE

## 2023-11-07 PROCEDURE — 99214 OFFICE O/P EST MOD 30 MIN: CPT | Performed by: INTERNAL MEDICINE

## 2023-11-07 PROCEDURE — 3078F DIAST BP <80 MM HG: CPT | Performed by: INTERNAL MEDICINE

## 2023-11-07 PROCEDURE — 99213 OFFICE O/P EST LOW 20 MIN: CPT | Performed by: INTERNAL MEDICINE

## 2023-11-07 PROCEDURE — 90686 IIV4 VACC NO PRSV 0.5 ML IM: CPT | Performed by: INTERNAL MEDICINE

## 2023-11-07 ASSESSMENT — PATIENT HEALTH QUESTIONNAIRE - PHQ9
SUM OF ALL RESPONSES TO PHQ QUESTIONS 1-9: 0
2. FEELING DOWN, DEPRESSED OR HOPELESS: 0
1. LITTLE INTEREST OR PLEASURE IN DOING THINGS: 0
SUM OF ALL RESPONSES TO PHQ QUESTIONS 1-9: 0
SUM OF ALL RESPONSES TO PHQ QUESTIONS 1-9: 0
SUM OF ALL RESPONSES TO PHQ9 QUESTIONS 1 & 2: 0
SUM OF ALL RESPONSES TO PHQ QUESTIONS 1-9: 0

## 2023-11-07 ASSESSMENT — ENCOUNTER SYMPTOMS
RESPIRATORY NEGATIVE: 1
EYES NEGATIVE: 1
ALLERGIC/IMMUNOLOGIC NEGATIVE: 1
GASTROINTESTINAL NEGATIVE: 1

## 2023-11-07 NOTE — PROGRESS NOTES
Vaccine Information Sheet, \"Influenza - Inactivated\"  given to Norma Beverly, or parent/legal guardian of  Norma Beverly and verbalized understanding. Patient responses:    Have you ever had a reaction to a flu vaccine? No  Do you have any current illness? No  Have you ever had [place] Syndrome? No  Do you have a serious allergy to any of the follow: Neomycin, Polymyxin, Thimerosal, eggs or egg products? No    Flu vaccine given per order. Please see immunization tab. Risks and benefits explained. Current VIS given.

## 2023-11-07 NOTE — PROGRESS NOTES
[facility]   Progress Note      Date of patient's visit: 11/7/2023  Patient's Name:  Dary Moeller                   YOB: 1965        PCP:  Charlotte Jones MD    Dary Moeller is a 63 y.o. female who presents for   Chief Complaint   Patient presents with    Diabetes    Hypertension    and follow up of chronic medical problems. Patient Active Problem List   Diagnosis    Type 2 diabetes mellitus, with long-term current use of insulin ([address])    Essential hypertension    GERD (gastroesophageal reflux disease)    Claudication of lower extremity (HCC)    Coronary artery disease involving native heart without angina pectoris    Diabetic polyneuropathy associated with type 2 diabetes mellitus (HCC)    Neuropathy    Ischemic ulcer of toe of right foot, limited to breakdown of skin (HCC)    Anemia    Penetrating foot wound, right, subsequent encounter    Dyslipidemia       HISTORY OF PRESENT ILLNESS:    History was obtained from the patient. She is a 63year old female with PMH of DM type II, HTN, CAD, PVD presented to the clinic for follow up and need for wheelchair. durable medical equipment  Dary Moeller was evaluated today and a DME order was entered for a standard wheelchair because she requires this to successfully complete daily living tasks of toileting, personal cares, ambulating, and meal preparation. A standard manual wheelchair is necessary due to patient's impaired ambulation and mobility restrictions and would be unable to resolve these daily living tasks using a cane or walker. The patient is capable of using a standard wheelchair safely in their home and can maneuver within their home with adequate access. There is a caregiver available to provide necessary assistance. The need for this equipment was discussed with the patient and she understands, is in agreement, and has not expressed an unwillingness to use the wheelchair.        Type 2 diabetes

## 2023-11-08 ENCOUNTER — TELEPHONE (OUTPATIENT)
Dept: SURGERY | Age: 58
End: 2023-11-08

## 2023-11-08 DIAGNOSIS — E87.5 HYPERKALEMIA: Primary | ICD-10-CM

## 2023-11-08 DIAGNOSIS — E78.5 DYSLIPIDEMIA: ICD-10-CM

## 2023-11-08 LAB
ALBUMIN SERPL-MCNC: 4 G/DL (ref 3.5–5.2)
ALBUMIN/GLOB SERPL: 1.4 {RATIO} (ref 1–2.5)
ALP SERPL-CCNC: 82 U/L (ref 35–104)
ALT SERPL-CCNC: 8 U/L (ref 5–33)
ANION GAP SERPL CALCULATED.3IONS-SCNC: 12 MMOL/L (ref 9–17)
AST SERPL-CCNC: 7 U/L
BILIRUB SERPL-MCNC: 0.2 MG/DL (ref 0.3–1.2)
BUN SERPL-MCNC: 21 MG/DL (ref 6–20)
CALCIUM SERPL-MCNC: 9.3 MG/DL (ref 8.6–10.4)
CHLORIDE SERPL-SCNC: 103 MMOL/L (ref 98–107)
CHOLEST SERPL-MCNC: 149 MG/DL
CHOLESTEROL/HDL RATIO: 4.7
CO2 SERPL-SCNC: 21 MMOL/L (ref 20–31)
CREAT SERPL-MCNC: 1.2 MG/DL (ref 0.5–0.9)
CREAT UR-MCNC: 64.6 MG/DL (ref 28–217)
GFR SERPL CREATININE-BSD FRML MDRD: 53 ML/MIN/1.73M2
GLUCOSE SERPL-MCNC: 110 MG/DL (ref 70–99)
HDLC SERPL-MCNC: 32 MG/DL
LDLC SERPL CALC-MCNC: 71 MG/DL (ref 0–130)
MICROALBUMIN UR-MCNC: 60 MG/L
MICROALBUMIN/CREAT UR-RTO: 93 MCG/MG CREAT
POTASSIUM SERPL-SCNC: 5.6 MMOL/L (ref 3.7–5.3)
PROT SERPL-MCNC: 6.9 G/DL (ref 6.4–8.3)
SODIUM SERPL-SCNC: 136 MMOL/L (ref 135–144)
TRIGL SERPL-MCNC: 231 MG/DL

## 2023-11-08 RX ORDER — FENOFIBRATE 48 MG/1
48 TABLET, COATED ORAL DAILY
Qty: 30 TABLET | Refills: 3 | Status: SHIPPED | OUTPATIENT
Start: 2023-11-08 | End: 2024-01-09

## 2023-12-10 DIAGNOSIS — E11.65 TYPE 2 DIABETES MELLITUS WITH HYPERGLYCEMIA, WITH LONG-TERM CURRENT USE OF INSULIN (HCC): ICD-10-CM

## 2023-12-10 DIAGNOSIS — Z79.4 TYPE 2 DIABETES MELLITUS WITH HYPERGLYCEMIA, WITH LONG-TERM CURRENT USE OF INSULIN (HCC): ICD-10-CM

## 2023-12-11 RX ORDER — INSULIN GLARGINE 100 [IU]/ML
30 INJECTION, SOLUTION SUBCUTANEOUS NIGHTLY
Qty: 30 ML | Refills: 3 | Status: SHIPPED | OUTPATIENT
Start: 2023-12-11

## 2023-12-11 NOTE — TELEPHONE ENCOUNTER
Last visit: 11/7/23  Last Med refill: 11/1/22  Does patient have enough medication for 72 hours: No:     Next Visit Date: 12/26/23  Future Appointments   Date Time Provider [address]   12/26/2023  1:00 PM Ruthellen Libman, MD [facility]  [address] Maintenance   Topic Date Due    Hepatitis B vaccine (1 of 3 - 3-dose series) Never done    Cervical cancer screen  Never done    Colorectal Cancer Screen  Never done    Diabetic retinal exam  12/02/2016    Breast cancer screen  12/02/2017    Diabetic foot exam  07/30/2020    COVID-19 Vaccine (3 - 2023-24 season) 09/01/2023    Shingles vaccine (1 of 2) 11/06/2024 (Originally 1/1/2016)    A1C test (Diabetic or Prediabetic)  11/07/2024    Diabetic Alb to Cr ratio (uACR) test  11/07/2024    Lipids  11/07/2024    Depression Screen  11/07/2024    GFR test (Diabetes, CKD 3-4, OR last GFR 15-59)  11/07/2024    DTaP/Tdap/Td vaccine (2 - Td or Tdap) 08/13/2025    Pneumococcal 0-64 years Vaccine (3 - PPSV23 or PCV20) 01/01/2031    Flu vaccine  Completed    Hepatitis C screen  Completed    HIV screen  Completed    Hepatitis A vaccine  Aged Out    Hib vaccine  Aged Out    Meningococcal (ACWY) vaccine  Aged Out       Hemoglobin A1C (%)   Date Value   11/07/2023 7.4   11/12/2021 10.6 (H)   11/25/2019 9.3 (H)             ( goal A1C is < 7)   No components found for: \"LABMICR\"  LDL Cholesterol (mg/dL)   Date Value   11/07/2023 71   05/12/2021 138 (H)       (goal LDL is <100)   AST (U/L)   Date Value   11/07/2023 7     ALT (U/L)   Date Value   11/07/2023 8     BUN (mg/dL)   Date Value   11/07/2023 21 (H)     BP Readings from Last 3 Encounters:   11/07/23 (!) 170/68   11/16/21 (!) 175/77   01/07/20 94/60          (goal 120/80)    All Future Testing planned in [facility]  Lab Frequency Next Occurrence   [place] DIGITAL SCREEN W OR WO CAD BILATERAL Once 02/15/2024   Potassium Once 11/08/2023   Hemoglobin and Hematocrit, Blood, Post Transfusion POST TRANSFUSION                Patient

## 2024-01-09 DIAGNOSIS — E78.5 DYSLIPIDEMIA: ICD-10-CM

## 2024-01-09 RX ORDER — FENOFIBRATE 48 MG/1
48 TABLET, COATED ORAL DAILY
Qty: 90 TABLET | Refills: 3 | Status: SHIPPED | OUTPATIENT
Start: 2024-01-09

## 2024-01-09 NOTE — TELEPHONE ENCOUNTER
A Refill Has Been Requested for Maribeth Hall    Medication Requested  Requested Prescriptions     Pending Prescriptions Disp Refills    fenofibrate (TRICOR) 48 MG tablet [Pharmacy Med Name: FENOFIBRATE  48MG  TAB] 90 tablet 3     Sig: TAKE 1 TABLET BY MOUTH DAILY       Last Visit Date (If Applicable)  11/7/2023    Next Visit Date (If Applicable)  Visit date not found

## 2024-03-30 DIAGNOSIS — I25.10 CORONARY ARTERY DISEASE INVOLVING NATIVE HEART WITHOUT ANGINA PECTORIS, UNSPECIFIED VESSEL OR LESION TYPE: ICD-10-CM

## 2024-04-01 NOTE — TELEPHONE ENCOUNTER
[facility] refill request from [facility] for Clopidogrel 75 mg.      Last visit: 11/7/23  Last Med refill: 10/14/23  Does patient have enough medication for 72 hours: No:     Next Visit Date:  No future appointments.    Health Maintenance   Topic Date Due    Hepatitis B vaccine (1 of 3 - 3-dose series) Never done    Cervical cancer screen  Never done    Colorectal Cancer Screen  Never done    Diabetic retinal exam  12/02/2016    Breast cancer screen  12/02/2017    Diabetic foot exam  07/30/2020    COVID-19 Vaccine (3 - 2023-24 season) 09/01/2023    Shingles vaccine (1 of 2) 11/06/2024 (Originally 1/1/2016)    A1C test (Diabetic or Prediabetic)  11/07/2024    Diabetic Alb to Cr ratio (uACR) test  11/07/2024    Lipids  11/07/2024    Depression Screen  11/07/2024    GFR test (Diabetes, CKD 3-4, OR last GFR 15-59)  11/07/2024    DTaP/Tdap/Td vaccine (2 - Td or Tdap) 08/13/2025    Pneumococcal 0-64 years Vaccine (3 of 3 - PPSV23 or PCV20) 01/01/2031    Flu vaccine  Completed    Hepatitis C screen  Completed    HIV screen  Completed    Hepatitis A vaccine  Aged Out    Hib vaccine  Aged Out    Polio vaccine  Aged Out    Meningococcal (ACWY) vaccine  Aged Out       Hemoglobin A1C (%)   Date Value   11/07/2023 7.4   11/12/2021 10.6 (H)   11/25/2019 9.3 (H)             ( goal A1C is < 7)   No components found for: \"LABMICR\"  LDL Cholesterol (mg/dL)   Date Value   11/07/2023 71   05/12/2021 138 (H)       (goal LDL is <100)   AST (U/L)   Date Value   11/07/2023 7     ALT (U/L)   Date Value   11/07/2023 8     BUN (mg/dL)   Date Value   11/07/2023 21 (H)     BP Readings from Last 3 Encounters:   11/07/23 (!) 170/68   11/16/21 (!) 175/77   01/07/20 94/60          (goal 120/80)    All Future Testing planned in [facility]  Lab Frequency Next Occurrence   [facility] DIGITAL SCREEN W OR WO CAD BILATERAL Once 02/15/2024   Potassium Once 11/08/2023   Hemoglobin and Hematocrit, Blood, Post Transfusion POST TRANSFUSION                Patient Active

## 2024-04-02 RX ORDER — CLOPIDOGREL BISULFATE 75 MG/1
TABLET ORAL
Qty: 90 TABLET | Refills: 3 | Status: SHIPPED | OUTPATIENT
Start: 2024-04-02

## 2024-06-21 NOTE — TELEPHONE ENCOUNTER
..Request for   Requested Prescriptions     Pending Prescriptions Disp Refills    metFORMIN (GLUCOPHAGE) 1000 MG tablet [Pharmacy Med Name: metFORMIN HCl 1000 MG Oral Tablet] 180 tablet 3     Sig: TAKE 1 TABLET TWICE A DAY WITH  MEALS    .      Please review and e-scribe to pharmacy listed in chart if appropriate. Thank you.      Last Visit Date: 11/7/2023  Next Visit Date: Visit date not found    No future appointments.    Health Maintenance   Topic Date Due    Hepatitis B vaccine (1 of 3 - 3-dose series) Never done    Cervical cancer screen  Never done    Colorectal Cancer Screen  Never done    Diabetic retinal exam  12/02/2016    Breast cancer screen  12/02/2017    Diabetic foot exam  07/30/2020    COVID-19 Vaccine (3 - 2023-24 season) 09/01/2023    Shingles vaccine (1 of 2) 11/06/2024 (Originally 1/1/2016)    A1C test (Diabetic or Prediabetic)  11/07/2024    Diabetic Alb to Cr ratio (uACR) test  11/07/2024    Lipids  11/07/2024    Depression Screen  11/07/2024    GFR test (Diabetes, CKD 3-4, OR last GFR 15-59)  11/07/2024    DTaP/Tdap/Td vaccine (2 - Td or Tdap) 08/13/2025    Pneumococcal 0-64 years Vaccine (3 of 3 - PPSV23 or PCV20) 01/01/2031    Flu vaccine  Completed    Hepatitis C screen  Completed    HIV screen  Completed    Hepatitis A vaccine  Aged Out    Hib vaccine  Aged Out    Polio vaccine  Aged Out    Meningococcal (ACWY) vaccine  Aged Out       Hemoglobin A1C (%)   Date Value   11/07/2023 7.4   11/12/2021 10.6 (H)   11/25/2019 9.3 (H)             ( goal A1C is < 7)   No components found for: \"LABMICR\"  No components found for: \"LDLCHOLESTEROL\", \"LDLCALC\"    (goal LDL is <100)   AST (U/L)   Date Value   11/07/2023 7     ALT (U/L)   Date Value   11/07/2023 8     BUN (mg/dL)   Date Value   11/07/2023 21 (H)     BP Readings from Last 3 Encounters:   11/07/23 (!) 170/68   11/16/21 (!) 175/77   01/07/20 94/60          (goal 120/80)    All Future Testing planned in [facility]  Lab Frequency Next Occurrence

## 2025-01-08 DIAGNOSIS — E11.65 TYPE 2 DIABETES MELLITUS WITH HYPERGLYCEMIA, WITH LONG-TERM CURRENT USE OF INSULIN (HCC): ICD-10-CM

## 2025-01-08 DIAGNOSIS — Z79.4 TYPE 2 DIABETES MELLITUS WITH HYPERGLYCEMIA, WITH LONG-TERM CURRENT USE OF INSULIN (HCC): ICD-10-CM

## 2025-01-08 RX ORDER — INSULIN GLARGINE 100 [IU]/ML
30 INJECTION, SOLUTION SUBCUTANEOUS NIGHTLY
Qty: 30 ML | Refills: 3 | Status: SHIPPED | OUTPATIENT
Start: 2025-01-08

## 2025-01-08 NOTE — TELEPHONE ENCOUNTER
Request for   Requested Prescriptions     Pending Prescriptions Disp Refills    LANTUS SOLOSTAR 100 UNIT/ML injection pen 30 mL 3     Sig: Inject 30 Units into the skin nightly    .      Please review and e-scribe to pharmacy listed in chart if appropriate. Thank you.      Last Visit Date: 11/7/2023  Next Visit Date: 2/26/2025    Future Appointments   Date Time Provider Department Center   2/26/2025  1:20 PM Savita Delong MD [facility] DEP       Health Maintenance   Topic Date Due    Hepatitis B vaccine (1 of 3 - 19+ 3-dose series) Never done    Cervical cancer screen  Never done    Colorectal Cancer Screen  Never done    Shingles vaccine (1 of 2) Never done    Diabetic retinal exam  12/02/2016    Breast cancer screen  12/02/2017    Diabetic foot exam  07/30/2020    Flu vaccine (1) 08/01/2024    COVID-19 Vaccine (3 - 2023-24 season) 09/01/2024    A1C test (Diabetic or Prediabetic)  11/07/2024    Diabetic Alb to Cr ratio (uACR) test  11/07/2024    Lipids  11/07/2024    Depression Screen  11/07/2024    GFR test (Diabetes, CKD 3-4, OR last GFR 15-59)  11/07/2024    DTaP/Tdap/Td vaccine (2 - Td or Tdap) 08/13/2025    Pneumococcal 0-64 years Vaccine (3 of 3 - PPSV23 or PCV20) 01/01/2031    Hepatitis C screen  Completed    HIV screen  Completed    Hepatitis A vaccine  Aged Out    Hib vaccine  Aged Out    Polio vaccine  Aged Out    Meningococcal (ACWY) vaccine  Aged Out       Hemoglobin A1C (%)   Date Value   11/07/2023 7.4   11/12/2021 10.6 (H)   11/25/2019 9.3 (H)             ( goal A1C is < 7)   No components found for: \"LABMICR\"  No components found for: \"LDLCHOLESTEROL\", \"LDLCALC\"    (goal LDL is <100)   AST (U/L)   Date Value   11/07/2023 7     ALT (U/L)   Date Value   11/07/2023 8     BUN (mg/dL)   Date Value   11/07/2023 21 (H)     BP Readings from Last 3 Encounters:   11/07/23 (!) 170/68   11/16/21 (!) 175/77   01/07/20 94/60          (goal 120/80)    All Future Testing planned in [facility]  Lab Frequency

## 2025-01-10 DIAGNOSIS — E78.5 DYSLIPIDEMIA: ICD-10-CM

## 2025-01-10 RX ORDER — FENOFIBRATE 48 MG/1
48 TABLET, COATED ORAL DAILY
Qty: 90 TABLET | Refills: 0 | Status: SHIPPED | OUTPATIENT
Start: 2025-01-10

## 2025-01-10 NOTE — TELEPHONE ENCOUNTER
..Request for   Requested Prescriptions     Pending Prescriptions Disp Refills    fenofibrate (TRICOR) 48 MG tablet [Pharmacy Med Name: FENOFIBRATE  48MG  TAB] 90 tablet 3     Sig: TAKE 1 TABLET BY MOUTH DAILY    .      Please review and e-scribe to pharmacy listed in chart if appropriate. Thank you.      Last Visit Date: 11/7/2023  Next Visit Date: 2/26/2025    Future Appointments   Date Time Provider Department Center   2/26/2025  1:20 PM Savita Delong MD [facility] DEP       Health Maintenance   Topic Date Due    Hepatitis B vaccine (1 of 3 - 19+ 3-dose series) Never done    Cervical cancer screen  Never done    Colorectal Cancer Screen  Never done    Shingles vaccine (1 of 2) Never done    Diabetic retinal exam  12/02/2016    Breast cancer screen  12/02/2017    Diabetic foot exam  07/30/2020    Flu vaccine (1) 08/01/2024    COVID-19 Vaccine (3 - 2023-24 season) 09/01/2024    A1C test (Diabetic or Prediabetic)  11/07/2024    Diabetic Alb to Cr ratio (uACR) test  11/07/2024    Lipids  11/07/2024    Depression Screen  11/07/2024    GFR test (Diabetes, CKD 3-4, OR last GFR 15-59)  11/07/2024    DTaP/Tdap/Td vaccine (2 - Td or Tdap) 08/13/2025    Pneumococcal 0-64 years Vaccine (3 of 3 - PPSV23 or PCV20) 01/01/2031    Hepatitis C screen  Completed    HIV screen  Completed    Hepatitis A vaccine  Aged Out    Hib vaccine  Aged Out    Polio vaccine  Aged Out    Meningococcal (ACWY) vaccine  Aged Out       Hemoglobin A1C (%)   Date Value   11/07/2023 7.4   11/12/2021 10.6 (H)   11/25/2019 9.3 (H)             ( goal A1C is < 7)   No components found for: \"LABMICR\"  No components found for: \"LDLCHOLESTEROL\", \"LDLCALC\"    (goal LDL is <100)   AST (U/L)   Date Value   11/07/2023 7     ALT (U/L)   Date Value   11/07/2023 8     BUN (mg/dL)   Date Value   11/07/2023 21 (H)     BP Readings from Last 3 Encounters:   11/07/23 (!) 170/68   11/16/21 (!) 175/77   01/07/20 94/60          (goal 120/80)    All Future Testing

## 2025-03-06 DIAGNOSIS — E78.5 DYSLIPIDEMIA: ICD-10-CM

## 2025-03-07 NOTE — TELEPHONE ENCOUNTER
Writer changed script from 90 days to 30 days with no refills.  Patient has not been seen since 11/7/23.  Patient has appt on 3/12/25.      Last visit: 11/7/23  Last Med refill: 1/10/25  Does patient have enough medication for 72 hours: No:     Next Visit Date: 3/12/25  Future Appointments   Date Time Provider Department Center   3/12/2025  1:20 PM Savita Delong MD [facility] DEP       Health Maintenance   Topic Date Due    Hepatitis B vaccine (1 of 3 - 19+ 3-dose series) Never done    Cervical cancer screen  Never done    Colorectal Cancer Screen  Never done    Shingles vaccine (1 of 2) Never done    Diabetic retinal exam  12/02/2016    Breast cancer screen  12/02/2017    Diabetic foot exam  07/30/2020    Pneumococcal 50+ years Vaccine (3 of 3 - PCV20 or PCV21) 12/14/2022    Flu vaccine (1) 08/01/2024    COVID-19 Vaccine (3 - 2024-25 season) 09/01/2024    A1C test (Diabetic or Prediabetic)  11/07/2024    Diabetic Alb to Cr ratio (uACR) test  11/07/2024    Lipids  11/07/2024    Depression Screen  11/07/2024    GFR test (Diabetes, CKD 3-4, OR last GFR 15-59)  11/07/2024    DTaP/Tdap/Td vaccine (2 - Td or Tdap) 08/13/2025    Hepatitis C screen  Completed    HIV screen  Completed    Hepatitis A vaccine  Aged Out    Hib vaccine  Aged Out    Polio vaccine  Aged Out    Meningococcal (ACWY) vaccine  Aged Out    Pneumococcal 0-49 years Vaccine  Discontinued       Hemoglobin A1C (%)   Date Value   11/07/2023 7.4   11/12/2021 10.6 (H)   11/25/2019 9.3 (H)             ( goal A1C is < 7)   No components found for: \"LABMICR\"  No components found for: \"LDLCHOLESTEROL\", \"LDLCALC\"    (goal LDL is <100)   AST (U/L)   Date Value   11/07/2023 7     ALT (U/L)   Date Value   11/07/2023 8     BUN (mg/dL)   Date Value   11/07/2023 21 (H)     BP Readings from Last 3 Encounters:   11/07/23 (!) 170/68   11/16/21 (!) 175/77   01/07/20 94/60          (goal 120/80)    All Future Testing planned in [facility]  Lab Frequency Next Occurrence

## 2025-03-11 RX ORDER — FENOFIBRATE 48 MG/1
48 TABLET, COATED ORAL DAILY
Qty: 30 TABLET | Refills: 0 | Status: SHIPPED | OUTPATIENT
Start: 2025-03-11 | End: 2025-03-12 | Stop reason: SDUPTHER

## 2025-03-12 ENCOUNTER — HOSPITAL ENCOUNTER (OUTPATIENT)
Age: 60
Setting detail: SPECIMEN
Discharge: HOME | End: 2025-03-12

## 2025-03-12 ENCOUNTER — OFFICE VISIT (OUTPATIENT)
Dept: INTERNAL MEDICINE | Age: 60
End: 2025-03-12
Payer: COMMERCIAL

## 2025-03-12 VITALS
DIASTOLIC BLOOD PRESSURE: 88 MMHG | SYSTOLIC BLOOD PRESSURE: 220 MMHG | BODY MASS INDEX: 24.07 KG/M2 | HEART RATE: 90 BPM | TEMPERATURE: 97.9 F | OXYGEN SATURATION: 99 % | HEIGHT: 69 IN

## 2025-03-12 DIAGNOSIS — E11.65 TYPE 2 DIABETES MELLITUS WITH HYPERGLYCEMIA, WITH LONG-TERM CURRENT USE OF INSULIN (HCC): Primary | ICD-10-CM

## 2025-03-12 DIAGNOSIS — I25.10 CORONARY ARTERY DISEASE INVOLVING NATIVE HEART WITHOUT ANGINA PECTORIS, UNSPECIFIED VESSEL OR LESION TYPE: ICD-10-CM

## 2025-03-12 DIAGNOSIS — I10 ESSENTIAL HYPERTENSION: ICD-10-CM

## 2025-03-12 DIAGNOSIS — Z79.4 TYPE 2 DIABETES MELLITUS WITH HYPERGLYCEMIA, WITH LONG-TERM CURRENT USE OF INSULIN (HCC): ICD-10-CM

## 2025-03-12 DIAGNOSIS — S88.911A AMPUTATED RIGHT LEG (HCC): ICD-10-CM

## 2025-03-12 DIAGNOSIS — E78.5 DYSLIPIDEMIA: ICD-10-CM

## 2025-03-12 DIAGNOSIS — Z12.31 ENCOUNTER FOR SCREENING MAMMOGRAM FOR MALIGNANT NEOPLASM OF BREAST: ICD-10-CM

## 2025-03-12 DIAGNOSIS — E11.65 TYPE 2 DIABETES MELLITUS WITH HYPERGLYCEMIA, WITH LONG-TERM CURRENT USE OF INSULIN (HCC): ICD-10-CM

## 2025-03-12 DIAGNOSIS — Z79.4 TYPE 2 DIABETES MELLITUS WITH HYPERGLYCEMIA, WITH LONG-TERM CURRENT USE OF INSULIN (HCC): Primary | ICD-10-CM

## 2025-03-12 LAB
ALBUMIN SERPL-MCNC: 4.2 G/DL (ref 3.5–5.2)
ALBUMIN/GLOB SERPL: 1.2 {RATIO} (ref 1–2.5)
ALP SERPL-CCNC: 97 U/L (ref 35–104)
ALT SERPL-CCNC: 12 U/L (ref 10–35)
ANION GAP SERPL CALCULATED.3IONS-SCNC: 10 MMOL/L (ref 9–16)
AST SERPL-CCNC: 9 U/L (ref 10–35)
BILIRUB SERPL-MCNC: 0.2 MG/DL (ref 0–1.2)
BUN SERPL-MCNC: 14 MG/DL (ref 6–20)
CALCIUM SERPL-MCNC: 9.9 MG/DL (ref 8.6–10.4)
CHLORIDE SERPL-SCNC: 101 MMOL/L (ref 98–107)
CHOLEST SERPL-MCNC: 235 MG/DL (ref 0–199)
CHOLESTEROL/HDL RATIO: 6.4
CO2 SERPL-SCNC: 26 MMOL/L (ref 20–31)
CREAT SERPL-MCNC: 1.1 MG/DL (ref 0.6–0.9)
GFR, ESTIMATED: 58 ML/MIN/1.73M2
GLUCOSE SERPL-MCNC: 207 MG/DL (ref 74–99)
HBA1C MFR BLD: 9.8 %
HDLC SERPL-MCNC: 37 MG/DL
LDLC SERPL CALC-MCNC: 120 MG/DL (ref 0–100)
POTASSIUM SERPL-SCNC: 4.8 MMOL/L (ref 3.7–5.3)
PROT SERPL-MCNC: 7.6 G/DL (ref 6.6–8.7)
SODIUM SERPL-SCNC: 137 MMOL/L (ref 136–145)
TRIGL SERPL-MCNC: 389 MG/DL
VLDLC SERPL CALC-MCNC: 78 MG/DL (ref 1–30)

## 2025-03-12 PROCEDURE — 83036 HEMOGLOBIN GLYCOSYLATED A1C: CPT | Performed by: INTERNAL MEDICINE

## 2025-03-12 PROCEDURE — 99213 OFFICE O/P EST LOW 20 MIN: CPT | Performed by: INTERNAL MEDICINE

## 2025-03-12 PROCEDURE — 99214 OFFICE O/P EST MOD 30 MIN: CPT | Performed by: INTERNAL MEDICINE

## 2025-03-12 PROCEDURE — 3077F SYST BP >= 140 MM HG: CPT | Performed by: INTERNAL MEDICINE

## 2025-03-12 PROCEDURE — 3079F DIAST BP 80-89 MM HG: CPT | Performed by: INTERNAL MEDICINE

## 2025-03-12 PROCEDURE — 3046F HEMOGLOBIN A1C LEVEL >9.0%: CPT | Performed by: INTERNAL MEDICINE

## 2025-03-12 RX ORDER — LISINOPRIL AND HYDROCHLOROTHIAZIDE 12.5; 2 MG/1; MG/1
1 TABLET ORAL DAILY
Qty: 30 TABLET | Refills: 0 | Status: SHIPPED | OUTPATIENT
Start: 2025-03-12

## 2025-03-12 RX ORDER — CLOPIDOGREL BISULFATE 75 MG/1
TABLET ORAL
Qty: 30 TABLET | Refills: 0 | Status: SHIPPED | OUTPATIENT
Start: 2025-03-12

## 2025-03-12 RX ORDER — PEN NEEDLE, DIABETIC 31 G X1/4"
NEEDLE, DISPOSABLE MISCELLANEOUS
Qty: 100 EACH | Refills: 3 | Status: SHIPPED | OUTPATIENT
Start: 2025-03-12

## 2025-03-12 RX ORDER — TRAMADOL HYDROCHLORIDE 50 MG/1
50 TABLET ORAL EVERY 6 HOURS PRN
Qty: 12 TABLET | Refills: 0 | Status: SHIPPED | OUTPATIENT
Start: 2025-03-12 | End: 2025-03-15

## 2025-03-12 RX ORDER — FENOFIBRATE 48 MG/1
48 TABLET, COATED ORAL DAILY
Qty: 30 TABLET | Refills: 0 | Status: SHIPPED | OUTPATIENT
Start: 2025-03-12

## 2025-03-12 RX ORDER — LISINOPRIL AND HYDROCHLOROTHIAZIDE 12.5; 2 MG/1; MG/1
1 TABLET ORAL DAILY
Qty: 30 TABLET | Refills: 0 | Status: SHIPPED | OUTPATIENT
Start: 2025-03-12 | End: 2025-03-12 | Stop reason: SDUPTHER

## 2025-03-12 SDOH — ECONOMIC STABILITY: FOOD INSECURITY: WITHIN THE PAST 12 MONTHS, THE FOOD YOU BOUGHT JUST DIDN'T LAST AND YOU DIDN'T HAVE MONEY TO GET MORE.: NEVER TRUE

## 2025-03-12 SDOH — ECONOMIC STABILITY: FOOD INSECURITY: WITHIN THE PAST 12 MONTHS, YOU WORRIED THAT YOUR FOOD WOULD RUN OUT BEFORE YOU GOT MONEY TO BUY MORE.: NEVER TRUE

## 2025-03-12 ASSESSMENT — PATIENT HEALTH QUESTIONNAIRE - PHQ9
SUM OF ALL RESPONSES TO PHQ QUESTIONS 1-9: 0
2. FEELING DOWN, DEPRESSED OR HOPELESS: NOT AT ALL
1. LITTLE INTEREST OR PLEASURE IN DOING THINGS: NOT AT ALL
SUM OF ALL RESPONSES TO PHQ QUESTIONS 1-9: 0

## 2025-03-12 ASSESSMENT — ENCOUNTER SYMPTOMS
EYES NEGATIVE: 1
ALLERGIC/IMMUNOLOGIC NEGATIVE: 1
RESPIRATORY NEGATIVE: 1
GASTROINTESTINAL NEGATIVE: 1
BACK PAIN: 1

## 2025-03-12 NOTE — PROGRESS NOTES
Urine     Standing Status:   Future     Expected Date:   3/12/2025     Expiration Date:   3/12/2026    Lipid Panel     Standing Status:   Future     Expected Date:   3/12/2025     Expiration Date:   3/12/2026    Comprehensive Metabolic Panel     Standing Status:   Future     Expected Date:   3/12/2025     Expiration Date:   3/12/2026    External Referral To Home Health     Referral Priority:   Routine     Referral Type:   Home Health Care     Referral Reason:   Specialty Services Required     Requested Specialty:   Home Health Services     Number of Visits Requested:   1    POCT glycosylated hemoglobin (Hb A1C)       No follow-ups on file.    Patient voiced understanding and agreed to treatment plan.     This note is created with the assistance of a speech-recognition program. While intending to generate a document that actually reflects the content of the visit, the document can still have some mistakes which may not have been identified and corrected by editing.      Dr Savita Delong MD, FACP  Associate , Department of Internal Medicine  Resident Ambulatory Site Medical Director  [facility]  [facility]  Internal Medicine Clerkship - TIP                   3/12/2025, 2:17 PM

## 2025-03-13 LAB
CREAT UR-MCNC: 30.7 MG/DL (ref 28–217)
MICROALBUMIN UR-MCNC: 522 MG/L (ref 0–20)
MICROALBUMIN/CREAT UR-RTO: 1700 MCG/MG CREAT (ref 0–25)

## 2025-03-19 ENCOUNTER — OFFICE VISIT (OUTPATIENT)
Dept: INTERNAL MEDICINE | Age: 60
End: 2025-03-19
Payer: COMMERCIAL

## 2025-03-19 VITALS
OXYGEN SATURATION: 98 % | TEMPERATURE: 97.9 F | DIASTOLIC BLOOD PRESSURE: 80 MMHG | HEART RATE: 92 BPM | SYSTOLIC BLOOD PRESSURE: 206 MMHG | BODY MASS INDEX: 24.07 KG/M2 | HEIGHT: 69 IN

## 2025-03-19 DIAGNOSIS — I10 ESSENTIAL HYPERTENSION: ICD-10-CM

## 2025-03-19 DIAGNOSIS — E11.65 TYPE 2 DIABETES MELLITUS WITH HYPERGLYCEMIA, WITH LONG-TERM CURRENT USE OF INSULIN (HCC): ICD-10-CM

## 2025-03-19 DIAGNOSIS — I73.9 CLAUDICATION OF LEFT LOWER EXTREMITY: ICD-10-CM

## 2025-03-19 DIAGNOSIS — I16.0 HYPERTENSIVE URGENCY: Primary | ICD-10-CM

## 2025-03-19 DIAGNOSIS — Z79.4 TYPE 2 DIABETES MELLITUS WITH HYPERGLYCEMIA, WITH LONG-TERM CURRENT USE OF INSULIN (HCC): ICD-10-CM

## 2025-03-19 DIAGNOSIS — E78.5 DYSLIPIDEMIA: ICD-10-CM

## 2025-03-19 PROCEDURE — 3077F SYST BP >= 140 MM HG: CPT | Performed by: INTERNAL MEDICINE

## 2025-03-19 PROCEDURE — 99214 OFFICE O/P EST MOD 30 MIN: CPT | Performed by: INTERNAL MEDICINE

## 2025-03-19 PROCEDURE — 3046F HEMOGLOBIN A1C LEVEL >9.0%: CPT | Performed by: INTERNAL MEDICINE

## 2025-03-19 PROCEDURE — 99215 OFFICE O/P EST HI 40 MIN: CPT | Performed by: INTERNAL MEDICINE

## 2025-03-19 PROCEDURE — 3079F DIAST BP 80-89 MM HG: CPT | Performed by: INTERNAL MEDICINE

## 2025-03-19 RX ORDER — CARVEDILOL 6.25 MG/1
6.25 TABLET ORAL 2 TIMES DAILY
Qty: 60 TABLET | Refills: 3 | Status: SHIPPED | OUTPATIENT
Start: 2025-03-19

## 2025-03-19 RX ORDER — HYDRALAZINE HYDROCHLORIDE 50 MG/1
25 TABLET, FILM COATED ORAL ONCE
Status: COMPLETED | OUTPATIENT
Start: 2025-03-19 | End: 2025-03-19

## 2025-03-19 RX ORDER — ATORVASTATIN CALCIUM 40 MG/1
40 TABLET, FILM COATED ORAL DAILY
Qty: 30 TABLET | Refills: 5 | Status: SHIPPED | OUTPATIENT
Start: 2025-03-19

## 2025-03-19 RX ORDER — INSULIN GLARGINE 100 [IU]/ML
40 INJECTION, SOLUTION SUBCUTANEOUS NIGHTLY
Qty: 30 ML | Refills: 3 | Status: SHIPPED | OUTPATIENT
Start: 2025-03-19

## 2025-03-19 RX ORDER — HYDRALAZINE HYDROCHLORIDE 50 MG/1
25 TABLET, FILM COATED ORAL EVERY 8 HOURS SCHEDULED
Status: DISCONTINUED | OUTPATIENT
Start: 2025-03-19 | End: 2025-03-19

## 2025-03-19 RX ADMIN — HYDRALAZINE HYDROCHLORIDE 25 MG: 50 TABLET, FILM COATED ORAL at 13:25

## 2025-03-19 ASSESSMENT — ENCOUNTER SYMPTOMS
EYES NEGATIVE: 1
GASTROINTESTINAL NEGATIVE: 1
RESPIRATORY NEGATIVE: 1
ALLERGIC/IMMUNOLOGIC NEGATIVE: 1

## 2025-03-19 NOTE — PROGRESS NOTES
[facility]   Progress Note        Date of patient's visit: 3/19/2025  Patient's Name:  Maribeth Hall                   YOB: 1965        PCP:  Savita Delong MD    Maribeth Hall is a 60 y.o. female who presents for   Chief Complaint   Patient presents with    Hypertension     1 week follow up.  Patient hasn't started her BP medications    and follow up of chronic medical problems.  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     Hypertension:  Home blood pressure monitoring: No.  She is not adherent to a low sodium diet. Patient denies chest pain, shortness of breath, and headache.  Antihypertensive medication side effects: no medication side effects noted.  She was seen in the clinic last week after a year and a half of missed and canceled appointments and no follow-up on lab work as ordered.  Her blood pressure continues to be elevated and we prescribed medication for her at last visit but she was unable to pick it up from the pharmacy as she is reporting that her insurance would not cover it until the beginning of the month.  Blood pressure remains elevated today in the clinic and patient is asymptomatic.  She is uncooperative and refuses to go to the emergency department or the hospital for any acute treatment despite knowing the risks.                                      Sodium (mmol/L)   Date Value   03/12/2025

## 2025-03-22 ENCOUNTER — RESULTS FOLLOW-UP (OUTPATIENT)
Dept: INTERNAL MEDICINE | Age: 60
End: 2025-03-22

## 2025-03-26 ENCOUNTER — TELEPHONE (OUTPATIENT)
Dept: INTERNAL MEDICINE | Age: 60
End: 2025-03-26

## 2025-03-26 NOTE — TELEPHONE ENCOUNTER
Phone call to patient to verify if she got her medications and that she is taking the Carvedilol 6.25 mg and the Lisinopril-Hydrochlorothiazide 20-12.5 mg and patient stated that she received both of them and that she is taking them.  Patient has an appt on 4/8/25

## 2025-03-26 NOTE — TELEPHONE ENCOUNTER
----- Message from Mireille HERNANDEZ sent at 3/20/2025  9:45 AM EDT -----  Regarding: Call patient  Call patient to see if she is taking her blood pressure medications.  Carvedilol and Lisinopril-HCTZ

## 2025-03-28 DIAGNOSIS — E78.5 DYSLIPIDEMIA: ICD-10-CM

## 2025-03-31 RX ORDER — FENOFIBRATE 48 MG/1
48 TABLET, COATED ORAL DAILY
Qty: 30 TABLET | Refills: 11 | Status: SHIPPED | OUTPATIENT
Start: 2025-03-31

## 2025-03-31 NOTE — TELEPHONE ENCOUNTER
Last visit: 3/19/25  Last Med refill: 3/12/25  Does patient have enough medication for 72 hours: No:     Next Visit Date: 4/8/25  Future Appointments   Date Time Provider Department Center   4/8/2025 12:40 AM Savita Delong MD [facility] DEP       Health Maintenance   Topic Date Due    Hepatitis B vaccine (1 of 3 - 19+ 3-dose series) Never done    Cervical cancer screen  Never done    Colorectal Cancer Screen  Never done    Diabetic retinal exam  12/02/2016    Breast cancer screen  12/02/2017    Diabetic foot exam  07/30/2020    Pneumococcal 50+ years Vaccine (3 of 3 - PCV20 or PCV21) 12/14/2022    Flu vaccine (1) 08/01/2024    COVID-19 Vaccine (3 - 2024-25 season) 09/01/2024    Shingles vaccine (1 of 2) 03/12/2026 (Originally 1/1/2016)    A1C test (Diabetic or Prediabetic)  06/12/2025    DTaP/Tdap/Td vaccine (2 - Td or Tdap) 08/13/2025    Diabetic Alb to Cr ratio (uACR) test  03/12/2026    Lipids  03/12/2026    Depression Screen  03/12/2026    GFR test (Diabetes, CKD 3-4, OR last GFR 15-59)  03/12/2026    Hepatitis C screen  Completed    HIV screen  Completed    Hepatitis A vaccine  Aged Out    Hib vaccine  Aged Out    Polio vaccine  Aged Out    Meningococcal (ACWY) vaccine  Aged Out    Meningococcal B vaccine  Aged Out    Pneumococcal 0-49 years Vaccine  Discontinued       Hemoglobin A1C (%)   Date Value   03/12/2025 9.8   11/07/2023 7.4   11/12/2021 10.6 (H)             ( goal A1C is < 7)   No components found for: \"LABMICR\"  No components found for: \"LDLCHOLESTEROL\", \"LDLCALC\"    (goal LDL is <100)   AST (U/L)   Date Value   03/12/2025 9 (L)     ALT (U/L)   Date Value   03/12/2025 12     BUN (mg/dL)   Date Value   03/12/2025 14     BP Readings from Last 3 Encounters:   03/19/25 (!) 206/80   03/12/25 (!) 220/88   11/07/23 (!) 170/68          (goal 120/80)    All Future Testing planned in [facility]  Lab Frequency Next Occurrence   [facility] DIGITAL SCREEN W OR WO CAD BILATERAL Once 03/12/2025   Hemoglobin and

## 2025-04-07 DIAGNOSIS — I25.10 CORONARY ARTERY DISEASE INVOLVING NATIVE HEART WITHOUT ANGINA PECTORIS, UNSPECIFIED VESSEL OR LESION TYPE: ICD-10-CM

## 2025-04-07 NOTE — TELEPHONE ENCOUNTER
Request for clopidogrel  Requested Prescriptions      No prescriptions requested or ordered in this encounter    .      Please review and e-scribe to pharmacy listed in chart if appropriate. Thank you.      Last Visit Date: 3/19/2025  Next Visit Date: Visit date not found    No future appointments.    Health Maintenance   Topic Date Due    Hepatitis B vaccine (1 of 3 - 19+ 3-dose series) Never done    Cervical cancer screen  Never done    Colorectal Cancer Screen  Never done    Diabetic retinal exam  12/02/2016    Breast cancer screen  12/02/2017    Diabetic foot exam  07/30/2020    Pneumococcal 50+ years Vaccine (3 of 3 - PCV20 or PCV21) 12/14/2022    COVID-19 Vaccine (3 - 2024-25 season) 09/01/2024    Shingles vaccine (1 of 2) 03/12/2026 (Originally 1/1/2016)    A1C test (Diabetic or Prediabetic)  06/12/2025    Flu vaccine (Season Ended) 08/01/2025    DTaP/Tdap/Td vaccine (2 - Td or Tdap) 08/13/2025    Diabetic Alb to Cr ratio (uACR) test  03/12/2026    Lipids  03/12/2026    Depression Screen  03/12/2026    GFR test (Diabetes, CKD 3-4, OR last GFR 15-59)  03/12/2026    Hepatitis C screen  Completed    HIV screen  Completed    Hepatitis A vaccine  Aged Out    Hib vaccine  Aged Out    Polio vaccine  Aged Out    Meningococcal (ACWY) vaccine  Aged Out    Meningococcal B vaccine  Aged Out    Pneumococcal 0-49 years Vaccine  Discontinued       Hemoglobin A1C (%)   Date Value   03/12/2025 9.8   11/07/2023 7.4   11/12/2021 10.6 (H)             ( goal A1C is < 7)   No components found for: \"LABMICR\"  No components found for: \"LDLCHOLESTEROL\", \"LDLCALC\"    (goal LDL is <100)   AST (U/L)   Date Value   03/12/2025 9 (L)     ALT (U/L)   Date Value   03/12/2025 12     BUN (mg/dL)   Date Value   03/12/2025 14     BP Readings from Last 3 Encounters:   03/19/25 (!) 206/80   03/12/25 (!) 220/88   11/07/23 (!) 170/68          (goal 120/80)    All Future Testing planned in [facility]  Lab Frequency Next Occurrence   [facility] DIGITAL SCREEN

## 2025-04-08 RX ORDER — CLOPIDOGREL BISULFATE 75 MG/1
TABLET ORAL
Qty: 30 TABLET | Refills: 0 | OUTPATIENT
Start: 2025-04-08

## 2025-04-08 NOTE — TELEPHONE ENCOUNTER
Patient cancelled appointment for today after lengthy discussion at last visit about her lack of adherence to appointments and follow ups and how that is dangerous to her health and well being. Last visit BP was very high. Refused ED and agreed to follow up in office but yet cancelled and did not reschedule.

## 2025-04-19 DIAGNOSIS — I25.10 CORONARY ARTERY DISEASE INVOLVING NATIVE HEART WITHOUT ANGINA PECTORIS, UNSPECIFIED VESSEL OR LESION TYPE: ICD-10-CM

## 2025-04-21 RX ORDER — CLOPIDOGREL BISULFATE 75 MG/1
75 TABLET ORAL DAILY
Qty: 30 TABLET | Refills: 11 | OUTPATIENT
Start: 2025-04-21

## 2025-05-19 DIAGNOSIS — I25.10 CORONARY ARTERY DISEASE INVOLVING NATIVE HEART WITHOUT ANGINA PECTORIS, UNSPECIFIED VESSEL OR LESION TYPE: ICD-10-CM

## 2025-05-19 NOTE — TELEPHONE ENCOUNTER
11/07/23 (!) 170/68          (goal 120/80)    All Future Testing planned in [facility]  Lab Frequency Next Occurrence   [facility] DIGITAL SCREEN W OR WO CAD BILATERAL Once 03/12/2025   Hemoglobin and Hematocrit, Blood, Post Transfusion POST TRANSFUSION          Patient Active Problem List:     Type 2 diabetes mellitus, with long-term current use of insulin (HCC)     Essential hypertension     GERD (gastroesophageal reflux disease)     Claudication of lower extremity     Coronary artery disease involving native heart without angina pectoris     Diabetic polyneuropathy associated with type 2 diabetes mellitus (HCC)     Neuropathy     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

## 2025-05-20 RX ORDER — CLOPIDOGREL BISULFATE 75 MG/1
75 TABLET ORAL DAILY
Qty: 30 TABLET | Refills: 11 | Status: SHIPPED | OUTPATIENT
Start: 2025-05-20

## 2025-06-24 DIAGNOSIS — I10 ESSENTIAL HYPERTENSION: ICD-10-CM

## 2025-06-24 RX ORDER — CARVEDILOL 6.25 MG/1
6.25 TABLET ORAL 2 TIMES DAILY
Qty: 60 TABLET | Refills: 3 | Status: SHIPPED | OUTPATIENT
Start: 2025-06-24

## 2025-06-24 NOTE — TELEPHONE ENCOUNTER
..Request for   Requested Prescriptions     Pending Prescriptions Disp Refills    carvedilol (COREG) 6.25 MG tablet [Pharmacy Med Name: CARVEDILOL 6.25MG TABLETS] 60 tablet 3     Sig: TAKE 1 TABLET BY MOUTH TWICE DAILY    .      Please review and e-scribe to pharmacy listed in chart if appropriate. Thank you.      Last Visit Date: Visit date not found  Next Visit Date: Visit date not found    No future appointments.    Health Maintenance   Topic Date Due    Hepatitis B vaccine (1 of 3 - 19+ 3-dose series) Never done    Cervical cancer screen  Never done    Colorectal Cancer Screen  Never done    Diabetic retinal exam  12/02/2016    Breast cancer screen  12/02/2017    Diabetic foot exam  07/30/2020    Pneumococcal 50+ years Vaccine (3 of 3 - PCV20 or PCV21) 12/14/2022    COVID-19 Vaccine (3 - 2024-25 season) 09/01/2024    A1C test (Diabetic or Prediabetic)  06/12/2025    Shingles vaccine (1 of 2) 03/12/2026 (Originally 1/1/2016)    Flu vaccine (Season Ended) 08/01/2025    DTaP/Tdap/Td vaccine (2 - Td or Tdap) 08/13/2025    Diabetic Alb to Cr ratio (uACR) test  03/12/2026    Lipids  03/12/2026    Depression Screen  03/12/2026    GFR test (Diabetes, CKD 3-4, OR last GFR 15-59)  03/12/2026    Hepatitis C screen  Completed    HIV screen  Completed    Hepatitis A vaccine  Aged Out    Hib vaccine  Aged Out    Polio vaccine  Aged Out    Meningococcal (ACWY) vaccine  Aged Out    Meningococcal B vaccine  Aged Out    Pneumococcal 0-49 years Vaccine  Discontinued       Hemoglobin A1C (%)   Date Value   03/12/2025 9.8   11/07/2023 7.4   11/12/2021 10.6 (H)             ( goal A1C is < 7)   No components found for: \"LABMICR\"  No components found for: \"LDLCHOLESTEROL\", \"LDLCALC\"    (goal LDL is <100)   AST (U/L)   Date Value   03/12/2025 9 (L)     ALT (U/L)   Date Value   03/12/2025 12     BUN (mg/dL)   Date Value   03/12/2025 14     BP Readings from Last 3 Encounters:   03/19/25 (!) 206/80   03/12/25 (!) 220/88   11/07/23 (!)

## 2025-07-21 NOTE — TELEPHONE ENCOUNTER
Maribeth Hall is calling to request a refill on the following medication(s):    Medication Request:  Requested Prescriptions     Pending Prescriptions Disp Refills    metFORMIN (GLUCOPHAGE) 1000 MG tablet [Pharmacy Med Name: metFORMIN HCl 1000 MG Oral Tablet] 180 tablet 3     Sig: TAKE 1 TABLET BY MOUTH TWICE  DAILY WITH MEALS       Last Visit Date (If Applicable):  Visit date not found    Next Visit Date:    Visit date not found

## (undated) DEVICE — YANKAUER,FLEXIBLE HANDLE,REGLR CAPACITY: Brand: MEDLINE INDUSTRIES, INC.

## (undated) DEVICE — GLOVE SURG SZ 7 L12IN FNGR THK87MIL WHT LTX FREE

## (undated) DEVICE — TUBING, SUCTION, 1/4" X 12', STRAIGHT: Brand: MEDLINE

## (undated) DEVICE — KIT NEG PRSS M W12.5XH3.2XL18CM 2 SHT STD DRP 1 SENSATRAC

## (undated) DEVICE — INTENDED FOR TISSUE SEPARATION, AND OTHER PROCEDURES THAT REQUIRE A SHARP SURGICAL BLADE TO PUNCTURE OR CUT.: Brand: BARD-PARKER ® CARBON RIB-BACK BLADES

## (undated) DEVICE — BLANKET WRM W29.9XL79.1IN UP BODY FORC AIR MISTRAL-AIR

## (undated) DEVICE — DRAPE,REIN 53X77,STERILE: Brand: MEDLINE

## (undated) DEVICE — SKIN PREP TRAY W/CHG: Brand: MEDLINE INDUSTRIES, INC.

## (undated) DEVICE — BANDAGE,GAUZE,BULKEE II,4.5"X4.1YD,STRL: Brand: MEDLINE

## (undated) DEVICE — SPONGE LAP W18XL18IN WHT COT 4 PLY FLD STRUNG RADPQ DISP ST

## (undated) DEVICE — GLOVE SURG SZ 75 L12IN FNGR THK87MIL WHT LTX FREE

## (undated) DEVICE — 3M™ STERI-STRIP™ COMPOUND BENZOIN TINCTURE 40 BAGS/CARTON 4 CARTONS/CASE C1544: Brand: 3M™ STERI-STRIP™

## (undated) DEVICE — Device

## (undated) DEVICE — SWAB CULT CLR BLU PLAS RAYON LIQ AMIES AERB ANAERB FRIC CAP

## (undated) DEVICE — 3M™ IOBAN™ 2 ANTIMICROBIAL INCISE DRAPE 6650EZ: Brand: IOBAN™ 2

## (undated) DEVICE — CANISTER NEG PRSS 1000ML W/ GEL INFOVAC

## (undated) DEVICE — TRAY PREP DRY W/ PREM GLV 2 APPL 6 SPNG 2 UNDPD 1 OVERWRAP

## (undated) DEVICE — GLOVE SURG SZ 75 L12IN FNGR THK79MIL GRN LTX FREE

## (undated) DEVICE — SURGICAL PROCEDURE KIT BASIN MAJ SET UP